# Patient Record
Sex: MALE | Race: ASIAN | NOT HISPANIC OR LATINO | URBAN - METROPOLITAN AREA
[De-identification: names, ages, dates, MRNs, and addresses within clinical notes are randomized per-mention and may not be internally consistent; named-entity substitution may affect disease eponyms.]

---

## 2017-06-16 ENCOUNTER — EMERGENCY (EMERGENCY)
Facility: HOSPITAL | Age: 37
LOS: 1 days | Discharge: HOME | End: 2017-06-16
Attending: EMERGENCY MEDICINE | Admitting: EMERGENCY MEDICINE
Payer: COMMERCIAL

## 2017-06-16 VITALS
RESPIRATION RATE: 18 BRPM | HEART RATE: 93 BPM | DIASTOLIC BLOOD PRESSURE: 106 MMHG | OXYGEN SATURATION: 100 % | SYSTOLIC BLOOD PRESSURE: 160 MMHG | TEMPERATURE: 99 F

## 2017-06-16 LAB
APPEARANCE UR: CLEAR — SIGNIFICANT CHANGE UP
BASOPHILS # BLD AUTO: 0.02 K/UL — SIGNIFICANT CHANGE UP (ref 0–0.2)
BASOPHILS NFR BLD AUTO: 0.2 % — SIGNIFICANT CHANGE UP (ref 0–2)
BILIRUB UR-MCNC: NEGATIVE — SIGNIFICANT CHANGE UP
BLOOD UR QL VISUAL: HIGH
COLOR SPEC: SIGNIFICANT CHANGE UP
EOSINOPHIL # BLD AUTO: 0.16 K/UL — SIGNIFICANT CHANGE UP (ref 0–0.5)
EOSINOPHIL NFR BLD AUTO: 1.9 % — SIGNIFICANT CHANGE UP (ref 0–6)
GLUCOSE UR-MCNC: >1000 — SIGNIFICANT CHANGE UP
HCT VFR BLD CALC: 47.9 % — SIGNIFICANT CHANGE UP (ref 39–50)
HGB BLD-MCNC: 15.5 G/DL — SIGNIFICANT CHANGE UP (ref 13–17)
IMM GRANULOCYTES NFR BLD AUTO: 0.9 % — SIGNIFICANT CHANGE UP (ref 0–1.5)
KETONES UR-MCNC: NEGATIVE — SIGNIFICANT CHANGE UP
LEUKOCYTE ESTERASE UR-ACNC: HIGH
LYMPHOCYTES # BLD AUTO: 2.79 K/UL — SIGNIFICANT CHANGE UP (ref 1–3.3)
LYMPHOCYTES # BLD AUTO: 32.3 % — SIGNIFICANT CHANGE UP (ref 13–44)
MCHC RBC-ENTMCNC: 31.9 PG — SIGNIFICANT CHANGE UP (ref 27–34)
MCHC RBC-ENTMCNC: 32.4 % — SIGNIFICANT CHANGE UP (ref 32–36)
MCV RBC AUTO: 98.6 FL — SIGNIFICANT CHANGE UP (ref 80–100)
MONOCYTES # BLD AUTO: 0.87 K/UL — SIGNIFICANT CHANGE UP (ref 0–0.9)
MONOCYTES NFR BLD AUTO: 10.1 % — SIGNIFICANT CHANGE UP (ref 2–14)
MUCOUS THREADS # UR AUTO: SIGNIFICANT CHANGE UP
NEUTROPHILS # BLD AUTO: 4.72 K/UL — SIGNIFICANT CHANGE UP (ref 1.8–7.4)
NEUTROPHILS NFR BLD AUTO: 54.6 % — SIGNIFICANT CHANGE UP (ref 43–77)
NITRITE UR-MCNC: NEGATIVE — SIGNIFICANT CHANGE UP
NON-SQ EPI CELLS # UR AUTO: <1 — SIGNIFICANT CHANGE UP
PH UR: 6.5 — SIGNIFICANT CHANGE UP (ref 4.6–8)
PLATELET # BLD AUTO: 211 K/UL — SIGNIFICANT CHANGE UP (ref 150–400)
PMV BLD: 9.4 FL — SIGNIFICANT CHANGE UP (ref 7–13)
PROT UR-MCNC: 10 — SIGNIFICANT CHANGE UP
RBC # BLD: 4.86 M/UL — SIGNIFICANT CHANGE UP (ref 4.2–5.8)
RBC # FLD: 12.1 % — SIGNIFICANT CHANGE UP (ref 10.3–14.5)
RBC CASTS # UR COMP ASSIST: >50 — HIGH (ref 0–?)
SP GR SPEC: 1.02 — SIGNIFICANT CHANGE UP (ref 1–1.03)
UROBILINOGEN FLD QL: NORMAL E.U. — SIGNIFICANT CHANGE UP (ref 0.1–0.2)
WBC # BLD: 8.64 K/UL — SIGNIFICANT CHANGE UP (ref 3.8–10.5)
WBC # FLD AUTO: 8.64 K/UL — SIGNIFICANT CHANGE UP (ref 3.8–10.5)
WBC CLUMPS #/AREA URNS HPF: PRESENT — HIGH (ref 0–?)
WBC UR QL: SIGNIFICANT CHANGE UP (ref 0–?)

## 2017-06-16 PROCEDURE — 74176 CT ABD & PELVIS W/O CONTRAST: CPT | Mod: 26

## 2017-06-16 PROCEDURE — 99285 EMERGENCY DEPT VISIT HI MDM: CPT

## 2017-06-16 RX ORDER — KETOROLAC TROMETHAMINE 30 MG/ML
15 SYRINGE (ML) INJECTION ONCE
Qty: 0 | Refills: 0 | Status: DISCONTINUED | OUTPATIENT
Start: 2017-06-16 | End: 2017-06-16

## 2017-06-16 RX ORDER — SODIUM CHLORIDE 9 MG/ML
1000 INJECTION INTRAMUSCULAR; INTRAVENOUS; SUBCUTANEOUS ONCE
Qty: 0 | Refills: 0 | Status: COMPLETED | OUTPATIENT
Start: 2017-06-16 | End: 2017-06-16

## 2017-06-16 RX ADMIN — Medication 15 MILLIGRAM(S): at 23:36

## 2017-06-16 RX ADMIN — SODIUM CHLORIDE 1000 MILLILITER(S): 9 INJECTION INTRAMUSCULAR; INTRAVENOUS; SUBCUTANEOUS at 23:36

## 2017-06-16 NOTE — ED ADULT TRIAGE NOTE - CHIEF COMPLAINT QUOTE
Pt. with c/o R flank pain for a few days; worst since yesterday. Pt. with hx of kidney stones.  Pt. was last diagnosed with kidney stones 1.5yrs ago.

## 2017-06-16 NOTE — ED PROVIDER NOTE - NS ED ROS FT
pt no acute distress. normal S1-S2 No resp distress. able to speak in full and clear sentences. no wheeze, rales or stridor. soft tender right side abdomen. no  rebound. no guarding. no sign of trauma. no CVAT   chaperone T. Swapna tender right testicle. no penile dc. no lesions. no inguinal hernia.

## 2017-06-16 NOTE — ED PROVIDER NOTE - PROGRESS NOTE DETAILS
pt informed of results of labs and Ct. Pain "ok" still has some pain, does not want pain med. pending testicular us. [facility] urology on call, Dr Harris, reviewed images no stent indicated now. if pt pain controlled pt may go home w po meds  (abx and pain med) . pt in US now. uncle states he "is ok, no pain". will endorse pt to Dr Larsen pending us and re eval for pain.

## 2017-06-16 NOTE — ED PROVIDER NOTE - OBJECTIVE STATEMENT
38yo M hx of kidney stones pw right flank pain for a day. Pain located on the right side radiate to the front and right testicle. no dysuria no freq or urgency no nausea/vomit. no fever or chills.  hx of kidney stone feels similar. no penile dc. not sexually active. no hx of STD. Dw w pt in private w chaperone.

## 2017-06-17 VITALS
OXYGEN SATURATION: 100 % | SYSTOLIC BLOOD PRESSURE: 139 MMHG | HEART RATE: 74 BPM | RESPIRATION RATE: 16 BRPM | TEMPERATURE: 99 F

## 2017-06-17 LAB
ALBUMIN SERPL ELPH-MCNC: 4.5 G/DL — SIGNIFICANT CHANGE UP (ref 3.3–5)
ALP SERPL-CCNC: 50 U/L — SIGNIFICANT CHANGE UP (ref 40–120)
ALT FLD-CCNC: 54 U/L — HIGH (ref 4–41)
AST SERPL-CCNC: 26 U/L — SIGNIFICANT CHANGE UP (ref 4–40)
BILIRUB SERPL-MCNC: 0.8 MG/DL — SIGNIFICANT CHANGE UP (ref 0.2–1.2)
BUN SERPL-MCNC: 14 MG/DL — SIGNIFICANT CHANGE UP (ref 7–23)
CALCIUM SERPL-MCNC: 9.6 MG/DL — SIGNIFICANT CHANGE UP (ref 8.4–10.5)
CHLORIDE SERPL-SCNC: 100 MMOL/L — SIGNIFICANT CHANGE UP (ref 98–107)
CO2 SERPL-SCNC: 26 MMOL/L — SIGNIFICANT CHANGE UP (ref 22–31)
CREAT SERPL-MCNC: 1.06 MG/DL — SIGNIFICANT CHANGE UP (ref 0.5–1.3)
GLUCOSE SERPL-MCNC: 274 MG/DL — HIGH (ref 70–99)
POTASSIUM SERPL-MCNC: 4.3 MMOL/L — SIGNIFICANT CHANGE UP (ref 3.5–5.3)
POTASSIUM SERPL-SCNC: 4.3 MMOL/L — SIGNIFICANT CHANGE UP (ref 3.5–5.3)
PROT SERPL-MCNC: 7.2 G/DL — SIGNIFICANT CHANGE UP (ref 6–8.3)
SODIUM SERPL-SCNC: 139 MMOL/L — SIGNIFICANT CHANGE UP (ref 135–145)

## 2017-06-17 PROCEDURE — 76870 US EXAM SCROTUM: CPT | Mod: 26

## 2017-06-17 RX ORDER — CIPROFLOXACIN LACTATE 400MG/40ML
500 VIAL (ML) INTRAVENOUS ONCE
Qty: 0 | Refills: 0 | Status: COMPLETED | OUTPATIENT
Start: 2017-06-17 | End: 2017-06-17

## 2017-06-17 RX ORDER — IBUPROFEN 200 MG
1 TABLET ORAL
Qty: 28 | Refills: 0 | OUTPATIENT
Start: 2017-06-17 | End: 2017-06-24

## 2017-06-17 RX ADMIN — Medication 500 MILLIGRAM(S): at 01:08

## 2017-06-18 LAB
BACTERIA UR CULT: SIGNIFICANT CHANGE UP
SPECIMEN SOURCE: SIGNIFICANT CHANGE UP

## 2017-06-19 PROBLEM — Z00.00 ENCOUNTER FOR PREVENTIVE HEALTH EXAMINATION: Status: ACTIVE | Noted: 2017-06-19

## 2017-06-19 LAB — SPECIMEN SOURCE: SIGNIFICANT CHANGE UP

## 2017-06-20 LAB
C TRACH RRNA SPEC QL NAA+PROBE: SIGNIFICANT CHANGE UP
N GONORRHOEA RRNA SPEC QL NAA+PROBE: SIGNIFICANT CHANGE UP

## 2017-06-21 ENCOUNTER — APPOINTMENT (OUTPATIENT)
Dept: UROLOGY | Facility: CLINIC | Age: 37
End: 2017-06-21

## 2017-06-21 VITALS
SYSTOLIC BLOOD PRESSURE: 142 MMHG | DIASTOLIC BLOOD PRESSURE: 99 MMHG | WEIGHT: 185 LBS | HEART RATE: 81 BPM | TEMPERATURE: 97.4 F | BODY MASS INDEX: 26.48 KG/M2 | HEIGHT: 70 IN | RESPIRATION RATE: 18 BRPM

## 2017-06-21 DIAGNOSIS — N20.0 CALCULUS OF KIDNEY: ICD-10-CM

## 2017-06-22 LAB
APPEARANCE: CLEAR
BACTERIA: NEGATIVE
BILIRUBIN URINE: NEGATIVE
BLOOD URINE: ABNORMAL
COLOR: YELLOW
GLUCOSE QUALITATIVE U: 1000 MG/DL
HYALINE CASTS: 0 /LPF
KETONES URINE: NEGATIVE
LEUKOCYTE ESTERASE URINE: NEGATIVE
MICROSCOPIC-UA: NORMAL
NITRITE URINE: NEGATIVE
PH URINE: 6
PROTEIN URINE: ABNORMAL MG/DL
RED BLOOD CELLS URINE: 42 /HPF
SPECIFIC GRAVITY URINE: 1.04
SQUAMOUS EPITHELIAL CELLS: 0 /HPF
UROBILINOGEN URINE: NORMAL MG/DL
WHITE BLOOD CELLS URINE: 5 /HPF

## 2017-06-23 LAB — BACTERIA UR CULT: NORMAL

## 2017-07-17 ENCOUNTER — MESSAGE (OUTPATIENT)
Age: 37
End: 2017-07-17

## 2017-07-17 ENCOUNTER — OUTPATIENT (OUTPATIENT)
Dept: OUTPATIENT SERVICES | Facility: HOSPITAL | Age: 37
LOS: 1 days | End: 2017-07-17
Payer: COMMERCIAL

## 2017-07-17 VITALS
WEIGHT: 171.96 LBS | DIASTOLIC BLOOD PRESSURE: 84 MMHG | HEART RATE: 70 BPM | HEIGHT: 70 IN | RESPIRATION RATE: 16 BRPM | TEMPERATURE: 98 F | OXYGEN SATURATION: 99 % | SYSTOLIC BLOOD PRESSURE: 118 MMHG

## 2017-07-17 DIAGNOSIS — N20.0 CALCULUS OF KIDNEY: ICD-10-CM

## 2017-07-17 DIAGNOSIS — Z01.818 ENCOUNTER FOR OTHER PREPROCEDURAL EXAMINATION: ICD-10-CM

## 2017-07-17 DIAGNOSIS — E11.9 TYPE 2 DIABETES MELLITUS WITHOUT COMPLICATIONS: ICD-10-CM

## 2017-07-17 LAB
ANION GAP SERPL CALC-SCNC: 15 MMOL/L — SIGNIFICANT CHANGE UP (ref 5–17)
BUN SERPL-MCNC: 16 MG/DL — SIGNIFICANT CHANGE UP (ref 7–23)
CALCIUM SERPL-MCNC: 9.7 MG/DL — SIGNIFICANT CHANGE UP (ref 8.4–10.5)
CHLORIDE SERPL-SCNC: 103 MMOL/L — SIGNIFICANT CHANGE UP (ref 96–108)
CO2 SERPL-SCNC: 23 MMOL/L — SIGNIFICANT CHANGE UP (ref 22–31)
CREAT SERPL-MCNC: 1.08 MG/DL — SIGNIFICANT CHANGE UP (ref 0.5–1.3)
GLUCOSE SERPL-MCNC: 185 MG/DL — HIGH (ref 70–99)
HBA1C BLD-MCNC: 9.9 % — HIGH (ref 4–5.6)
HCT VFR BLD CALC: 49.3 % — SIGNIFICANT CHANGE UP (ref 39–50)
HGB BLD-MCNC: 16.4 G/DL — SIGNIFICANT CHANGE UP (ref 13–17)
MCHC RBC-ENTMCNC: 32 PG — SIGNIFICANT CHANGE UP (ref 27–34)
MCHC RBC-ENTMCNC: 33.3 GM/DL — SIGNIFICANT CHANGE UP (ref 32–36)
MCV RBC AUTO: 96.1 FL — SIGNIFICANT CHANGE UP (ref 80–100)
PLATELET # BLD AUTO: 222 K/UL — SIGNIFICANT CHANGE UP (ref 150–400)
POTASSIUM SERPL-MCNC: 4.7 MMOL/L — SIGNIFICANT CHANGE UP (ref 3.5–5.3)
POTASSIUM SERPL-SCNC: 4.7 MMOL/L — SIGNIFICANT CHANGE UP (ref 3.5–5.3)
RBC # BLD: 5.13 M/UL — SIGNIFICANT CHANGE UP (ref 4.2–5.8)
RBC # FLD: 12 % — SIGNIFICANT CHANGE UP (ref 10.3–14.5)
SODIUM SERPL-SCNC: 141 MMOL/L — SIGNIFICANT CHANGE UP (ref 135–145)
WBC # BLD: 8.97 K/UL — SIGNIFICANT CHANGE UP (ref 3.8–10.5)
WBC # FLD AUTO: 8.97 K/UL — SIGNIFICANT CHANGE UP (ref 3.8–10.5)

## 2017-07-17 RX ORDER — CEFAZOLIN SODIUM 1 G
2000 VIAL (EA) INJECTION ONCE
Qty: 0 | Refills: 0 | Status: DISCONTINUED | OUTPATIENT
Start: 2017-07-27 | End: 2017-07-29

## 2017-07-17 NOTE — H&P PST ADULT - NSANTHOSAYNRD_GEN_A_CORE
No. DUANE screening performed.  STOP BANG Legend: 0-2 = LOW Risk; 3-4 = INTERMEDIATE Risk; 5-8 = HIGH Risk

## 2017-07-17 NOTE — H&P PST ADULT - HISTORY OF PRESENT ILLNESS
36 y/o Male C/O right flank & right groin pain. Pt reports he went to ER 6/2017. S/P Cat scan revealed kidney stone. Presents right percutaneous nephrostolithotomy.

## 2017-07-17 NOTE — H&P PST ADULT - PMH
Controlled type 2 diabetes mellitus without complication, unspecified long term insulin use status  Pt was just diagnosed 1 month ago with DM 2  Kidney stones  right  Varicocele present on ultrasound of scrotum Controlled type 2 diabetes mellitus without complication, unspecified long term insulin use status  Pt reports newly diagnosed 1 month ago with DM 2, started on insulin and states FS are less than 100 in AM  Kidney stones  right  Varicocele present on ultrasound of scrotum

## 2017-07-18 LAB
CULTURE RESULTS: SIGNIFICANT CHANGE UP
SPECIMEN SOURCE: SIGNIFICANT CHANGE UP

## 2017-07-27 ENCOUNTER — MESSAGE (OUTPATIENT)
Age: 37
End: 2017-07-27

## 2017-07-27 ENCOUNTER — TRANSCRIPTION ENCOUNTER (OUTPATIENT)
Age: 37
End: 2017-07-27

## 2017-07-27 ENCOUNTER — INPATIENT (INPATIENT)
Facility: HOSPITAL | Age: 37
LOS: 2 days | Discharge: HOME | DRG: 661 | End: 2017-07-30
Attending: UROLOGY | Admitting: UROLOGY
Payer: COMMERCIAL

## 2017-07-27 ENCOUNTER — APPOINTMENT (OUTPATIENT)
Dept: UROLOGY | Facility: HOSPITAL | Age: 37
End: 2017-07-27

## 2017-07-27 ENCOUNTER — RESULT REVIEW (OUTPATIENT)
Age: 37
End: 2017-07-27

## 2017-07-27 VITALS
OXYGEN SATURATION: 99 % | SYSTOLIC BLOOD PRESSURE: 130 MMHG | RESPIRATION RATE: 16 BRPM | TEMPERATURE: 98 F | HEART RATE: 78 BPM | DIASTOLIC BLOOD PRESSURE: 84 MMHG

## 2017-07-27 DIAGNOSIS — N20.0 CALCULUS OF KIDNEY: ICD-10-CM

## 2017-07-27 LAB
ANION GAP SERPL CALC-SCNC: 14 MMOL/L — SIGNIFICANT CHANGE UP (ref 5–17)
BASOPHILS # BLD AUTO: 0 K/UL — SIGNIFICANT CHANGE UP (ref 0–0.2)
BASOPHILS NFR BLD AUTO: 0.1 % — SIGNIFICANT CHANGE UP (ref 0–2)
BLD GP AB SCN SERPL QL: NEGATIVE — SIGNIFICANT CHANGE UP
BUN SERPL-MCNC: 12 MG/DL — SIGNIFICANT CHANGE UP (ref 7–23)
CALCIUM SERPL-MCNC: 8.6 MG/DL — SIGNIFICANT CHANGE UP (ref 8.4–10.5)
CHLORIDE SERPL-SCNC: 101 MMOL/L — SIGNIFICANT CHANGE UP (ref 96–108)
CO2 SERPL-SCNC: 24 MMOL/L — SIGNIFICANT CHANGE UP (ref 22–31)
CREAT SERPL-MCNC: 1.1 MG/DL — SIGNIFICANT CHANGE UP (ref 0.5–1.3)
EOSINOPHIL # BLD AUTO: 0 K/UL — SIGNIFICANT CHANGE UP (ref 0–0.5)
EOSINOPHIL NFR BLD AUTO: 0.2 % — SIGNIFICANT CHANGE UP (ref 0–6)
GLUCOSE SERPL-MCNC: 136 MG/DL — HIGH (ref 70–99)
HCT VFR BLD CALC: 46.7 % — SIGNIFICANT CHANGE UP (ref 39–50)
HGB BLD-MCNC: 15.8 G/DL — SIGNIFICANT CHANGE UP (ref 13–17)
LYMPHOCYTES # BLD AUTO: 1.7 K/UL — SIGNIFICANT CHANGE UP (ref 1–3.3)
LYMPHOCYTES # BLD AUTO: 10.1 % — LOW (ref 13–44)
MCHC RBC-ENTMCNC: 33.4 PG — SIGNIFICANT CHANGE UP (ref 27–34)
MCHC RBC-ENTMCNC: 33.8 GM/DL — SIGNIFICANT CHANGE UP (ref 32–36)
MCV RBC AUTO: 99 FL — SIGNIFICANT CHANGE UP (ref 80–100)
MONOCYTES # BLD AUTO: 0.2 K/UL — SIGNIFICANT CHANGE UP (ref 0–0.9)
MONOCYTES NFR BLD AUTO: 1.4 % — LOW (ref 2–14)
NEUTROPHILS # BLD AUTO: 14.8 K/UL — HIGH (ref 1.8–7.4)
NEUTROPHILS NFR BLD AUTO: 88.2 % — HIGH (ref 43–77)
PLATELET # BLD AUTO: 195 K/UL — SIGNIFICANT CHANGE UP (ref 150–400)
POTASSIUM SERPL-MCNC: 4.2 MMOL/L — SIGNIFICANT CHANGE UP (ref 3.5–5.3)
POTASSIUM SERPL-SCNC: 4.2 MMOL/L — SIGNIFICANT CHANGE UP (ref 3.5–5.3)
RBC # BLD: 4.71 M/UL — SIGNIFICANT CHANGE UP (ref 4.2–5.8)
RBC # FLD: 11.1 % — SIGNIFICANT CHANGE UP (ref 10.3–14.5)
RH IG SCN BLD-IMP: POSITIVE — SIGNIFICANT CHANGE UP
RH IG SCN BLD-IMP: POSITIVE — SIGNIFICANT CHANGE UP
SODIUM SERPL-SCNC: 139 MMOL/L — SIGNIFICANT CHANGE UP (ref 135–145)
WBC # BLD: 16.8 K/UL — HIGH (ref 3.8–10.5)
WBC # FLD AUTO: 16.8 K/UL — HIGH (ref 3.8–10.5)

## 2017-07-27 PROCEDURE — 76000 FLUOROSCOPY <1 HR PHYS/QHP: CPT

## 2017-07-27 PROCEDURE — 87086 URINE CULTURE/COLONY COUNT: CPT

## 2017-07-27 PROCEDURE — 80048 BASIC METABOLIC PNL TOTAL CA: CPT

## 2017-07-27 PROCEDURE — 83036 HEMOGLOBIN GLYCOSYLATED A1C: CPT

## 2017-07-27 PROCEDURE — G0463: CPT

## 2017-07-27 PROCEDURE — 88300 SURGICAL PATH GROSS: CPT | Mod: 26

## 2017-07-27 PROCEDURE — 85027 COMPLETE CBC AUTOMATED: CPT

## 2017-07-27 RX ORDER — HYDROMORPHONE HYDROCHLORIDE 2 MG/ML
1 INJECTION INTRAMUSCULAR; INTRAVENOUS; SUBCUTANEOUS EVERY 4 HOURS
Qty: 0 | Refills: 0 | Status: DISCONTINUED | OUTPATIENT
Start: 2017-07-27 | End: 2017-07-30

## 2017-07-27 RX ORDER — ONDANSETRON 8 MG/1
4 TABLET, FILM COATED ORAL EVERY 6 HOURS
Qty: 0 | Refills: 0 | Status: DISCONTINUED | OUTPATIENT
Start: 2017-07-27 | End: 2017-07-30

## 2017-07-27 RX ORDER — DEXTROSE 50 % IN WATER 50 %
12.5 SYRINGE (ML) INTRAVENOUS ONCE
Qty: 0 | Refills: 0 | Status: DISCONTINUED | OUTPATIENT
Start: 2017-07-27 | End: 2017-07-30

## 2017-07-27 RX ORDER — HYDROMORPHONE HYDROCHLORIDE 2 MG/ML
0.5 INJECTION INTRAMUSCULAR; INTRAVENOUS; SUBCUTANEOUS EVERY 4 HOURS
Qty: 0 | Refills: 0 | Status: DISCONTINUED | OUTPATIENT
Start: 2017-07-27 | End: 2017-07-30

## 2017-07-27 RX ORDER — DOCUSATE SODIUM 100 MG
100 CAPSULE ORAL
Qty: 0 | Refills: 0 | Status: DISCONTINUED | OUTPATIENT
Start: 2017-07-27 | End: 2017-07-30

## 2017-07-27 RX ORDER — SODIUM CHLORIDE 9 MG/ML
3 INJECTION INTRAMUSCULAR; INTRAVENOUS; SUBCUTANEOUS EVERY 8 HOURS
Qty: 0 | Refills: 0 | Status: DISCONTINUED | OUTPATIENT
Start: 2017-07-27 | End: 2017-07-27

## 2017-07-27 RX ORDER — DEXTROSE 50 % IN WATER 50 %
25 SYRINGE (ML) INTRAVENOUS ONCE
Qty: 0 | Refills: 0 | Status: DISCONTINUED | OUTPATIENT
Start: 2017-07-27 | End: 2017-07-30

## 2017-07-27 RX ORDER — GLUCAGON INJECTION, SOLUTION 0.5 MG/.1ML
1 INJECTION, SOLUTION SUBCUTANEOUS ONCE
Qty: 0 | Refills: 0 | Status: DISCONTINUED | OUTPATIENT
Start: 2017-07-27 | End: 2017-07-30

## 2017-07-27 RX ORDER — HEPARIN SODIUM 5000 [USP'U]/ML
5000 INJECTION INTRAVENOUS; SUBCUTANEOUS EVERY 12 HOURS
Qty: 0 | Refills: 0 | Status: DISCONTINUED | OUTPATIENT
Start: 2017-07-27 | End: 2017-07-30

## 2017-07-27 RX ORDER — SENNA PLUS 8.6 MG/1
2 TABLET ORAL AT BEDTIME
Qty: 0 | Refills: 0 | Status: DISCONTINUED | OUTPATIENT
Start: 2017-07-27 | End: 2017-07-30

## 2017-07-27 RX ORDER — INSULIN LISPRO 100/ML
VIAL (ML) SUBCUTANEOUS AT BEDTIME
Qty: 0 | Refills: 0 | Status: DISCONTINUED | OUTPATIENT
Start: 2017-07-27 | End: 2017-07-30

## 2017-07-27 RX ORDER — ONDANSETRON 8 MG/1
4 TABLET, FILM COATED ORAL ONCE
Qty: 0 | Refills: 0 | Status: DISCONTINUED | OUTPATIENT
Start: 2017-07-27 | End: 2017-07-28

## 2017-07-27 RX ORDER — SODIUM CHLORIDE 9 MG/ML
1000 INJECTION, SOLUTION INTRAVENOUS
Qty: 0 | Refills: 0 | Status: DISCONTINUED | OUTPATIENT
Start: 2017-07-27 | End: 2017-07-30

## 2017-07-27 RX ORDER — PHENAZOPYRIDINE HCL 100 MG
200 TABLET ORAL EVERY 8 HOURS
Qty: 0 | Refills: 0 | Status: COMPLETED | OUTPATIENT
Start: 2017-07-27 | End: 2017-07-28

## 2017-07-27 RX ORDER — DEXTROSE 50 % IN WATER 50 %
1 SYRINGE (ML) INTRAVENOUS ONCE
Qty: 0 | Refills: 0 | Status: DISCONTINUED | OUTPATIENT
Start: 2017-07-27 | End: 2017-07-30

## 2017-07-27 RX ORDER — CEFAZOLIN SODIUM 1 G
1000 VIAL (EA) INJECTION EVERY 8 HOURS
Qty: 0 | Refills: 0 | Status: DISCONTINUED | OUTPATIENT
Start: 2017-07-27 | End: 2017-07-29

## 2017-07-27 RX ORDER — INSULIN LISPRO 100/ML
VIAL (ML) SUBCUTANEOUS
Qty: 0 | Refills: 0 | Status: DISCONTINUED | OUTPATIENT
Start: 2017-07-27 | End: 2017-07-30

## 2017-07-27 RX ORDER — OXYCODONE AND ACETAMINOPHEN 5; 325 MG/1; MG/1
1 TABLET ORAL EVERY 4 HOURS
Qty: 0 | Refills: 0 | Status: DISCONTINUED | OUTPATIENT
Start: 2017-07-27 | End: 2017-07-30

## 2017-07-27 RX ORDER — HYDROMORPHONE HYDROCHLORIDE 2 MG/ML
0.5 INJECTION INTRAMUSCULAR; INTRAVENOUS; SUBCUTANEOUS
Qty: 0 | Refills: 0 | Status: DISCONTINUED | OUTPATIENT
Start: 2017-07-27 | End: 2017-07-28

## 2017-07-27 RX ORDER — SODIUM CHLORIDE 9 MG/ML
1000 INJECTION INTRAMUSCULAR; INTRAVENOUS; SUBCUTANEOUS
Qty: 0 | Refills: 0 | Status: DISCONTINUED | OUTPATIENT
Start: 2017-07-27 | End: 2017-07-30

## 2017-07-27 RX ORDER — OXYCODONE AND ACETAMINOPHEN 5; 325 MG/1; MG/1
2 TABLET ORAL EVERY 4 HOURS
Qty: 0 | Refills: 0 | Status: DISCONTINUED | OUTPATIENT
Start: 2017-07-27 | End: 2017-07-30

## 2017-07-27 RX ORDER — HYDROMORPHONE HYDROCHLORIDE 2 MG/ML
1 INJECTION INTRAMUSCULAR; INTRAVENOUS; SUBCUTANEOUS
Qty: 0 | Refills: 0 | Status: DISCONTINUED | OUTPATIENT
Start: 2017-07-27 | End: 2017-07-27

## 2017-07-27 RX ADMIN — Medication 100 MILLIGRAM(S): at 22:16

## 2017-07-27 RX ADMIN — HYDROMORPHONE HYDROCHLORIDE 1 MILLIGRAM(S): 2 INJECTION INTRAMUSCULAR; INTRAVENOUS; SUBCUTANEOUS at 19:00

## 2017-07-27 RX ADMIN — HYDROMORPHONE HYDROCHLORIDE 1 MILLIGRAM(S): 2 INJECTION INTRAMUSCULAR; INTRAVENOUS; SUBCUTANEOUS at 18:40

## 2017-07-27 RX ADMIN — HYDROMORPHONE HYDROCHLORIDE 1 MILLIGRAM(S): 2 INJECTION INTRAMUSCULAR; INTRAVENOUS; SUBCUTANEOUS at 19:32

## 2017-07-27 RX ADMIN — HYDROMORPHONE HYDROCHLORIDE 0.5 MILLIGRAM(S): 2 INJECTION INTRAMUSCULAR; INTRAVENOUS; SUBCUTANEOUS at 22:28

## 2017-07-27 RX ADMIN — HYDROMORPHONE HYDROCHLORIDE 1 MILLIGRAM(S): 2 INJECTION INTRAMUSCULAR; INTRAVENOUS; SUBCUTANEOUS at 19:15

## 2017-07-27 RX ADMIN — SODIUM CHLORIDE 125 MILLILITER(S): 9 INJECTION INTRAMUSCULAR; INTRAVENOUS; SUBCUTANEOUS at 19:02

## 2017-07-27 RX ADMIN — HYDROMORPHONE HYDROCHLORIDE 0.5 MILLIGRAM(S): 2 INJECTION INTRAMUSCULAR; INTRAVENOUS; SUBCUTANEOUS at 22:39

## 2017-07-27 RX ADMIN — HEPARIN SODIUM 5000 UNIT(S): 5000 INJECTION INTRAVENOUS; SUBCUTANEOUS at 23:37

## 2017-07-27 NOTE — PROGRESS NOTE ADULT - SUBJECTIVE AND OBJECTIVE BOX
Post op Check    Pt seen and examined without complaints. Pain is controlled. Denies SOB/CP/N/V.   Tolerating po    Vital Signs Last 24 Hrs  T(C): 36.5 (27 Jul 2017 20:00), Max: 36.9 (27 Jul 2017 11:39)  T(F): 97.7 (27 Jul 2017 20:00), Max: 98.4 (27 Jul 2017 11:39)  HR: 76 (27 Jul 2017 21:00) (68 - 99)  BP: 110/68 (27 Jul 2017 21:00) (110/68 - 148/90)  BP(mean): 84 (27 Jul 2017 21:00) (82 - 112)  RR: 15 (27 Jul 2017 21:00) (14 - 16)  SpO2: 95% (27 Jul 2017 21:00) (95% - 100%)    I&O's Summary    27 Jul 2017 07:01  -  27 Jul 2017 22:33  --------------------------------------------------------  IN: 500 mL / OUT: 550 mL / NET: -50 mL        Physical Exam  Gen: NAD  Abd: Soft, NT, ND  Back: R PCN in place, no drainage  : 16F south in place, urine hematuric                          15.8   16.8  )-----------( 195      ( 27 Jul 2017 18:57 )             46.7       07-27    139  |  101  |  12  ----------------------------<  136<H>  4.2   |  24  |  1.10    Ca    8.6      27 Jul 2017 18:57        A/P: 37y Male s/p Right Percutaneous Nephrolithotomy    -DVT prophylaxis/OOB  -Incentive spirometry  -Strict I&O's  -Analgesia and antiemetics as needed  -Diet  -AM labs  -CT tomorrow  -likely TOV

## 2017-07-28 ENCOUNTER — TRANSCRIPTION ENCOUNTER (OUTPATIENT)
Age: 37
End: 2017-07-28

## 2017-07-28 LAB
ANION GAP SERPL CALC-SCNC: 14 MMOL/L — SIGNIFICANT CHANGE UP (ref 5–17)
BASOPHILS # BLD AUTO: 0 K/UL — SIGNIFICANT CHANGE UP (ref 0–0.2)
BASOPHILS NFR BLD AUTO: 0.2 % — SIGNIFICANT CHANGE UP (ref 0–2)
BUN SERPL-MCNC: 12 MG/DL — SIGNIFICANT CHANGE UP (ref 7–23)
CALCIUM SERPL-MCNC: 9 MG/DL — SIGNIFICANT CHANGE UP (ref 8.4–10.5)
CHLORIDE SERPL-SCNC: 99 MMOL/L — SIGNIFICANT CHANGE UP (ref 96–108)
CO2 SERPL-SCNC: 26 MMOL/L — SIGNIFICANT CHANGE UP (ref 22–31)
CREAT SERPL-MCNC: 1.03 MG/DL — SIGNIFICANT CHANGE UP (ref 0.5–1.3)
EOSINOPHIL # BLD AUTO: 0 K/UL — SIGNIFICANT CHANGE UP (ref 0–0.5)
EOSINOPHIL NFR BLD AUTO: 0.1 % — SIGNIFICANT CHANGE UP (ref 0–6)
GLUCOSE SERPL-MCNC: 172 MG/DL — HIGH (ref 70–99)
HCT VFR BLD CALC: 49.1 % — SIGNIFICANT CHANGE UP (ref 39–50)
HGB BLD-MCNC: 15.1 G/DL — SIGNIFICANT CHANGE UP (ref 13–17)
LYMPHOCYTES # BLD AUTO: 1.4 K/UL — SIGNIFICANT CHANGE UP (ref 1–3.3)
LYMPHOCYTES # BLD AUTO: 11.7 % — LOW (ref 13–44)
MCHC RBC-ENTMCNC: 30.8 GM/DL — LOW (ref 32–36)
MCHC RBC-ENTMCNC: 30.9 PG — SIGNIFICANT CHANGE UP (ref 27–34)
MCV RBC AUTO: 100 FL — SIGNIFICANT CHANGE UP (ref 80–100)
MONOCYTES # BLD AUTO: 0.7 K/UL — SIGNIFICANT CHANGE UP (ref 0–0.9)
MONOCYTES NFR BLD AUTO: 6 % — SIGNIFICANT CHANGE UP (ref 2–14)
NEUTROPHILS # BLD AUTO: 9.9 K/UL — HIGH (ref 1.8–7.4)
NEUTROPHILS NFR BLD AUTO: 82 % — HIGH (ref 43–77)
PLATELET # BLD AUTO: 204 K/UL — SIGNIFICANT CHANGE UP (ref 150–400)
POTASSIUM SERPL-MCNC: 4.3 MMOL/L — SIGNIFICANT CHANGE UP (ref 3.5–5.3)
POTASSIUM SERPL-SCNC: 4.3 MMOL/L — SIGNIFICANT CHANGE UP (ref 3.5–5.3)
RBC # BLD: 4.9 M/UL — SIGNIFICANT CHANGE UP (ref 4.2–5.8)
RBC # FLD: 11 % — SIGNIFICANT CHANGE UP (ref 10.3–14.5)
SODIUM SERPL-SCNC: 139 MMOL/L — SIGNIFICANT CHANGE UP (ref 135–145)
WBC # BLD: 12.1 K/UL — HIGH (ref 3.8–10.5)
WBC # FLD AUTO: 12.1 K/UL — HIGH (ref 3.8–10.5)

## 2017-07-28 PROCEDURE — 74176 CT ABD & PELVIS W/O CONTRAST: CPT | Mod: 26

## 2017-07-28 RX ORDER — KETOROLAC TROMETHAMINE 30 MG/ML
30 SYRINGE (ML) INJECTION EVERY 8 HOURS
Qty: 0 | Refills: 0 | Status: DISCONTINUED | OUTPATIENT
Start: 2017-07-28 | End: 2017-07-29

## 2017-07-28 RX ADMIN — Medication 100 MILLIGRAM(S): at 04:58

## 2017-07-28 RX ADMIN — HYDROMORPHONE HYDROCHLORIDE 1 MILLIGRAM(S): 2 INJECTION INTRAMUSCULAR; INTRAVENOUS; SUBCUTANEOUS at 00:35

## 2017-07-28 RX ADMIN — HYDROMORPHONE HYDROCHLORIDE 1 MILLIGRAM(S): 2 INJECTION INTRAMUSCULAR; INTRAVENOUS; SUBCUTANEOUS at 00:21

## 2017-07-28 RX ADMIN — Medication 30 MILLIGRAM(S): at 17:42

## 2017-07-28 RX ADMIN — OXYCODONE AND ACETAMINOPHEN 2 TABLET(S): 5; 325 TABLET ORAL at 18:38

## 2017-07-28 RX ADMIN — OXYCODONE AND ACETAMINOPHEN 2 TABLET(S): 5; 325 TABLET ORAL at 10:24

## 2017-07-28 RX ADMIN — Medication 200 MILLIGRAM(S): at 04:59

## 2017-07-28 RX ADMIN — SODIUM CHLORIDE 125 MILLILITER(S): 9 INJECTION INTRAMUSCULAR; INTRAVENOUS; SUBCUTANEOUS at 13:59

## 2017-07-28 RX ADMIN — HYDROMORPHONE HYDROCHLORIDE 1 MILLIGRAM(S): 2 INJECTION INTRAMUSCULAR; INTRAVENOUS; SUBCUTANEOUS at 04:58

## 2017-07-28 RX ADMIN — Medication 100 MILLIGRAM(S): at 23:08

## 2017-07-28 RX ADMIN — Medication 30 MILLIGRAM(S): at 17:27

## 2017-07-28 RX ADMIN — Medication 1: at 11:51

## 2017-07-28 RX ADMIN — HEPARIN SODIUM 5000 UNIT(S): 5000 INJECTION INTRAVENOUS; SUBCUTANEOUS at 23:08

## 2017-07-28 RX ADMIN — Medication 30 MILLIGRAM(S): at 10:07

## 2017-07-28 RX ADMIN — Medication 200 MILLIGRAM(S): at 14:00

## 2017-07-28 RX ADMIN — HYDROMORPHONE HYDROCHLORIDE 0.5 MILLIGRAM(S): 2 INJECTION INTRAMUSCULAR; INTRAVENOUS; SUBCUTANEOUS at 08:14

## 2017-07-28 RX ADMIN — HEPARIN SODIUM 5000 UNIT(S): 5000 INJECTION INTRAVENOUS; SUBCUTANEOUS at 11:51

## 2017-07-28 RX ADMIN — OXYCODONE AND ACETAMINOPHEN 2 TABLET(S): 5; 325 TABLET ORAL at 09:54

## 2017-07-28 RX ADMIN — Medication 100 MILLIGRAM(S): at 17:27

## 2017-07-28 RX ADMIN — OXYCODONE AND ACETAMINOPHEN 2 TABLET(S): 5; 325 TABLET ORAL at 14:32

## 2017-07-28 RX ADMIN — OXYCODONE AND ACETAMINOPHEN 2 TABLET(S): 5; 325 TABLET ORAL at 19:08

## 2017-07-28 RX ADMIN — Medication 100 MILLIGRAM(S): at 13:59

## 2017-07-28 RX ADMIN — HYDROMORPHONE HYDROCHLORIDE 0.5 MILLIGRAM(S): 2 INJECTION INTRAMUSCULAR; INTRAVENOUS; SUBCUTANEOUS at 07:59

## 2017-07-28 RX ADMIN — Medication 30 MILLIGRAM(S): at 09:52

## 2017-07-28 RX ADMIN — HYDROMORPHONE HYDROCHLORIDE 1 MILLIGRAM(S): 2 INJECTION INTRAMUSCULAR; INTRAVENOUS; SUBCUTANEOUS at 05:12

## 2017-07-28 RX ADMIN — OXYCODONE AND ACETAMINOPHEN 2 TABLET(S): 5; 325 TABLET ORAL at 14:02

## 2017-07-28 NOTE — DISCHARGE NOTE ADULT - PATIENT PORTAL LINK FT
“You can access the [employer] Patient Portal, offered by [facility], by registering with the following website: [email or web address]”

## 2017-07-28 NOTE — DISCHARGE NOTE ADULT - CARE PLAN
Principal Discharge DX:	Kidney stones  Goal:	you had a right percutaneous nephrolithotomy  Instructions for follow-up, activity and diet:	Call the office if you experience fever, chills, uncontrolled pain, the inability to tolerate liquids, or the urine does not flow   to promote wound healing do not take a bath, continue to walk frequently, return to daily living activities slowly, no heavy lifting greater than 10lbs for 4-6 weeks, follow up Dr Scales in one to two weeks  Secondary Diagnosis:	Controlled type 2 diabetes mellitus without complication, unspecified long term insulin use status  Goal:	maintain adequate blood sugars  Instructions for follow-up, activity and diet:	continue home medications

## 2017-07-28 NOTE — DISCHARGE NOTE ADULT - HOSPITAL COURSE
36 yo m with a pmh of nephrolithiasis, dm2 who arrived at [facility] on 7/27/17 for a right perc nephrolithotomy. post op he did well. his vitals were stable, he tolerated diet, his pain was controlled, he ambulated  and his labs were stable. his ct showed no stone burden and his nt was removed on pod 2. he underwent a successful tov.

## 2017-07-28 NOTE — PROGRESS NOTE ADULT - ASSESSMENT
37M POD 1 s/p R PCNL, doing well.  --CBC, BMP  --TOV  --CT non con to assess residual stone  -- Out of bed, pain control, incentive spirometry, DVT prophylaxis   -- continue antibiotics until nephrostomy tube removed.

## 2017-07-28 NOTE — DISCHARGE NOTE ADULT - INSTRUCTIONS
regular diet call pmd if you have fever greater than 100 degrees, if you have pain unrelieved by medication ,  if your incision site becomes red, swollen, warm to touch,  if you see any oozing from site, or if you see fresh red blood.  call if you have difficulty urinating or if you have nausea or vomiting

## 2017-07-28 NOTE — DISCHARGE NOTE ADULT - PLAN OF CARE
you had a right percutaneous nephrolithotomy Call the office if you experience fever, chills, uncontrolled pain, the inability to tolerate liquids, or the urine does not flow   to promote wound healing do not take a bath, continue to walk frequently, return to daily living activities slowly, no heavy lifting greater than 10lbs for 4-6 weeks, follow up Dr Scales in one to two weeks maintain adequate blood sugars continue home medications

## 2017-07-28 NOTE — DISCHARGE NOTE ADULT - CARE PROVIDER_API CALL
Steffi Scales), Urology  [address] [place] [id number]  Phone: [phone]  Fax: [phone]

## 2017-07-28 NOTE — PROGRESS NOTE ADULT - SUBJECTIVE AND OBJECTIVE BOX
Subjective    Patient seen and examined. No overnight events. Complains of flank pain, helped with pain medicine, tolerating diet, no fevers, no n/v.     Objective    Vital signs  T(F): , Max: 98.4 (07-27-17 @ 11:39)  HR: 64 (07-28-17 @ 04:54)  BP: 111/72 (07-28-17 @ 04:54)  SpO2: 100% (07-28-17 @ 04:54)  Wt(kg): --    Output     07-27 @ 07:01  -  07-28 @ 06:03  --------------------------------------------------------  IN: 2040 mL / OUT: 1770 mL / NET: 270 mL        General: NAD  Abdomen: soft/non-tender/non-distended  : south light pink, removed. R NT in place, no hematoma, unclamped.     Labs      07-27 @ 18:57    WBC 16.8  / Hct 46.7  / SCr 1.10

## 2017-07-28 NOTE — DISCHARGE NOTE ADULT - MEDICATION SUMMARY - MEDICATIONS TO TAKE
I will START or STAY ON the medications listed below when I get home from the hospital:    glargine Pen subcutanous 12 Units HS  --   Instructed to decrease by 20 percent night before surgery  -- Indication: For home medication     Percocet 5/325 325 mg-5 mg oral tablet  -- 1 tab(s) by mouth every 4 hours MDD:6  -- Caution federal law prohibits the transfer of this drug to any person other  than the person for whom it was prescribed.  May cause drowsiness.  Alcohol may intensify this effect.  Use care when operating dangerous machinery.  This prescription cannot be refilled.  This product contains acetaminophen.  Do not use  with any other product containing acetaminophen to prevent possible liver damage.  Using more of this medication than prescribed may cause serious breathing problems.    -- Indication: For pain medication    Janumet 50 mg-500 mg oral tablet  -- 1 tab(s) by mouth once a day  -- Indication: For Home medication     senna oral tablet  -- 2 tab(s) by mouth once a day (at bedtime), As needed, Constipation  -- Indication: For stool softener    docusate sodium 100 mg oral capsule  -- 1 cap(s) by mouth 2 times a day  -- Indication: For stool softener

## 2017-07-29 RX ORDER — OXYCODONE AND ACETAMINOPHEN 5; 325 MG/1; MG/1
1 TABLET ORAL
Qty: 30 | Refills: 0
Start: 2017-07-29

## 2017-07-29 RX ORDER — SENNA PLUS 8.6 MG/1
2 TABLET ORAL
Qty: 0 | Refills: 0 | DISCHARGE
Start: 2017-07-29

## 2017-07-29 RX ORDER — DOCUSATE SODIUM 100 MG
1 CAPSULE ORAL
Qty: 0 | Refills: 0 | DISCHARGE
Start: 2017-07-29

## 2017-07-29 RX ADMIN — Medication 100 MILLIGRAM(S): at 22:24

## 2017-07-29 RX ADMIN — Medication 100 MILLIGRAM(S): at 14:49

## 2017-07-29 RX ADMIN — Medication 30 MILLIGRAM(S): at 22:54

## 2017-07-29 RX ADMIN — Medication 30 MILLIGRAM(S): at 22:24

## 2017-07-29 RX ADMIN — Medication 100 MILLIGRAM(S): at 05:16

## 2017-07-29 RX ADMIN — HEPARIN SODIUM 5000 UNIT(S): 5000 INJECTION INTRAVENOUS; SUBCUTANEOUS at 22:24

## 2017-07-29 RX ADMIN — Medication 100 MILLIGRAM(S): at 05:18

## 2017-07-29 RX ADMIN — Medication 1: at 12:09

## 2017-07-29 RX ADMIN — Medication 30 MILLIGRAM(S): at 12:09

## 2017-07-29 RX ADMIN — OXYCODONE AND ACETAMINOPHEN 2 TABLET(S): 5; 325 TABLET ORAL at 05:17

## 2017-07-29 RX ADMIN — OXYCODONE AND ACETAMINOPHEN 2 TABLET(S): 5; 325 TABLET ORAL at 16:34

## 2017-07-29 RX ADMIN — OXYCODONE AND ACETAMINOPHEN 2 TABLET(S): 5; 325 TABLET ORAL at 17:10

## 2017-07-29 RX ADMIN — OXYCODONE AND ACETAMINOPHEN 2 TABLET(S): 5; 325 TABLET ORAL at 05:47

## 2017-07-29 NOTE — PROGRESS NOTE ADULT - SUBJECTIVE AND OBJECTIVE BOX
Subjective    Objective    Vital signs  T(F): , Max: 99.2 (07-29-17 @ 05:48)  HR: 57 (07-29-17 @ 05:48)  BP: 130/86 (07-29-17 @ 05:48)  SpO2: 98% (07-29-17 @ 05:48)  Wt(kg): --    Output     07-28 @ 07:01  -  07-29 @ 07:00  --------------------------------------------------------  IN: 3490 mL / OUT: 2900 mL / NET: 590 mL        Gen awake alert nad axox3  Abd soft ntnd   Back tube in place       Labs      07-28 @ 06:37    WBC 12.1  / Hct 49.1  / SCr 1.03     07-27 @ 18:57    WBC 16.8  / Hct 46.7  / SCr 1.10       36 yo m s/p right pcnl  ct with no stone burden  dc nt tube today  dc home   no further abx needed Subjective pain improving     Objective    Vital signs  T(F): , Max: 99.2 (07-29-17 @ 05:48)  HR: 57 (07-29-17 @ 05:48)  BP: 130/86 (07-29-17 @ 05:48)  SpO2: 98% (07-29-17 @ 05:48)  Wt(kg): --    Output     07-28 @ 07:01  -  07-29 @ 07:00  --------------------------------------------------------  IN: 3490 mL / OUT: 2900 mL / NET: 590 mL        Gen awake alert nad axox3  Abd soft ntnd   Back tube in place urine pink tinged, no hematoma/ ecchymosis      Labs      07-28 @ 06:37    WBC 12.1  / Hct 49.1  / SCr 1.03     07-27 @ 18:57    WBC 16.8  / Hct 46.7  / SCr 1.10       36 yo m s/p right pcnl  ct with no stone burden  dc nt tube today  dc home   no further abx needed

## 2017-07-30 VITALS
DIASTOLIC BLOOD PRESSURE: 93 MMHG | TEMPERATURE: 98 F | SYSTOLIC BLOOD PRESSURE: 138 MMHG | HEART RATE: 63 BPM | RESPIRATION RATE: 18 BRPM | OXYGEN SATURATION: 99 %

## 2017-07-30 DIAGNOSIS — N20.0 CALCULUS OF KIDNEY: ICD-10-CM

## 2017-07-30 LAB
CULTURE RESULTS: SIGNIFICANT CHANGE UP
CULTURE RESULTS: SIGNIFICANT CHANGE UP
SPECIMEN SOURCE: SIGNIFICANT CHANGE UP
SPECIMEN SOURCE: SIGNIFICANT CHANGE UP

## 2017-07-30 PROCEDURE — C1769: CPT

## 2017-07-30 PROCEDURE — C1726: CPT

## 2017-07-30 PROCEDURE — 82365 CALCULUS SPECTROSCOPY: CPT

## 2017-07-30 PROCEDURE — 86901 BLOOD TYPING SEROLOGIC RH(D): CPT

## 2017-07-30 PROCEDURE — 86850 RBC ANTIBODY SCREEN: CPT

## 2017-07-30 PROCEDURE — C1758: CPT

## 2017-07-30 PROCEDURE — 87070 CULTURE OTHR SPECIMN AEROBIC: CPT

## 2017-07-30 PROCEDURE — 74176 CT ABD & PELVIS W/O CONTRAST: CPT

## 2017-07-30 PROCEDURE — 87086 URINE CULTURE/COLONY COUNT: CPT

## 2017-07-30 PROCEDURE — 88300 SURGICAL PATH GROSS: CPT

## 2017-07-30 PROCEDURE — 80048 BASIC METABOLIC PNL TOTAL CA: CPT

## 2017-07-30 PROCEDURE — C1889: CPT

## 2017-07-30 PROCEDURE — 86900 BLOOD TYPING SEROLOGIC ABO: CPT

## 2017-07-30 PROCEDURE — 85027 COMPLETE CBC AUTOMATED: CPT

## 2017-07-30 PROCEDURE — C1729: CPT

## 2017-07-30 RX ADMIN — Medication 100 MILLIGRAM(S): at 05:54

## 2017-07-30 RX ADMIN — OXYCODONE AND ACETAMINOPHEN 2 TABLET(S): 5; 325 TABLET ORAL at 00:47

## 2017-07-30 RX ADMIN — OXYCODONE AND ACETAMINOPHEN 2 TABLET(S): 5; 325 TABLET ORAL at 10:05

## 2017-07-30 RX ADMIN — OXYCODONE AND ACETAMINOPHEN 2 TABLET(S): 5; 325 TABLET ORAL at 05:54

## 2017-07-30 RX ADMIN — OXYCODONE AND ACETAMINOPHEN 2 TABLET(S): 5; 325 TABLET ORAL at 06:24

## 2017-07-30 RX ADMIN — OXYCODONE AND ACETAMINOPHEN 2 TABLET(S): 5; 325 TABLET ORAL at 00:17

## 2017-07-30 RX ADMIN — OXYCODONE AND ACETAMINOPHEN 2 TABLET(S): 5; 325 TABLET ORAL at 10:35

## 2017-07-30 NOTE — PROGRESS NOTE ADULT - SUBJECTIVE AND OBJECTIVE BOX
Subjective      No acute overnight events, pain controlled on oral medications      Objective  VS Afeb /70 HR 60 SpO2 98%  Void x 4 Flank [id number]    Gen: NAD  Abd: Soft, non-tender, non-distended  : Flank pouch draining minimal serous urine

## 2017-08-01 LAB — COMPN STONE: SIGNIFICANT CHANGE UP

## 2017-08-05 LAB — SURGICAL PATHOLOGY STUDY: SIGNIFICANT CHANGE UP

## 2017-08-16 ENCOUNTER — APPOINTMENT (OUTPATIENT)
Dept: UROLOGY | Facility: CLINIC | Age: 37
End: 2017-08-16

## 2017-10-04 ENCOUNTER — APPOINTMENT (OUTPATIENT)
Dept: UROLOGY | Facility: CLINIC | Age: 37
End: 2017-10-04
Payer: COMMERCIAL

## 2017-10-04 PROCEDURE — 99024 POSTOP FOLLOW-UP VISIT: CPT

## 2017-12-04 ENCOUNTER — APPOINTMENT (OUTPATIENT)
Dept: UROLOGY | Facility: CLINIC | Age: 37
End: 2017-12-04

## 2018-07-16 PROBLEM — N20.0 CALCULUS OF KIDNEY: Chronic | Status: ACTIVE | Noted: 2017-07-17

## 2018-07-16 PROBLEM — E11.9 TYPE 2 DIABETES MELLITUS WITHOUT COMPLICATIONS: Chronic | Status: ACTIVE | Noted: 2017-07-17

## 2021-12-29 ENCOUNTER — OUTPATIENT (OUTPATIENT)
Dept: OUTPATIENT SERVICES | Facility: HOSPITAL | Age: 41
LOS: 1 days | End: 2021-12-29

## 2021-12-29 DIAGNOSIS — Z20.822 CONTACT WITH AND (SUSPECTED) EXPOSURE TO COVID-19: ICD-10-CM

## 2021-12-29 PROBLEM — I86.1 SCROTAL VARICES: Chronic | Status: ACTIVE | Noted: 2017-07-17

## 2022-12-11 ENCOUNTER — INPATIENT (INPATIENT)
Facility: HOSPITAL | Age: 42
LOS: 6 days | Discharge: HOME HEALTH | End: 2022-12-18
Attending: STUDENT IN AN ORGANIZED HEALTH CARE EDUCATION/TRAINING PROGRAM | Admitting: STUDENT IN AN ORGANIZED HEALTH CARE EDUCATION/TRAINING PROGRAM
Payer: COMMERCIAL

## 2022-12-11 VITALS
SYSTOLIC BLOOD PRESSURE: 122 MMHG | OXYGEN SATURATION: 100 % | DIASTOLIC BLOOD PRESSURE: 80 MMHG | TEMPERATURE: 97 F | HEART RATE: 105 BPM | RESPIRATION RATE: 22 BRPM

## 2022-12-11 DIAGNOSIS — I21.3 ST ELEVATION (STEMI) MYOCARDIAL INFARCTION OF UNSPECIFIED SITE: ICD-10-CM

## 2022-12-11 LAB
A1C WITH ESTIMATED AVERAGE GLUCOSE RESULT: 10.1 % — HIGH (ref 4–5.6)
A1C WITH ESTIMATED AVERAGE GLUCOSE RESULT: 10.2 % — HIGH (ref 4–5.6)
ALBUMIN SERPL ELPH-MCNC: 3.8 G/DL — SIGNIFICANT CHANGE UP (ref 3.3–5)
ALBUMIN SERPL ELPH-MCNC: 4.4 G/DL — SIGNIFICANT CHANGE UP (ref 3.3–5)
ALBUMIN SERPL ELPH-MCNC: 4.7 G/DL — SIGNIFICANT CHANGE UP (ref 3.3–5)
ALP SERPL-CCNC: 53 U/L — SIGNIFICANT CHANGE UP (ref 40–120)
ALP SERPL-CCNC: 61 U/L — SIGNIFICANT CHANGE UP (ref 40–120)
ALP SERPL-CCNC: 62 U/L — SIGNIFICANT CHANGE UP (ref 40–120)
ALT FLD-CCNC: 100 U/L — HIGH (ref 4–41)
ALT FLD-CCNC: 113 U/L — HIGH (ref 4–41)
ALT FLD-CCNC: 41 U/L — SIGNIFICANT CHANGE UP (ref 4–41)
ANION GAP SERPL CALC-SCNC: 10 MMOL/L — SIGNIFICANT CHANGE UP (ref 7–14)
ANION GAP SERPL CALC-SCNC: 14 MMOL/L — SIGNIFICANT CHANGE UP (ref 7–14)
ANION GAP SERPL CALC-SCNC: 16 MMOL/L — HIGH (ref 7–14)
APTT BLD: 163 SEC — CRITICAL HIGH (ref 27–36.3)
APTT BLD: 26.6 SEC — LOW (ref 27–36.3)
APTT BLD: 26.7 SEC — LOW (ref 27–36.3)
APTT BLD: 30.8 SEC — SIGNIFICANT CHANGE UP (ref 27–36.3)
AST SERPL-CCNC: 264 U/L — HIGH (ref 4–40)
AST SERPL-CCNC: 39 U/L — SIGNIFICANT CHANGE UP (ref 4–40)
AST SERPL-CCNC: 617 U/L — HIGH (ref 4–40)
BASOPHILS # BLD AUTO: 0.03 K/UL — SIGNIFICANT CHANGE UP (ref 0–0.2)
BASOPHILS NFR BLD AUTO: 0.2 % — SIGNIFICANT CHANGE UP (ref 0–2)
BILIRUB SERPL-MCNC: 1.3 MG/DL — HIGH (ref 0.2–1.2)
BILIRUB SERPL-MCNC: 1.5 MG/DL — HIGH (ref 0.2–1.2)
BILIRUB SERPL-MCNC: 1.9 MG/DL — HIGH (ref 0.2–1.2)
BLOOD GAS VENOUS COMPREHENSIVE RESULT: SIGNIFICANT CHANGE UP
BUN SERPL-MCNC: 11 MG/DL — SIGNIFICANT CHANGE UP (ref 7–23)
BUN SERPL-MCNC: 12 MG/DL — SIGNIFICANT CHANGE UP (ref 7–23)
BUN SERPL-MCNC: 18 MG/DL — SIGNIFICANT CHANGE UP (ref 7–23)
CALCIUM SERPL-MCNC: 10.1 MG/DL — SIGNIFICANT CHANGE UP (ref 8.4–10.5)
CALCIUM SERPL-MCNC: 9.2 MG/DL — SIGNIFICANT CHANGE UP (ref 8.4–10.5)
CALCIUM SERPL-MCNC: 9.7 MG/DL — SIGNIFICANT CHANGE UP (ref 8.4–10.5)
CHLORIDE SERPL-SCNC: 91 MMOL/L — LOW (ref 98–107)
CHLORIDE SERPL-SCNC: 93 MMOL/L — LOW (ref 98–107)
CHLORIDE SERPL-SCNC: 97 MMOL/L — LOW (ref 98–107)
CHOLEST SERPL-MCNC: 192 MG/DL — SIGNIFICANT CHANGE UP
CHOLEST SERPL-MCNC: 200 MG/DL — HIGH
CK MB BLD-MCNC: 5.1 % — HIGH (ref 0–2.5)
CK MB BLD-MCNC: 5.6 % — HIGH (ref 0–2.5)
CK MB CFR SERPL CALC: 266.3 NG/ML — HIGH
CK MB CFR SERPL CALC: 345.1 NG/ML — HIGH
CK SERPL-CCNC: 4751 U/L — HIGH (ref 30–200)
CK SERPL-CCNC: 6814 U/L — HIGH (ref 30–200)
CO2 SERPL-SCNC: 21 MMOL/L — LOW (ref 22–31)
CO2 SERPL-SCNC: 23 MMOL/L — SIGNIFICANT CHANGE UP (ref 22–31)
CO2 SERPL-SCNC: 25 MMOL/L — SIGNIFICANT CHANGE UP (ref 22–31)
CREAT SERPL-MCNC: 0.62 MG/DL — SIGNIFICANT CHANGE UP (ref 0.5–1.3)
CREAT SERPL-MCNC: 0.81 MG/DL — SIGNIFICANT CHANGE UP (ref 0.5–1.3)
CREAT SERPL-MCNC: 0.86 MG/DL — SIGNIFICANT CHANGE UP (ref 0.5–1.3)
EGFR: 112 ML/MIN/1.73M2 — SIGNIFICANT CHANGE UP
EGFR: 114 ML/MIN/1.73M2 — SIGNIFICANT CHANGE UP
EGFR: 123 ML/MIN/1.73M2 — SIGNIFICANT CHANGE UP
EOSINOPHIL # BLD AUTO: 0.01 K/UL — SIGNIFICANT CHANGE UP (ref 0–0.5)
EOSINOPHIL NFR BLD AUTO: 0.1 % — SIGNIFICANT CHANGE UP (ref 0–6)
ESTIMATED AVERAGE GLUCOSE: 243 — SIGNIFICANT CHANGE UP
ESTIMATED AVERAGE GLUCOSE: 246 — SIGNIFICANT CHANGE UP
FLUAV AG NPH QL: SIGNIFICANT CHANGE UP
FLUBV AG NPH QL: SIGNIFICANT CHANGE UP
GLUCOSE BLDC GLUCOMTR-MCNC: 228 MG/DL — HIGH (ref 70–99)
GLUCOSE BLDC GLUCOMTR-MCNC: 241 MG/DL — HIGH (ref 70–99)
GLUCOSE BLDC GLUCOMTR-MCNC: 241 MG/DL — HIGH (ref 70–99)
GLUCOSE BLDC GLUCOMTR-MCNC: 252 MG/DL — HIGH (ref 70–99)
GLUCOSE BLDC GLUCOMTR-MCNC: 302 MG/DL — HIGH (ref 70–99)
GLUCOSE BLDC GLUCOMTR-MCNC: 326 MG/DL — HIGH (ref 70–99)
GLUCOSE SERPL-MCNC: 291 MG/DL — HIGH (ref 70–99)
GLUCOSE SERPL-MCNC: 306 MG/DL — HIGH (ref 70–99)
GLUCOSE SERPL-MCNC: 362 MG/DL — HIGH (ref 70–99)
HCT VFR BLD CALC: 48.5 % — SIGNIFICANT CHANGE UP (ref 39–50)
HCT VFR BLD CALC: 48.5 % — SIGNIFICANT CHANGE UP (ref 39–50)
HCT VFR BLD CALC: 50.1 % — HIGH (ref 39–50)
HCT VFR BLD CALC: 51.1 % — HIGH (ref 39–50)
HDLC SERPL-MCNC: 60 MG/DL — SIGNIFICANT CHANGE UP
HDLC SERPL-MCNC: 60 MG/DL — SIGNIFICANT CHANGE UP
HGB BLD-MCNC: 16 G/DL — SIGNIFICANT CHANGE UP (ref 13–17)
HGB BLD-MCNC: 16.4 G/DL — SIGNIFICANT CHANGE UP (ref 13–17)
HGB BLD-MCNC: 17 G/DL — SIGNIFICANT CHANGE UP (ref 13–17)
HGB BLD-MCNC: 17.2 G/DL — HIGH (ref 13–17)
IANC: 10.1 K/UL — HIGH (ref 1.8–7.4)
IANC: 9 K/UL — HIGH (ref 1.8–7.4)
IMM GRANULOCYTES NFR BLD AUTO: 0.8 % — SIGNIFICANT CHANGE UP (ref 0–0.9)
INR BLD: 1 RATIO — SIGNIFICANT CHANGE UP (ref 0.88–1.16)
INR BLD: 1.12 RATIO — SIGNIFICANT CHANGE UP (ref 0.88–1.16)
INR BLD: 1.18 RATIO — HIGH (ref 0.88–1.16)
LACTATE SERPL-SCNC: 1.7 MMOL/L — SIGNIFICANT CHANGE UP (ref 0.5–2)
LIPID PNL WITH DIRECT LDL SERPL: 121 MG/DL — HIGH
LIPID PNL WITH DIRECT LDL SERPL: 123 MG/DL — HIGH
LYMPHOCYTES # BLD AUTO: 1.42 K/UL — SIGNIFICANT CHANGE UP (ref 1–3.3)
LYMPHOCYTES # BLD AUTO: 11.7 % — LOW (ref 13–44)
MAGNESIUM SERPL-MCNC: 1.8 MG/DL — SIGNIFICANT CHANGE UP (ref 1.6–2.6)
MCHC RBC-ENTMCNC: 31.1 PG — SIGNIFICANT CHANGE UP (ref 27–34)
MCHC RBC-ENTMCNC: 31.2 PG — SIGNIFICANT CHANGE UP (ref 27–34)
MCHC RBC-ENTMCNC: 31.2 PG — SIGNIFICANT CHANGE UP (ref 27–34)
MCHC RBC-ENTMCNC: 31.3 PG — SIGNIFICANT CHANGE UP (ref 27–34)
MCHC RBC-ENTMCNC: 33 GM/DL — SIGNIFICANT CHANGE UP (ref 32–36)
MCHC RBC-ENTMCNC: 33.7 GM/DL — SIGNIFICANT CHANGE UP (ref 32–36)
MCHC RBC-ENTMCNC: 33.8 GM/DL — SIGNIFICANT CHANGE UP (ref 32–36)
MCHC RBC-ENTMCNC: 33.9 GM/DL — SIGNIFICANT CHANGE UP (ref 32–36)
MCV RBC AUTO: 91.9 FL — SIGNIFICANT CHANGE UP (ref 80–100)
MCV RBC AUTO: 92.3 FL — SIGNIFICANT CHANGE UP (ref 80–100)
MCV RBC AUTO: 92.7 FL — SIGNIFICANT CHANGE UP (ref 80–100)
MCV RBC AUTO: 94.5 FL — SIGNIFICANT CHANGE UP (ref 80–100)
MONOCYTES # BLD AUTO: 0.52 K/UL — SIGNIFICANT CHANGE UP (ref 0–0.9)
MONOCYTES NFR BLD AUTO: 4.3 % — SIGNIFICANT CHANGE UP (ref 2–14)
NEUTROPHILS # BLD AUTO: 10.1 K/UL — HIGH (ref 1.8–7.4)
NEUTROPHILS NFR BLD AUTO: 82.9 % — HIGH (ref 43–77)
NON HDL CHOLESTEROL: 132 MG/DL — HIGH
NON HDL CHOLESTEROL: 140 MG/DL — HIGH
NRBC # BLD: 0 /100 WBCS — SIGNIFICANT CHANGE UP (ref 0–0)
NRBC # FLD: 0 K/UL — SIGNIFICANT CHANGE UP (ref 0–0)
NT-PROBNP SERPL-SCNC: 562 PG/ML — HIGH
PHOSPHATE SERPL-MCNC: 3.3 MG/DL — SIGNIFICANT CHANGE UP (ref 2.5–4.5)
PLATELET # BLD AUTO: 203 K/UL — SIGNIFICANT CHANGE UP (ref 150–400)
PLATELET # BLD AUTO: 208 K/UL — SIGNIFICANT CHANGE UP (ref 150–400)
PLATELET # BLD AUTO: 209 K/UL — SIGNIFICANT CHANGE UP (ref 150–400)
PLATELET # BLD AUTO: 212 K/UL — SIGNIFICANT CHANGE UP (ref 150–400)
POTASSIUM SERPL-MCNC: 3.9 MMOL/L — SIGNIFICANT CHANGE UP (ref 3.5–5.3)
POTASSIUM SERPL-MCNC: 4 MMOL/L — SIGNIFICANT CHANGE UP (ref 3.5–5.3)
POTASSIUM SERPL-MCNC: 4.5 MMOL/L — SIGNIFICANT CHANGE UP (ref 3.5–5.3)
POTASSIUM SERPL-SCNC: 3.9 MMOL/L — SIGNIFICANT CHANGE UP (ref 3.5–5.3)
POTASSIUM SERPL-SCNC: 4 MMOL/L — SIGNIFICANT CHANGE UP (ref 3.5–5.3)
POTASSIUM SERPL-SCNC: 4.5 MMOL/L — SIGNIFICANT CHANGE UP (ref 3.5–5.3)
PROT SERPL-MCNC: 6.4 G/DL — SIGNIFICANT CHANGE UP (ref 6–8.3)
PROT SERPL-MCNC: 7.1 G/DL — SIGNIFICANT CHANGE UP (ref 6–8.3)
PROT SERPL-MCNC: 7.6 G/DL — SIGNIFICANT CHANGE UP (ref 6–8.3)
PROTHROM AB SERPL-ACNC: 11.6 SEC — SIGNIFICANT CHANGE UP (ref 10.5–13.4)
PROTHROM AB SERPL-ACNC: 13 SEC — SIGNIFICANT CHANGE UP (ref 10.5–13.4)
PROTHROM AB SERPL-ACNC: 13.7 SEC — HIGH (ref 10.5–13.4)
RBC # BLD: 5.13 M/UL — SIGNIFICANT CHANGE UP (ref 4.2–5.8)
RBC # BLD: 5.28 M/UL — SIGNIFICANT CHANGE UP (ref 4.2–5.8)
RBC # BLD: 5.43 M/UL — SIGNIFICANT CHANGE UP (ref 4.2–5.8)
RBC # BLD: 5.51 M/UL — SIGNIFICANT CHANGE UP (ref 4.2–5.8)
RBC # FLD: 11.9 % — SIGNIFICANT CHANGE UP (ref 10.3–14.5)
RBC # FLD: 11.9 % — SIGNIFICANT CHANGE UP (ref 10.3–14.5)
RBC # FLD: 12.1 % — SIGNIFICANT CHANGE UP (ref 10.3–14.5)
RBC # FLD: 12.2 % — SIGNIFICANT CHANGE UP (ref 10.3–14.5)
RSV RNA NPH QL NAA+NON-PROBE: SIGNIFICANT CHANGE UP
SARS-COV-2 RNA SPEC QL NAA+PROBE: SIGNIFICANT CHANGE UP
SODIUM SERPL-SCNC: 130 MMOL/L — LOW (ref 135–145)
TRIGL SERPL-MCNC: 53 MG/DL — SIGNIFICANT CHANGE UP
TRIGL SERPL-MCNC: 85 MG/DL — SIGNIFICANT CHANGE UP
TROPONIN T, HIGH SENSITIVITY RESULT: 205 NG/L — CRITICAL HIGH
TROPONIN T, HIGH SENSITIVITY RESULT: 6440 NG/L — CRITICAL HIGH
TROPONIN T, HIGH SENSITIVITY RESULT: CRITICAL HIGH NG/L
TSH SERPL-MCNC: 0.6 UIU/ML — SIGNIFICANT CHANGE UP (ref 0.27–4.2)
TSH SERPL-MCNC: 1.16 UIU/ML — SIGNIFICANT CHANGE UP (ref 0.27–4.2)
WBC # BLD: 12.18 K/UL — HIGH (ref 3.8–10.5)
WBC # BLD: 13.15 K/UL — HIGH (ref 3.8–10.5)
WBC # BLD: 14.07 K/UL — HIGH (ref 3.8–10.5)
WBC # BLD: 14.44 K/UL — HIGH (ref 3.8–10.5)
WBC # FLD AUTO: 12.18 K/UL — HIGH (ref 3.8–10.5)
WBC # FLD AUTO: 13.15 K/UL — HIGH (ref 3.8–10.5)
WBC # FLD AUTO: 14.07 K/UL — HIGH (ref 3.8–10.5)
WBC # FLD AUTO: 14.44 K/UL — HIGH (ref 3.8–10.5)

## 2022-12-11 PROCEDURE — 99223 1ST HOSP IP/OBS HIGH 75: CPT

## 2022-12-11 PROCEDURE — 71045 X-RAY EXAM CHEST 1 VIEW: CPT | Mod: 26

## 2022-12-11 PROCEDURE — 92921: CPT | Mod: LD

## 2022-12-11 PROCEDURE — 92978 ENDOLUMINL IVUS OCT C 1ST: CPT | Mod: 26,LD

## 2022-12-11 PROCEDURE — 93010 ELECTROCARDIOGRAM REPORT: CPT

## 2022-12-11 PROCEDURE — 93458 L HRT ARTERY/VENTRICLE ANGIO: CPT | Mod: 26,59

## 2022-12-11 PROCEDURE — 99291 CRITICAL CARE FIRST HOUR: CPT

## 2022-12-11 PROCEDURE — 93306 TTE W/DOPPLER COMPLETE: CPT | Mod: 26

## 2022-12-11 PROCEDURE — 92941 PRQ TRLML REVSC TOT OCCL AMI: CPT | Mod: LD

## 2022-12-11 RX ORDER — NITROGLYCERIN 6.5 MG
0.4 CAPSULE, EXTENDED RELEASE ORAL ONCE
Refills: 0 | Status: COMPLETED | OUTPATIENT
Start: 2022-12-11 | End: 2022-12-11

## 2022-12-11 RX ORDER — INFLUENZA VIRUS VACCINE 15; 15; 15; 15 UG/.5ML; UG/.5ML; UG/.5ML; UG/.5ML
0.5 SUSPENSION INTRAMUSCULAR ONCE
Refills: 0 | Status: DISCONTINUED | OUTPATIENT
Start: 2022-12-11 | End: 2022-12-18

## 2022-12-11 RX ORDER — ACETAMINOPHEN 500 MG
650 TABLET ORAL EVERY 6 HOURS
Refills: 0 | Status: DISCONTINUED | OUTPATIENT
Start: 2022-12-11 | End: 2022-12-18

## 2022-12-11 RX ORDER — METOPROLOL TARTRATE 50 MG
25 TABLET ORAL
Refills: 0 | Status: DISCONTINUED | OUTPATIENT
Start: 2022-12-11 | End: 2022-12-13

## 2022-12-11 RX ORDER — HEPARIN SODIUM 5000 [USP'U]/ML
5000 INJECTION INTRAVENOUS; SUBCUTANEOUS EVERY 8 HOURS
Refills: 0 | Status: DISCONTINUED | OUTPATIENT
Start: 2022-12-11 | End: 2022-12-13

## 2022-12-11 RX ORDER — HEPARIN SODIUM 5000 [USP'U]/ML
INJECTION INTRAVENOUS; SUBCUTANEOUS
Qty: 25000 | Refills: 0 | Status: DISCONTINUED | OUTPATIENT
Start: 2022-12-11 | End: 2022-12-11

## 2022-12-11 RX ORDER — TICAGRELOR 90 MG/1
90 TABLET ORAL EVERY 12 HOURS
Refills: 0 | Status: DISCONTINUED | OUTPATIENT
Start: 2022-12-11 | End: 2022-12-16

## 2022-12-11 RX ORDER — COLCHICINE 0.6 MG
0.6 TABLET ORAL EVERY 12 HOURS
Refills: 0 | Status: DISCONTINUED | OUTPATIENT
Start: 2022-12-11 | End: 2022-12-18

## 2022-12-11 RX ORDER — HEPARIN SODIUM 5000 [USP'U]/ML
4000 INJECTION INTRAVENOUS; SUBCUTANEOUS ONCE
Refills: 0 | Status: COMPLETED | OUTPATIENT
Start: 2022-12-11 | End: 2022-12-11

## 2022-12-11 RX ORDER — TICAGRELOR 90 MG/1
180 TABLET ORAL ONCE
Refills: 0 | Status: COMPLETED | OUTPATIENT
Start: 2022-12-11 | End: 2022-12-11

## 2022-12-11 RX ORDER — DEXTROSE 50 % IN WATER 50 %
25 SYRINGE (ML) INTRAVENOUS ONCE
Refills: 0 | Status: DISCONTINUED | OUTPATIENT
Start: 2022-12-11 | End: 2022-12-18

## 2022-12-11 RX ORDER — INSULIN LISPRO 100/ML
VIAL (ML) SUBCUTANEOUS
Refills: 0 | Status: DISCONTINUED | OUTPATIENT
Start: 2022-12-11 | End: 2022-12-12

## 2022-12-11 RX ORDER — ACETAMINOPHEN 500 MG
650 TABLET ORAL ONCE
Refills: 0 | Status: COMPLETED | OUTPATIENT
Start: 2022-12-11 | End: 2022-12-11

## 2022-12-11 RX ORDER — DEXTROSE 50 % IN WATER 50 %
15 SYRINGE (ML) INTRAVENOUS ONCE
Refills: 0 | Status: DISCONTINUED | OUTPATIENT
Start: 2022-12-11 | End: 2022-12-18

## 2022-12-11 RX ORDER — BENZOCAINE AND MENTHOL 5; 1 G/100ML; G/100ML
1 LIQUID ORAL ONCE
Refills: 0 | Status: COMPLETED | OUTPATIENT
Start: 2022-12-11 | End: 2022-12-11

## 2022-12-11 RX ORDER — FENTANYL CITRATE 50 UG/ML
25 INJECTION INTRAVENOUS ONCE
Refills: 0 | Status: DISCONTINUED | OUTPATIENT
Start: 2022-12-11 | End: 2022-12-11

## 2022-12-11 RX ORDER — GLUCAGON INJECTION, SOLUTION 0.5 MG/.1ML
1 INJECTION, SOLUTION SUBCUTANEOUS ONCE
Refills: 0 | Status: DISCONTINUED | OUTPATIENT
Start: 2022-12-11 | End: 2022-12-18

## 2022-12-11 RX ORDER — NITROGLYCERIN 6.5 MG
0.4 CAPSULE, EXTENDED RELEASE ORAL
Refills: 0 | Status: DISCONTINUED | OUTPATIENT
Start: 2022-12-11 | End: 2022-12-11

## 2022-12-11 RX ORDER — MAGNESIUM SULFATE 500 MG/ML
2 VIAL (ML) INJECTION ONCE
Refills: 0 | Status: COMPLETED | OUTPATIENT
Start: 2022-12-11 | End: 2022-12-11

## 2022-12-11 RX ORDER — SODIUM CHLORIDE 9 MG/ML
1000 INJECTION, SOLUTION INTRAVENOUS
Refills: 0 | Status: DISCONTINUED | OUTPATIENT
Start: 2022-12-11 | End: 2022-12-18

## 2022-12-11 RX ORDER — CHLORHEXIDINE GLUCONATE 213 G/1000ML
1 SOLUTION TOPICAL
Refills: 0 | Status: DISCONTINUED | OUTPATIENT
Start: 2022-12-11 | End: 2022-12-18

## 2022-12-11 RX ORDER — POTASSIUM CHLORIDE 20 MEQ
20 PACKET (EA) ORAL ONCE
Refills: 0 | Status: COMPLETED | OUTPATIENT
Start: 2022-12-11 | End: 2022-12-11

## 2022-12-11 RX ORDER — SODIUM CHLORIDE 9 MG/ML
250 INJECTION INTRAMUSCULAR; INTRAVENOUS; SUBCUTANEOUS ONCE
Refills: 0 | Status: COMPLETED | OUTPATIENT
Start: 2022-12-11 | End: 2022-12-11

## 2022-12-11 RX ORDER — ATORVASTATIN CALCIUM 80 MG/1
80 TABLET, FILM COATED ORAL AT BEDTIME
Refills: 0 | Status: DISCONTINUED | OUTPATIENT
Start: 2022-12-11 | End: 2022-12-18

## 2022-12-11 RX ORDER — ACETAMINOPHEN 500 MG
325 TABLET ORAL ONCE
Refills: 0 | Status: COMPLETED | OUTPATIENT
Start: 2022-12-11 | End: 2022-12-11

## 2022-12-11 RX ORDER — DEXTROSE 50 % IN WATER 50 %
12.5 SYRINGE (ML) INTRAVENOUS ONCE
Refills: 0 | Status: DISCONTINUED | OUTPATIENT
Start: 2022-12-11 | End: 2022-12-18

## 2022-12-11 RX ORDER — ASPIRIN/CALCIUM CARB/MAGNESIUM 324 MG
324 TABLET ORAL ONCE
Refills: 0 | Status: COMPLETED | OUTPATIENT
Start: 2022-12-11 | End: 2022-12-11

## 2022-12-11 RX ORDER — INSULIN LISPRO 100/ML
VIAL (ML) SUBCUTANEOUS AT BEDTIME
Refills: 0 | Status: DISCONTINUED | OUTPATIENT
Start: 2022-12-11 | End: 2022-12-12

## 2022-12-11 RX ORDER — HEPARIN SODIUM 5000 [USP'U]/ML
4000 INJECTION INTRAVENOUS; SUBCUTANEOUS EVERY 6 HOURS
Refills: 0 | Status: DISCONTINUED | OUTPATIENT
Start: 2022-12-11 | End: 2022-12-11

## 2022-12-11 RX ORDER — ASPIRIN/CALCIUM CARB/MAGNESIUM 324 MG
650 TABLET ORAL EVERY 8 HOURS
Refills: 0 | Status: DISCONTINUED | OUTPATIENT
Start: 2022-12-11 | End: 2022-12-13

## 2022-12-11 RX ADMIN — SODIUM CHLORIDE 250 MILLILITER(S): 9 INJECTION INTRAMUSCULAR; INTRAVENOUS; SUBCUTANEOUS at 22:20

## 2022-12-11 RX ADMIN — HEPARIN SODIUM 5000 UNIT(S): 5000 INJECTION INTRAVENOUS; SUBCUTANEOUS at 21:57

## 2022-12-11 RX ADMIN — Medication 325 MILLIGRAM(S): at 22:20

## 2022-12-11 RX ADMIN — Medication 325 MILLIGRAM(S): at 23:00

## 2022-12-11 RX ADMIN — TICAGRELOR 180 MILLIGRAM(S): 90 TABLET ORAL at 02:12

## 2022-12-11 RX ADMIN — Medication 650 MILLIGRAM(S): at 13:33

## 2022-12-11 RX ADMIN — HEPARIN SODIUM 4000 UNIT(S): 5000 INJECTION INTRAVENOUS; SUBCUTANEOUS at 01:40

## 2022-12-11 RX ADMIN — Medication 25 GRAM(S): at 06:10

## 2022-12-11 RX ADMIN — FENTANYL CITRATE 25 MICROGRAM(S): 50 INJECTION INTRAVENOUS at 04:00

## 2022-12-11 RX ADMIN — HEPARIN SODIUM 5000 UNIT(S): 5000 INJECTION INTRAVENOUS; SUBCUTANEOUS at 13:33

## 2022-12-11 RX ADMIN — Medication 25 MILLIGRAM(S): at 17:11

## 2022-12-11 RX ADMIN — FENTANYL CITRATE 25 MICROGRAM(S): 50 INJECTION INTRAVENOUS at 06:10

## 2022-12-11 RX ADMIN — Medication 20 MILLIEQUIVALENT(S): at 06:10

## 2022-12-11 RX ADMIN — Medication 650 MILLIGRAM(S): at 18:33

## 2022-12-11 RX ADMIN — Medication 25 MILLIGRAM(S): at 12:21

## 2022-12-11 RX ADMIN — Medication 0.6 MILLIGRAM(S): at 14:50

## 2022-12-11 RX ADMIN — Medication 0.4 MILLIGRAM(S): at 01:45

## 2022-12-11 RX ADMIN — Medication 650 MILLIGRAM(S): at 11:33

## 2022-12-11 RX ADMIN — HEPARIN SODIUM 1000 UNIT(S)/HR: 5000 INJECTION INTRAVENOUS; SUBCUTANEOUS at 01:40

## 2022-12-11 RX ADMIN — ATORVASTATIN CALCIUM 80 MILLIGRAM(S): 80 TABLET, FILM COATED ORAL at 21:57

## 2022-12-11 RX ADMIN — Medication 6: at 11:24

## 2022-12-11 RX ADMIN — Medication 4: at 16:54

## 2022-12-11 RX ADMIN — Medication 0.4 MILLIGRAM(S): at 02:12

## 2022-12-11 RX ADMIN — Medication 4: at 08:51

## 2022-12-11 RX ADMIN — Medication 650 MILLIGRAM(S): at 12:00

## 2022-12-11 RX ADMIN — Medication 324 MILLIGRAM(S): at 02:11

## 2022-12-11 RX ADMIN — TICAGRELOR 90 MILLIGRAM(S): 90 TABLET ORAL at 13:33

## 2022-12-11 RX ADMIN — FENTANYL CITRATE 25 MICROGRAM(S): 50 INJECTION INTRAVENOUS at 04:15

## 2022-12-11 RX ADMIN — Medication 650 MILLIGRAM(S): at 21:57

## 2022-12-11 RX ADMIN — FENTANYL CITRATE 25 MICROGRAM(S): 50 INJECTION INTRAVENOUS at 06:15

## 2022-12-11 RX ADMIN — CHLORHEXIDINE GLUCONATE 1 APPLICATION(S): 213 SOLUTION TOPICAL at 05:32

## 2022-12-11 NOTE — CHART NOTE - NSCHARTNOTEFT_GEN_A_CORE
[facility] NIGHT MEDICINE COVERAGE    Notified by RN that pt has fever of 100.9.  Pt s/p cath for STEMI, being treated for possible pericarditis.  Tylenol 325mg PO x1 ordered.  Pt assessed at bedside.  Pt reports feeling well, only reports sore throat.  Denies chest pain, difficulty breathing, N/V/D, or abdominal pain.  VSS.    Vital Signs Last 24 Hrs  T(C): 37.2 (12 Dec 2022 00:52), Max: 38.3 (11 Dec 2022 21:44)  T(F): 99 (12 Dec 2022 00:52), Max: 100.9 (11 Dec 2022 21:44)  HR: 114 (12 Dec 2022 00:52) (94 - 119)  BP: 101/64 (12 Dec 2022 00:52) (101/64 - 147/105)  BP(mean): 100 (11 Dec 2022 17:00) (72 - 118)  RR: 18 (12 Dec 2022 00:52) (13 - 25)  SpO2: 98% (12 Dec 2022 00:52) (96% - 100%)    O2 Parameters below as of 12 Dec 2022 00:52  Patient On (Oxygen Delivery Method): room air    Physical Exam:  GS: A&Ox3, in NAD  Lungs: CTA b/l, w/o wheezes, rales, or rhonchi  Heart: RRR, +S1S2, +S3 noted, w/o murmur, rub,   Abdomen: Soft, NT/ND, +BS, no rebound or guarding noted  Extremities: Warm, well perfused, 2+ UE/LE pulses b/l, no LE edema noted b/l  Neuro: Non-focal, MELO x4    Plan:  -Sepsis w/u ordered - BCx x2, Lactate, Procal, CMP, CBC, and RVP.  -Tylenol PRN for fever  -Losenge ordered for sore throat  -EKG and cardiac enzymes given prior STEMI and s/p cath  -250cc bolus of NS given low EF.  -CCU NP made aware.    Plan d/w RN and pt, all questions answered.  Pt stable at this time, will continue to monitor.    Luke Watkins PA-C  Department of Medicine - [facility]  In-House Pager: #[phone] [facility] NIGHT MEDICINE COVERAGE    Notified by RN that pt has fever of 100.9.  Pt s/p cath for STEMI, being treated for possible pericarditis.  Tylenol 325mg PO x1 ordered.  Pt assessed at bedside.  Pt reports feeling well, only reports sore throat.  Denies chest pain, difficulty breathing, N/V/D, or abdominal pain.  VSS.    Vital Signs Last 24 Hrs  T(C): 37.2 (12 Dec 2022 00:52), Max: 38.3 (11 Dec 2022 21:44)  T(F): 99 (12 Dec 2022 00:52), Max: 100.9 (11 Dec 2022 21:44)  HR: 114 (12 Dec 2022 00:52) (94 - 119)  BP: 101/64 (12 Dec 2022 00:52) (101/64 - 147/105)  BP(mean): 100 (11 Dec 2022 17:00) (72 - 118)  RR: 18 (12 Dec 2022 00:52) (13 - 25)  SpO2: 98% (12 Dec 2022 00:52) (96% - 100%)    O2 Parameters below as of 12 Dec 2022 00:52  Patient On (Oxygen Delivery Method): room air    Physical Exam:  GS: A&Ox3, in NAD  Lungs: CTA b/l, w/o wheezes, rales, or rhonchi  Heart: RRR, +S1S2, +S3 noted, w/o murmur, rub,   Abdomen: Soft, NT/ND, +BS, no rebound or guarding noted  Extremities: Warm, well perfused, 2+ UE/LE pulses b/l, no LE edema noted b/l  Neuro: Non-focal, MELO x4    Plan:  -Sepsis w/u ordered - BCx x2, Lactate, Procal, UA, CMP, CBC, and RVP.  -Tylenol PRN for fever  -Losenge ordered for sore throat  -EKG and cardiac enzymes given prior STEMI and s/p cath - troponin downtrending  -250cc bolus of NS given low EF.  -CCU NP made aware - EKG improved from post cath, will repeat EKG and cardiac enzymes in AM per CCU recs.     Plan d/w RN and pt, all questions answered.  Pt stable at this time, will continue to monitor.    ALDO HammondsC  Department of Medicine - [facility]  In-House Pager: #[phone]

## 2022-12-11 NOTE — H&P ADULT - NS ATTEND AMEND GEN_ALL_CORE FT
Patient is a 42 year old man smoker with pre-diabetes presenting with acute anterior STEMI and s/p PCI to prox LAD on 12/10.    To continue maximal medical therapy  smoking cessation counselling  Check echo  telemetry

## 2022-12-11 NOTE — PROGRESS NOTE ADULT - ASSESSMENT
42 y/p Omani male with h/o smoking and pre DM2 presents with chest pain - + AWSTEMI and now s/p JESUS to pLAD.       NEURO:  - A & O X 3  - no active issues    RESP:  - O2 sat stable on RA      CV:  # AWSTEMI:  - ss/p JESUS to pLAD  - continue DAPT, lipitor  - start metoprolol   - check ECHO  - trend cardiac enzymes until trending down    GI:  -no active issues    /renal  -has hx of kidney stones  -no issues at this time  - trend Scr    ENDO:  # DM2  - h/o preDM but A1c is 10.1  - continue diet, FS, and IHSS at this time  - endocrine consult called, await recs    ID:  #leukocytosis  - likely in setting of acute MI  - no fever  - continue to monitor    DVT PPx:  - Heparin SQ  - OOB as tolerated   42 y/p Burmese male with h/o smoking and pre DM2 presents with chest pain - + AWSTEMI and now s/p JESUS to pLAD.       NEURO:  - A & O X 3  - no active issues    RESP:  - O2 sat stable on RA      CV:  # AWSTEMI:  - ss/p JESUS to pLAD  - continue DAPT, lipitor  - start metoprolol 25 mg po BID  - check ECHO  - trend cardiac enzymes until trending down    GI:  -no active issues    /renal  -has hx of kidney stones  -no issues at this time  - trend Scr    ENDO:  # DM2  - h/o preDM but A1c is 10.1  - continue diet, FS, and IHSS at this time  - endocrine consult called, await recs    ID:  #leukocytosis  - likely in setting of acute MI  - no fever  - continue to monitor    DVT PPx:  - Heparin SQ  - OOB as tolerated   42 y/p Qat[place] male with h/o smoking and pre DM2 presents with chest pain - + AWSTEMI and now s/p JESUS to pLAD and POBA to DIAG.       NEURO:  - A & O X 3  - no active issues    RESP:  - O2 sat stable on RA      CV:  # AWSTEMI:  - ss/p JESUS to pLAD  - continue DAPT, lipitor  - start metoprolol 25 mg po BID  - check ECHO  - trend cardiac enzymes until trending down    GI:  -no active issues    /renal  -has hx of kidney stones  -no issues at this time  - trend Scr    ENDO:  # DM2  - h/o preDM but A1c is 10.1  - continue diet, FS, and IHSS at this time  - endocrine consult called, await recs    ID:  #leukocytosis  - likely in setting of acute MI  - no fever  - continue to monitor    DVT PPx:  - Heparin SQ  - OOB as tolerated

## 2022-12-11 NOTE — H&P ADULT - ASSESSMENT
Patient is a 42 year old male with PMH of smoking and pre-diabetes, presents with AWSTEMI,    PLAN:  Admit to the CCU post procedure for continuous telemetry monitoring   Patient is a 42 year old male with PMH of smoking and pre-diabetes, presents with AWSTEMI,    PLAN:  -Admit to the CCU post procedure for continuous telemetry monitoring  -Admission labs to include CMP with mag and phos, CBC, serial cardiac enzymes x3, lipid profile, TSH, HGBA1c, serum proBNP  -ECHO in am    NEURO:  Alert and oriented x 3    RESP:  No respiratory distress on room air    CARDIAC:  AWSTEMI:  LHC done-s/p JESUS x 1 to pLAD LVEDP 17  -continue aspirin and brilinta, lipitor 80  -will initiate BB later this am  -ECHO in am  -serial cardiac enzymes    GI:  -no issues  -will start DASH diet    :  -has hx of kidney stones  -no issues at this time    ENDO:  -awaiting HGBA1c       Patient is a 42 year old male with PMH of smoking and pre-diabetes, presenting with anterior STEMI.    PLAN:  - cardiac cath  - Admit to the CCU post procedure for continuous telemetry monitoring  - Admission labs to include CMP with mag and phos, CBC, serial cardiac enzymes x3, lipid profile, TSH, HGBA1c, serum proBNP  - ECHO in am    NEURO:  Alert and oriented x 3    RESP:  No respiratory distress on room air    CARDIAC:  AWSTEMI:  LHC done-s/p JESUS x 1 to pLAD LVEDP 17  -continue aspirin and brilinta, lipitor 80  -will initiate BB later this am  -ECHO in am  -serial cardiac enzymes    GI:  -no issues  -will start DASH diet    :  -has hx of kidney stones  -no issues at this time    ENDO:  -awaiting HGBA1c

## 2022-12-11 NOTE — ED ADULT TRIAGE NOTE - CHIEF COMPLAINT QUOTE
Patient c/o chest pain and N/V since tuesday worsening today. Patient appears uncomfortable and diaphoretic in triage. EKG obtained - Charge RN notified. Patient brought back to room 13. unable to obtain VS in triage.

## 2022-12-11 NOTE — PROGRESS NOTE ADULT - SUBJECTIVE AND OBJECTIVE BOX
Subjective/Objective: Resting in bed, c/o 4/10 chest discomfort with inspiration to neck which improved with sitting up. EKG without changes.    MEDICATIONS  (STANDING):  atorvastatin 80 milliGRAM(s) Oral at bedtime  chlorhexidine 2% Cloths 1 Application(s) Topical <User Schedule>  dextrose 5%. 1000 milliLiter(s) (100 mL/Hr) IV Continuous <Continuous>  dextrose 5%. 1000 milliLiter(s) (50 mL/Hr) IV Continuous <Continuous>  dextrose 50% Injectable 25 Gram(s) IV Push once  dextrose 50% Injectable 12.5 Gram(s) IV Push once  dextrose 50% Injectable 25 Gram(s) IV Push once  glucagon  Injectable 1 milliGRAM(s) IntraMuscular once  influenza   Vaccine 0.5 milliLiter(s) IntraMuscular once  insulin lispro (ADMELOG) corrective regimen sliding scale   SubCutaneous three times a day before meals  insulin lispro (ADMELOG) corrective regimen sliding scale   SubCutaneous at bedtime  ticagrelor 90 milliGRAM(s) Oral every 12 hours    MEDICATIONS  (PRN):  dextrose Oral Gel 15 Gram(s) Oral once PRN Blood Glucose LESS THAN 70 milliGRAM(s)/deciliter  heparin   Injectable 4000 Unit(s) IV Push every 6 hours PRN For aPTT less than 40  nitroglycerin     SubLingual 0.4 milliGRAM(s) SubLingual every 5 minutes PRN Chest Pain      Vital Signs Last 24 Hrs  T(C): 36.8 (11 Dec 2022 08:00), Max: 36.9 (11 Dec 2022 03:17)  T(F): 98.3 (11 Dec 2022 08:00), Max: 98.5 (11 Dec 2022 03:17)  HR: 116 (11 Dec 2022 09:00) (94 - 116)  BP: 109/93 (11 Dec 2022 09:00) (109/93 - 147/105)  BP(mean): 100 (11 Dec 2022 09:00) (100 - 118)  RR: 14 (11 Dec 2022 09:00) (13 - 24)  SpO2: 100% (11 Dec 2022 09:00) (97% - 100%)    Parameters below as of 11 Dec 2022 06:20  Patient On (Oxygen Delivery Method): nasal cannula  O2 Flow (L/min): 2    I&O's Detail    10 Dec 2022 07:01  -  11 Dec 2022 07:00  --------------------------------------------------------  IN:    IV PiggyBack: 250 mL    Oral Fluid: 240 mL  Total IN: 490 mL    OUT:    Voided (mL): 700 mL  Total OUT: 700 mL    Total NET: -210 mL      11 Dec 2022 07:01  -  11 Dec 2022 10:24  --------------------------------------------------------  IN:  Total IN: 0 mL    OUT:    Voided (mL): 675 mL  Total OUT: 675 mL    Total NET: -675 mL    PHYSICAL EXAM  GEN: mild distress, skin W & D  RESP: CTA ant  CV: nl S1S2, no M/R/C  GI: soft, NT/ND, BS +  EXT: non C/C/E. RFA site clean and dry, no evidence of hematoma  NEURO: A & O X 3    EKG/ TELEM: NSR [facility]    LABS:                          16.4   14.44 )-----------( 209      ( 11 Dec 2022 09:20 )             48.5     PT/INR - ( 11 Dec 2022 04:27 )   PT: 13.0 sec;   INR: 1.12 ratio         PTT - ( 11 Dec 2022 04:27 )  PTT:163.0 sec  11 Dec 2022 04:27    130<L>  |  93<L>  |  11     ----------------------------<  306<H>  3.9     |  21<L>  |  0.62     11 Dec 2022 01:20    130<L>  |  91<L>  |  12     ----------------------------<  362<H>  4.0     |  25     |  0.81     Ca    9.7        11 Dec 2022 04:27  Ca    10.1       11 Dec 2022 01:20  Phos  3.3       11 Dec 2022 04:27  Mg     1.80      11 Dec 2022 04:27    TPro  7.1    /  Alb  4.4    /  TBili  1.9<H>  /  DBili  x      /  AST  617<H>  /  ALT  113<H>  /  AlkPhos  61     11 Dec 2022 04:27  TPro  7.6    /  Alb  4.7    /  TBili  1.3<H>  /  DBili  x      /  AST  39     /  ALT  41     /  AlkPhos  62     11 Dec 2022 01:20    CARDIAC MARKERS ( 11 Dec 2022 04:27 )  x     / x     / 6814 U/L / x     / 345.1 ng/mL        Creatine Kinase, Serum: 6814 U/L (12-11-22 @ 04:27)

## 2022-12-11 NOTE — H&P ADULT - NSHPPHYSICALEXAM_GEN_ALL_CORE
PHYSICAL EXAM:    GENERAL: NAD, in distress due to pain  HEAD:  Atraumatic, Normocephalic  EYES: EOMI, PERRLA, conjunctiva and sclera clear  NECK: Supple, No JVD  CHEST/LUNG: Clear to auscultation bilaterally; No rhonchis, rales, or wheezing  HEART: Regular rate and rhythm; S1, S2; No murmurs, rubs, or gallops  ABDOMEN: Soft, Nontender, Nondistended; Normoactive bowel sounds  EXTREMITIES:  2+ Peripheral Pulses, No clubbing, cyanosis, or edema  PSYCH: A&Ox3  NEUROLOGY: non-focal  SKIN: No rashes or lesions PHYSICAL EXAM:    GENERAL: NAD, in distress due to pain  HEAD:  Atraumatic, Normocephalic  EYES: EOMI, PERRLA, conjunctiva and sclera clear  NECK: Supple, No JVD  CHEST/LUNG: Clear to auscultation bilaterally;   HEART: Regular rate and rhythm; S1, S2; No murmurs, rubs, or gallop  ABDOMEN: Soft, Nontender, Nondistended; Normoactive bowel sounds  EXTREMITIES:  2+ Peripheral Pulses, No clubbing, cyanosis, or edema  PSYCH: A&Ox3  NEUROLOGY: non-focal  SKIN: No rashes or lesions

## 2022-12-11 NOTE — ED PROVIDER NOTE - NSICDXPASTMEDICALHX_GEN_ALL_CORE_FT
PAST MEDICAL HISTORY:  Controlled type 2 diabetes mellitus without complication, unspecified long term insulin use status Pt reports newly diagnosed 1 month ago with DM 2, started on insulin and states FS are less than 100 in AM    Kidney stones right    Varicocele present on ultrasound of scrotum

## 2022-12-11 NOTE — CHART NOTE - NSCHARTNOTEFT_GEN_A_CORE
Right femoral sheath removed at 6:16 am. There was no bleeding or hematoma. Patient had mild vasovagal response. SBP dropped from 130s to 90s.  NS 250cc IV bolus given. Hemostasis achieved. No bleeding, no hematoma. Post procedure orders to be followed.

## 2022-12-11 NOTE — ED PROVIDER NOTE - OBJECTIVE STATEMENT
42M pmh DM, current smoker (10 year pack hx) who presents with chest pain as STEMI.  He has 3 hour substernal CP radiating to jaw, both arms, 10/10.  Had similar pain x2 days, but this now worse, constant, onset today while at work at [employer]. 42M pmh DM, current smoker (10 year pack hx) who presents with chest pain as STEMI.  He has 3 hour substernal CP radiating to jaw, both arms, 10/10.  Had similar pain x2 days, but this now worse, constant, onset today while at work at [employer]. +diaphoresis 42M pmh pre-DM, current smoker (10 year pack hx) who presents with chest pain as STEMI.  He has 3 hour substernal CP radiating to jaw, both arms, 10/10.  Had similar pain x2 days, but this now worse, constant, onset today while at work at [employer]. +diaphoresis

## 2022-12-11 NOTE — H&P ADULT - HISTORY OF PRESENT ILLNESS
Patient is a 42-year-old Beninese male with history of prediabetes not on medication, active smoker, presents with 10/10 chest pain. Patient reports he had chest pain 2 days ago, intermittently. Today, pain was constant in the center of his chest, started to get much worse this evening. Patient was brought to hospital by his sister-in-law, his wife is home with a baby. Patient endorses the pain radiates to the jaw and bilateral arms. Patient denies fever, chills, nausea, vomitting or diarrhea. He denies SOB.Denies associated fever, shortness of breath, nausea or vomiting, leg pain or swelling.  Well appearing, lying comfortably in stretcher, awake and alert, nontoxic.  VSS.  Lungs cta bl.  Cards nl S1/S2, RRR, no MRG.  Abd soft ntnd.  No pedal edema or calf tenderness.  Initial EKG with ST elevations in anterior lateral leads with reciprocal inferior depressions.  STEMI code called–spoke with interventionalists Dr. Jones, who plans to activate cath lab.  Labs, XR, tele, heparin/asa/brilinta, ccu np aware.Patient is a 42 year old male with PMH of occasional smoking, presents with 10 out of 10 chest pressure in the center of his chest that started    Patient is a 42-year-old [place]serratian male with history of prediabetes not on medication, active smoker, presents with 10/10 chest pain. Patient reports he had chest pain 2 days ago, intermittently. Today, pain was constant in the center of his chest, started to get much worse this evening. Patient was brought to hospital by his sister-in-law,( his wife is home with a baby). Patient endorses the pain radiates to the jaw and bilateral arms. Patient denies fever, chills, nausea, vomitting or diarrhea. He denies SOB.Denies associated fever, shortness of breath, nausea or vomiting, leg pain or swelling.  Well appearing, lying comfortably in stretcher, awake and alert, nontoxic.  VSS.  Lungs cta bl.  Cards nl S1/S2, RRR, no MRG.  Abd soft ntnd.  No pedal edema or calf tenderness.  Initial EKG with ST elevations in anterior lateral leads with reciprocal inferior depressions.  Dr. Jones notified of STEMI and patient taken urgently to the cath lab. Initial Troponin 205. Patient received , Brilinta 180, Heparin 5000 units in the ER followed by a heparin gtt.   Patient is a 42-year-old [place]yan male smoker with history of prediabetes who presented with 10/10 chest pain. Chest pain began two days prior, intermittently. Today, pain was constant in the center of his chest, started to get much worse by evening. His sister in law brought him to the hospital (as his wife is home with a baby). The pain radiates to the jaw and arms. Patient denies fever, chills, nausea, vomiting or diarrhea. He denies SOB, leg pain, or swelling. EKG showed anterior ST segment elevation with reciprocal inferior lead ST depression. Dr. Jones notified and cath lab activated for acute STEMI. Initial troponin 205 ng/L. Patient received , Brilinta 180, Heparin 5000 units in the ER followed by a heparin gtt.

## 2022-12-11 NOTE — ED ADULT NURSE NOTE - OBJECTIVE STATEMENT
43 y/o male, a&ox4, ambulatory, received to rm 13 with c/o CP. Pt reports 10/10 sharp, constant, midsternal CP, radiating to jaw and b/l elbows for the past two days, increasing while at work last night. 12 lead ECG completed, cardiac monitor applied. PMH of pre-DM and current smoker. Pt presents uncomfortable, diaphoretic, airway is patent, pt speaking in clear and coherent sentences. B/l 18G IV placed to AC, labs collected and sent off. Pt medicated as per MD orders.

## 2022-12-11 NOTE — H&P ADULT - NSHPREVIEWOFSYSTEMS_GEN_ALL_CORE
REVIEW OF SYSTEMS:    CONSTITUTIONAL: endorses pain  EYES/ENT: No visual changes;  No vertigo or throat pain   NECK: No pain or stiffness  RESPIRATORY: No cough, wheezing, hemoptysis; No shortness of breath  CARDIOVASCULAR: endorses chest pain  GASTROINTESTINAL: denies abdominal discomfort  GENITOURINARY: No dysuria, frequency or hematuria  NEUROLOGICAL: No numbness or weakness  SKIN: No itching, rashes

## 2022-12-11 NOTE — PATIENT PROFILE ADULT - FALL HARM RISK - HARM RISK INTERVENTIONS

## 2022-12-11 NOTE — CHART NOTE - NSCHARTNOTEFT_GEN_A_CORE
42 y/p Tajik male with h/o smoking and pre DM2 presents with chest pain - + AWSTEMI and now s/p JESUS to pLAD and POBA to DIAG.       NEURO:  - A & O X 3  - no active issues    RESP:  - O2 sat stable on RA    CV:  # AWSTEMI:  - ss/p JESUS to pLAD  - continue DAPT, lipitor  - start metoprolol 25 mg po BID  - check ECHO  - trend cardiac enzymes until trending down    GI:  -no active issues    /renal  -has hx of kidney stones  -no issues at this time  - trend Scr    ENDO:  # DM2  - h/o preDM but A1c is 10.1  - continue diet, FS, and IHSS at this time  - endocrine consult called, await recs    ID:  #leukocytosis  - likely in setting of acute MI  - no fever  - continue to monitor    DVT PPx:  - Heparin SQ  - OOB as tolerated      F/U:  - trend cardiac enzymes until trending down  - trend Scr  - endocrine consult called, await recs 42 y/p [place] male with h/o smoking and pre DM2 presents with chest pain - + AWSTEMI and now s/p JESUS to pLAD and POBA to DIAG.       NEURO:  - A & O X 3  - no active issues    RESP:  - O2 sat stable on RA    CV:  # AWSTEMI:  - ss/p JESUS to pLAD  - continue DAPT, lipitor  - start metoprolol 25 mg po BID  - TTE EF 30%, severe LV systolic dysfunction, normal RV   - trend cardiac enzymes until trending down    GI:  -no active issues    /renal  -has hx of kidney stones  -no issues at this time  - trend Scr    ENDO:  # DM2  - h/o preDM but A1c is 10.1  - continue diet, FS, and IHSS at this time  - endocrine consult called, await recs    ID:  #leukocytosis  - likely in setting of acute MI  - no fever  - continue to monitor    DVT PPx:  - Heparin SQ  - OOB as tolerated      F/U:  - trend cardiac enzymes until trending down  - trend Scr  - endocrine consult called, await recs 42 y/p [place]ian male with h/o smoking and pre DM2 presents with chest pain - + AWSTEMI and now s/p JESUS to pLAD and POBA to DIAG.       NEURO:  - A & O X 3  - no active issues    RESP:  - O2 sat stable on RA    CV:  # AWSTEMI:  - ss/p JESUS to pLAD  - continue DAPT, lipitor  - start metoprolol 25 mg po BID  - TTE EF 30%, severe LV systolic dysfunction, normal RV   - trend cardiac enzymes until trending down  Addendum: pleuritic chest pain today, started short course of high dose ASA and colchicine    GI:  -no active issues    /renal  -has hx of kidney stones  -no issues at this time  - trend Scr    ENDO:  # DM2  - h/o preDM but A1c is 10.1  - continue diet, FS, and IHSS at this time  - endocrine consult called, await recs    ID:  #leukocytosis  - likely in setting of acute MI  - no fever  - continue to monitor    DVT PPx:  - Heparin SQ  - OOB as tolerated      F/U:  - trend cardiac enzymes until trending down  - trend Scr  - endocrine consult called, await recs 42 y/p [place]aur[place] male with h/o smoking and pre DM2 presents with chest pain - + AWSTEMI and now s/p JESUS to pLAD and POBA to DIAG.       NEURO:  - A & O X 3  - no active issues    RESP:  - O2 sat stable on RA    CV:  # AWSTEMI:  - ss/p JESUS to pLAD  - continue DAPT, lipitor  - start metoprolol 25 mg po BID  - TTE EF 30%, severe LV systolic dysfunction, normal RV   - trend cardiac enzymes until trending down  Addendum: pleuritic chest pain today, started short course of high dose ASA and colchicine    GI:  -no active issues    /renal  -has hx of kidney stones  -no issues at this time  - trend Scr    ENDO:  # DM2  - h/o preDM but A1c is 10.1  - continue diet, FS, and IHSS at this time  - endocrine consult called, await recs    ID:  #leukocytosis  - likely in setting of acute MI  - no fever  - continue to monitor    DVT PPx:  - Heparin SQ  - OOB as tolerated      F/U:  - trend cardiac enzymes until trending down  - trend Scr  - endocrine consult called, await recs    Signed out to [facility] and [facility].

## 2022-12-11 NOTE — ED PROVIDER NOTE - PHYSICAL EXAMINATION
General: diaphoretic   HEENT: atraumatic, normocephalic; mucous membranes moist  Neck: full range of motion  Chest/Lung: clear to auscultation bilaterally, no wheezes/rhonchi/rales  Heart: mildly tachycardic   Abdomen: soft, non-tender, non-distended  Extremities: no lower extremity edema, +2 radial pulses bilaterally, +2 dorsalis pedis pulses bilaterally  Musculoskeletal: full range of motion of all 4 extremities  Nervous System: alert and oriented, CNII-XII grossly intact; no focal neurologic deficits  Skin: no rashes/lacerations noted

## 2022-12-11 NOTE — ED PROVIDER NOTE - CLINICAL SUMMARY MEDICAL DECISION MAKING FREE TEXT BOX
Patient is a 42 year-old-male with history of DM and current smoker presents with chest pain as a STEMI activation. EKG concerning for STEMI. Labs and CXR. Cath lab activation. Will load with aspirin, brilinta and heparin. Nitro for symptomatic management.

## 2022-12-11 NOTE — ED PROVIDER NOTE - ATTENDING CONTRIBUTION TO CARE
42-year-old male with history of prediabetes not on meds, active smoker here with chest pain.  Patient reports 3 hours of substernal chest pain radiating to bilateral arms and jaw.  Patient reports onset 3 hours ago while he was at work and has been constant since.  He does report a few days of on and off similar and mild pain, which with self resolved.  Denies associated fever, shortness of breath, nausea or vomiting, leg pain or swelling.  Well appearing, lying comfortably in stretcher, awake and alert, nontoxic.  VSS.  Lungs cta bl.  Cards nl S1/S2, RRR, no MRG.  Abd soft ntnd.  No pedal edema or calf tenderness.  Initial EKG with ST elevations in anterior lateral leads with reciprocal inferior depressions.  STEMI code called–spoke with interventionalists Dr. Jones, who plans to activate cath lab.  Labs, XR, tele, heparin/asa/brilinta, ccu np aware.

## 2022-12-12 DIAGNOSIS — I10 ESSENTIAL (PRIMARY) HYPERTENSION: ICD-10-CM

## 2022-12-12 DIAGNOSIS — R65.10 SYSTEMIC INFLAMMATORY RESPONSE SYNDROME (SIRS) OF NON-INFECTIOUS ORIGIN WITHOUT ACUTE ORGAN DYSFUNCTION: ICD-10-CM

## 2022-12-12 DIAGNOSIS — Z29.9 ENCOUNTER FOR PROPHYLACTIC MEASURES, UNSPECIFIED: ICD-10-CM

## 2022-12-12 DIAGNOSIS — E11.9 TYPE 2 DIABETES MELLITUS WITHOUT COMPLICATIONS: ICD-10-CM

## 2022-12-12 DIAGNOSIS — E78.5 HYPERLIPIDEMIA, UNSPECIFIED: ICD-10-CM

## 2022-12-12 DIAGNOSIS — I21.3 ST ELEVATION (STEMI) MYOCARDIAL INFARCTION OF UNSPECIFIED SITE: ICD-10-CM

## 2022-12-12 LAB
ALBUMIN SERPL ELPH-MCNC: 3.9 G/DL — SIGNIFICANT CHANGE UP (ref 3.3–5)
ALP SERPL-CCNC: 55 U/L — SIGNIFICANT CHANGE UP (ref 40–120)
ALT FLD-CCNC: 95 U/L — HIGH (ref 4–41)
ANION GAP SERPL CALC-SCNC: 12 MMOL/L — SIGNIFICANT CHANGE UP (ref 7–14)
APPEARANCE UR: CLEAR — SIGNIFICANT CHANGE UP
AST SERPL-CCNC: 202 U/L — HIGH (ref 4–40)
B PERT DNA SPEC QL NAA+PROBE: SIGNIFICANT CHANGE UP
B PERT+PARAPERT DNA PNL SPEC NAA+PROBE: SIGNIFICANT CHANGE UP
BASOPHILS # BLD AUTO: 0.12 K/UL — SIGNIFICANT CHANGE UP (ref 0–0.2)
BASOPHILS NFR BLD AUTO: 0.9 % — SIGNIFICANT CHANGE UP (ref 0–2)
BILIRUB SERPL-MCNC: 1.6 MG/DL — HIGH (ref 0.2–1.2)
BILIRUB UR-MCNC: NEGATIVE — SIGNIFICANT CHANGE UP
BORDETELLA PARAPERTUSSIS (RAPRVP): SIGNIFICANT CHANGE UP
BUN SERPL-MCNC: 14 MG/DL — SIGNIFICANT CHANGE UP (ref 7–23)
C PNEUM DNA SPEC QL NAA+PROBE: SIGNIFICANT CHANGE UP
CALCIUM SERPL-MCNC: 9.1 MG/DL — SIGNIFICANT CHANGE UP (ref 8.4–10.5)
CHLORIDE SERPL-SCNC: 99 MMOL/L — SIGNIFICANT CHANGE UP (ref 98–107)
CK MB BLD-MCNC: 2.5 % — SIGNIFICANT CHANGE UP (ref 0–2.5)
CK MB BLD-MCNC: 2.6 % — HIGH (ref 0–2.5)
CK MB BLD-MCNC: 3.2 % — HIGH (ref 0–2.5)
CK MB CFR SERPL CALC: 41.4 NG/ML — HIGH
CK MB CFR SERPL CALC: 41.7 NG/ML — HIGH
CK MB CFR SERPL CALC: 67.7 NG/ML — HIGH
CK SERPL-CCNC: 1612 U/L — HIGH (ref 30–200)
CK SERPL-CCNC: 1628 U/L — HIGH (ref 30–200)
CK SERPL-CCNC: 2146 U/L — HIGH (ref 30–200)
CO2 SERPL-SCNC: 23 MMOL/L — SIGNIFICANT CHANGE UP (ref 22–31)
COLOR SPEC: SIGNIFICANT CHANGE UP
CREAT SERPL-MCNC: 0.77 MG/DL — SIGNIFICANT CHANGE UP (ref 0.5–1.3)
DIFF PNL FLD: NEGATIVE — SIGNIFICANT CHANGE UP
EGFR: 115 ML/MIN/1.73M2 — SIGNIFICANT CHANGE UP
EOSINOPHIL # BLD AUTO: 0 K/UL — SIGNIFICANT CHANGE UP (ref 0–0.5)
EOSINOPHIL NFR BLD AUTO: 0 % — SIGNIFICANT CHANGE UP (ref 0–6)
FLUAV SUBTYP SPEC NAA+PROBE: SIGNIFICANT CHANGE UP
FLUBV RNA SPEC QL NAA+PROBE: SIGNIFICANT CHANGE UP
GIANT PLATELETS BLD QL SMEAR: PRESENT — SIGNIFICANT CHANGE UP
GLUCOSE BLDC GLUCOMTR-MCNC: 207 MG/DL — HIGH (ref 70–99)
GLUCOSE BLDC GLUCOMTR-MCNC: 227 MG/DL — HIGH (ref 70–99)
GLUCOSE BLDC GLUCOMTR-MCNC: 246 MG/DL — HIGH (ref 70–99)
GLUCOSE BLDC GLUCOMTR-MCNC: 264 MG/DL — HIGH (ref 70–99)
GLUCOSE SERPL-MCNC: 245 MG/DL — HIGH (ref 70–99)
GLUCOSE UR QL: ABNORMAL
HADV DNA SPEC QL NAA+PROBE: SIGNIFICANT CHANGE UP
HCOV 229E RNA SPEC QL NAA+PROBE: SIGNIFICANT CHANGE UP
HCOV HKU1 RNA SPEC QL NAA+PROBE: SIGNIFICANT CHANGE UP
HCOV NL63 RNA SPEC QL NAA+PROBE: SIGNIFICANT CHANGE UP
HCOV OC43 RNA SPEC QL NAA+PROBE: SIGNIFICANT CHANGE UP
HCT VFR BLD CALC: 49.1 % — SIGNIFICANT CHANGE UP (ref 39–50)
HGB BLD-MCNC: 16.3 G/DL — SIGNIFICANT CHANGE UP (ref 13–17)
HMPV RNA SPEC QL NAA+PROBE: SIGNIFICANT CHANGE UP
HPIV1 RNA SPEC QL NAA+PROBE: SIGNIFICANT CHANGE UP
HPIV2 RNA SPEC QL NAA+PROBE: SIGNIFICANT CHANGE UP
HPIV3 RNA SPEC QL NAA+PROBE: SIGNIFICANT CHANGE UP
HPIV4 RNA SPEC QL NAA+PROBE: SIGNIFICANT CHANGE UP
KETONES UR-MCNC: ABNORMAL
LEUKOCYTE ESTERASE UR-ACNC: NEGATIVE — SIGNIFICANT CHANGE UP
LYMPHOCYTES # BLD AUTO: 1.71 K/UL — SIGNIFICANT CHANGE UP (ref 1–3.3)
LYMPHOCYTES # BLD AUTO: 13 % — SIGNIFICANT CHANGE UP (ref 13–44)
M PNEUMO DNA SPEC QL NAA+PROBE: SIGNIFICANT CHANGE UP
MAGNESIUM SERPL-MCNC: 1.8 MG/DL — SIGNIFICANT CHANGE UP (ref 1.6–2.6)
MAGNESIUM SERPL-MCNC: 2.1 MG/DL — SIGNIFICANT CHANGE UP (ref 1.6–2.6)
MCHC RBC-ENTMCNC: 30.8 PG — SIGNIFICANT CHANGE UP (ref 27–34)
MCHC RBC-ENTMCNC: 33.2 GM/DL — SIGNIFICANT CHANGE UP (ref 32–36)
MCV RBC AUTO: 92.8 FL — SIGNIFICANT CHANGE UP (ref 80–100)
MONOCYTES # BLD AUTO: 1.37 K/UL — HIGH (ref 0–0.9)
MONOCYTES NFR BLD AUTO: 10.4 % — SIGNIFICANT CHANGE UP (ref 2–14)
NEUTROPHILS # BLD AUTO: 9.27 K/UL — HIGH (ref 1.8–7.4)
NEUTROPHILS NFR BLD AUTO: 69.6 % — SIGNIFICANT CHANGE UP (ref 43–77)
NEUTS BAND # BLD: 0.9 % — SIGNIFICANT CHANGE UP (ref 0–6)
NITRITE UR-MCNC: NEGATIVE — SIGNIFICANT CHANGE UP
NRBC # BLD: 0 /100 WBCS — SIGNIFICANT CHANGE UP (ref 0–0)
NRBC # FLD: 0 K/UL — SIGNIFICANT CHANGE UP (ref 0–0)
PH UR: 6.5 — SIGNIFICANT CHANGE UP (ref 5–8)
PHOSPHATE SERPL-MCNC: 2.7 MG/DL — SIGNIFICANT CHANGE UP (ref 2.5–4.5)
PHOSPHATE SERPL-MCNC: 3.1 MG/DL — SIGNIFICANT CHANGE UP (ref 2.5–4.5)
PLAT MORPH BLD: NORMAL — SIGNIFICANT CHANGE UP
PLATELET # BLD AUTO: 194 K/UL — SIGNIFICANT CHANGE UP (ref 150–400)
PLATELET COUNT - ESTIMATE: NORMAL — SIGNIFICANT CHANGE UP
POLYCHROMASIA BLD QL SMEAR: SLIGHT — SIGNIFICANT CHANGE UP
POTASSIUM SERPL-MCNC: 4.1 MMOL/L — SIGNIFICANT CHANGE UP (ref 3.5–5.3)
POTASSIUM SERPL-SCNC: 4.1 MMOL/L — SIGNIFICANT CHANGE UP (ref 3.5–5.3)
PROCALCITONIN SERPL-MCNC: 0.13 NG/ML — HIGH (ref 0.02–0.1)
PROT SERPL-MCNC: 6.7 G/DL — SIGNIFICANT CHANGE UP (ref 6–8.3)
PROT UR-MCNC: NEGATIVE — SIGNIFICANT CHANGE UP
RAPID RVP RESULT: SIGNIFICANT CHANGE UP
RBC # BLD: 5.29 M/UL — SIGNIFICANT CHANGE UP (ref 4.2–5.8)
RBC # FLD: 12.1 % — SIGNIFICANT CHANGE UP (ref 10.3–14.5)
RBC BLD AUTO: NORMAL — SIGNIFICANT CHANGE UP
RSV RNA SPEC QL NAA+PROBE: SIGNIFICANT CHANGE UP
RV+EV RNA SPEC QL NAA+PROBE: SIGNIFICANT CHANGE UP
SARS-COV-2 RNA SPEC QL NAA+PROBE: SIGNIFICANT CHANGE UP
SODIUM SERPL-SCNC: 134 MMOL/L — LOW (ref 135–145)
SP GR SPEC: 1.02 — SIGNIFICANT CHANGE UP (ref 1.01–1.05)
TROPONIN T, HIGH SENSITIVITY RESULT: 3655 NG/L — CRITICAL HIGH
TROPONIN T, HIGH SENSITIVITY RESULT: 3733 NG/L — CRITICAL HIGH
TROPONIN T, HIGH SENSITIVITY RESULT: 4065 NG/L — CRITICAL HIGH
UROBILINOGEN FLD QL: SIGNIFICANT CHANGE UP
VARIANT LYMPHS # BLD: 5.2 % — SIGNIFICANT CHANGE UP (ref 0–6)
WBC # BLD: 13.08 K/UL — HIGH (ref 3.8–10.5)
WBC # FLD AUTO: 13.08 K/UL — HIGH (ref 3.8–10.5)

## 2022-12-12 PROCEDURE — 99232 SBSQ HOSP IP/OBS MODERATE 35: CPT

## 2022-12-12 PROCEDURE — 99255 IP/OBS CONSLTJ NEW/EST HI 80: CPT | Mod: GC

## 2022-12-12 PROCEDURE — 99233 SBSQ HOSP IP/OBS HIGH 50: CPT

## 2022-12-12 PROCEDURE — 93010 ELECTROCARDIOGRAM REPORT: CPT

## 2022-12-12 RX ORDER — MAGNESIUM SULFATE 500 MG/ML
1 VIAL (ML) INJECTION ONCE
Refills: 0 | Status: COMPLETED | OUTPATIENT
Start: 2022-12-12 | End: 2022-12-12

## 2022-12-12 RX ORDER — INSULIN GLARGINE 100 [IU]/ML
18 INJECTION, SOLUTION SUBCUTANEOUS AT BEDTIME
Refills: 0 | Status: DISCONTINUED | OUTPATIENT
Start: 2022-12-12 | End: 2022-12-13

## 2022-12-12 RX ORDER — LOSARTAN POTASSIUM 100 MG/1
12.5 TABLET, FILM COATED ORAL DAILY
Refills: 0 | Status: DISCONTINUED | OUTPATIENT
Start: 2022-12-12 | End: 2022-12-15

## 2022-12-12 RX ORDER — INSULIN LISPRO 100/ML
VIAL (ML) SUBCUTANEOUS
Refills: 0 | Status: DISCONTINUED | OUTPATIENT
Start: 2022-12-12 | End: 2022-12-13

## 2022-12-12 RX ORDER — TICAGRELOR 90 MG/1
1 TABLET ORAL
Qty: 60 | Refills: 0
Start: 2022-12-12 | End: 2023-01-10

## 2022-12-12 RX ORDER — INSULIN LISPRO 100/ML
6 VIAL (ML) SUBCUTANEOUS
Refills: 0 | Status: DISCONTINUED | OUTPATIENT
Start: 2022-12-12 | End: 2022-12-13

## 2022-12-12 RX ORDER — INSULIN LISPRO 100/ML
VIAL (ML) SUBCUTANEOUS AT BEDTIME
Refills: 0 | Status: DISCONTINUED | OUTPATIENT
Start: 2022-12-12 | End: 2022-12-18

## 2022-12-12 RX ADMIN — CHLORHEXIDINE GLUCONATE 1 APPLICATION(S): 213 SOLUTION TOPICAL at 05:24

## 2022-12-12 RX ADMIN — Medication 3: at 18:27

## 2022-12-12 RX ADMIN — Medication 25 MILLIGRAM(S): at 18:27

## 2022-12-12 RX ADMIN — Medication 4: at 13:11

## 2022-12-12 RX ADMIN — Medication 0.6 MILLIGRAM(S): at 05:23

## 2022-12-12 RX ADMIN — Medication 650 MILLIGRAM(S): at 13:15

## 2022-12-12 RX ADMIN — HEPARIN SODIUM 5000 UNIT(S): 5000 INJECTION INTRAVENOUS; SUBCUTANEOUS at 22:14

## 2022-12-12 RX ADMIN — Medication 650 MILLIGRAM(S): at 22:13

## 2022-12-12 RX ADMIN — Medication 0.6 MILLIGRAM(S): at 18:27

## 2022-12-12 RX ADMIN — TICAGRELOR 90 MILLIGRAM(S): 90 TABLET ORAL at 13:15

## 2022-12-12 RX ADMIN — ATORVASTATIN CALCIUM 80 MILLIGRAM(S): 80 TABLET, FILM COATED ORAL at 22:12

## 2022-12-12 RX ADMIN — TICAGRELOR 90 MILLIGRAM(S): 90 TABLET ORAL at 00:55

## 2022-12-12 RX ADMIN — INSULIN GLARGINE 18 UNIT(S): 100 INJECTION, SOLUTION SUBCUTANEOUS at 22:18

## 2022-12-12 RX ADMIN — Medication 6 UNIT(S): at 18:26

## 2022-12-12 RX ADMIN — BENZOCAINE AND MENTHOL 1 LOZENGE: 5; 1 LIQUID ORAL at 00:55

## 2022-12-12 RX ADMIN — Medication 650 MILLIGRAM(S): at 05:23

## 2022-12-12 RX ADMIN — Medication 4: at 09:37

## 2022-12-12 RX ADMIN — Medication 25 MILLIGRAM(S): at 05:23

## 2022-12-12 RX ADMIN — Medication 100 GRAM(S): at 02:14

## 2022-12-12 RX ADMIN — HEPARIN SODIUM 5000 UNIT(S): 5000 INJECTION INTRAVENOUS; SUBCUTANEOUS at 05:23

## 2022-12-12 RX ADMIN — HEPARIN SODIUM 5000 UNIT(S): 5000 INJECTION INTRAVENOUS; SUBCUTANEOUS at 13:15

## 2022-12-12 NOTE — PROVIDER CONTACT NOTE (OTHER) - ASSESSMENT
Patient is A&O x4, febrile 100.9 orally, tachycardic as his baseline, no complaints of chest pain or discomfort at right groin cath site, other vitals stable on room air, tylenol given at 1800

## 2022-12-12 NOTE — PROGRESS NOTE ADULT - ASSESSMENT
repeat tte 12/14    42 y/p Ethiopian male with h/o smoking and pre DM2 presents with chest pain - + AWSTEMI and now s/p JESUS to pLANEHEMIAH and POBA to stefanie

## 2022-12-12 NOTE — CONSULT NOTE ADULT - ASSESSMENT
This is a 43 yo M /w a PMH of DM2 who present with chest pain and found to have a STEMI with 100% occlusion of proximal LAD c/b reduced ef /w EF 30% now s/p PCI and stenting. Endocrinology for DM2 management given persistent hyperglycemia.     #DM2 /w hyperglycemia  -please start lantus 18 units nightly  -please start admelog 6 units before meals  -please change to low admelog correction scale before meals and before bedtime  -FSG goal 100-180  -carb consistent diet  -recommend nutrition consult   -hypoglycemia protocol to be in place if needed    #D/C recommendations  -follow up at Endocrine Practice at [address] [place] [address], [address], [place] [address];  # [phone]  -start metformin 500mg BID and increase to 1000mg BID after 1 week  -start either jardiance 25mg daily or farxiga 10mg daily depending on insurance - this will provide further glycemic control and also reduce complications and hospitalizations from CHF given low EF at 30%  -start GLP1 agonist either trulicity 0.75mg weekly or ozempic 0.25mg weekly on discharge - these have additional benefit of reducing MACE  -please prescribe a new glucometer, lancet, and test strips - please ensure nurse teaches patient how to check FSG      #HTN  -ACEi VS ARB VS ARNI as per cardiology given STEMI and low EF    #HLd  -c/w atorvastatin 80mg daily    Case discussed with Dr. Noa Guzman MD  Endocrine Fellow  Can be reached via teams. For follow up questions, discharge recommendations, or new consults, please call answering service at [phone] (weekdays); [phone] (nights/weekends) This is a 43 yo M /w a PMH of DM2 who present with chest pain and found to have a STEMI with 100% occlusion of proximal LAD c/b reduced ef /w EF 30% now s/p PCI and stenting. Endocrinology for DM2 management given persistent hyperglycemia.     #DM2 /w hyperglycemia  -please start lantus 18 units nightly  -please start admelog 6 units before meals  -please change to low admelog correction scale before meals and before bedtime  -FSG goal 100-180  -carb consistent diet  -recommend nutrition consult   -hypoglycemia protocol to be in place if needed    #D/C recommendations  -follow up at Endocrine Practice at [address] [place] [address], [address], [place] [address];  # [phone]  -start metformin 500mg BID and increase to 1000mg BID after 1 week  -start either jardiance 25mg daily or farxiga 10mg daily depending on insurance - this will provide further glycemic control and also reduce complications and hospitalizations from CHF given low EF at 30%  -consider starting GLP1 agonist as outpatient   -please prescribe a new glucometer, lancet, and test strips - please ensure nurse teaches patient how to check FSG prior to discharge      #HTN  -ACEi VS ARB VS ARNI as per cardiology given STEMI and low EF    #HLd  -c/w atorvastatin 80mg daily    Case discussed with Dr. Noa Guzman MD  Endocrine Fellow  Can be reached via teams. For follow up questions, discharge recommendations, or new consults, please call answering service at [phone] (weekdays); [phone] (nights/weekends) This is a 43 yo M /w a PMH of DM2 who present with chest pain and found to have a STEMI with 100% occlusion of proximal LAD c/b reduced ef /w EF 30% now s/p PCI and stenting. Endocrinology for DM2 management given persistent hyperglycemia.     #DM2 /w hyperglycemia  -please start lantus 18 units nightly  -please start admelog 6 units before meals  -please change to low admelog correction scale before meals and before bedtime  -FSG goal 100-180  -carb consistent diet  -recommend nutrition consult   -hypoglycemia protocol to be in place if needed    #D/C recommendations  -follow up at Endocrine Practice at [address] [place] [address], [address], [place] [address]; Ph # [phone]  -start metformin 500mg BID and increase to 1000mg BID after 1 week  -start either jardiance 10mg daily or farxiga 5mg daily depending on insurance - this will provide further glycemic control and also reduce complications and hospitalizations from CHF given low EF at 30%  -consider starting GLP1 agonist as outpatient   -please prescribe a new glucometer, lancet, and test strips - please ensure nurse teaches patient how to check FSG prior to discharge      #HTN  -ACEi VS ARB VS ARNI as per cardiology given STEMI and low EF    #HLd  -c/w atorvastatin 80mg daily    Case discussed with Dr. Noa Guzman MD  Endocrine Fellow  Can be reached via teams. For follow up questions, discharge recommendations, or new consults, please call answering service at [phone] (weekdays); [phone] (nights/weekends)

## 2022-12-12 NOTE — CONSULT NOTE ADULT - ATTENDING COMMENTS
42M DM2 uncontrolled HbA1c 10.2% s/p STEMI.  Inpatient insulin regimen as outlined.  For dc metformin and SGLT2i and may add GLP1RA as outpatient.  Endocrine team consulted for uncontrolled diabetes. Patient is high risk with high level decision making due to uncontrolled diabetes which places patient at high risk for cardiovascular and cerebrovascular events. Patient with lability of glucose requiring close monitoring and insulin adjustments.    Mahad Howell MD  Division of Endocrinology  Pager: [phone]    If after 6PM or before 9AM, or on weekends/holidays, please call endocrine answering service for assistance ([phone]).  For nonurgent matters email [email or web address] for assistance.

## 2022-12-12 NOTE — PROGRESS NOTE ADULT - PROBLEM SELECTOR PLAN 1
- s/p JESUS to pLAD  - continue DAPT, atorvastatin  - continue metoprolol 25mg BID  - TTE EF 30%, severe LV systolic dysfunction, normal RV   - cardiac enzymes downtrending  - pleuritic chest pain, on high dose ASA and colchicine x 7 days for treatment of pericarditis  - start on Losartan 12.5mg Po daily  - repeat echo12/14 to r/o LV thrombus

## 2022-12-12 NOTE — PROGRESS NOTE ADULT - SUBJECTIVE AND OBJECTIVE BOX
Subjective/Objective: Patient report empty feeling in the chest when take deep breath. No chest pain or sob    Tele event:-110    MEDICATIONS    heparin   Injectable 5000 Unit(s) SubCutaneous every 8 hours  metoprolol tartrate 25 milliGRAM(s) Oral two times a day  ticagrelor 90 milliGRAM(s) Oral every 12 hours  acetaminophen     Tablet .. 650 milliGRAM(s) Oral every 6 hours PRN  aspirin 650 milliGRAM(s) Oral every 8 hours  atorvastatin 80 milliGRAM(s) Oral at bedtime  colchicine 0.6 milliGRAM(s) Oral every 12 hours  dextrose 50% Injectable 25 Gram(s) IV Push once  dextrose 50% Injectable 12.5 Gram(s) IV Push once  dextrose 50% Injectable 25 Gram(s) IV Push once  dextrose Oral Gel 15 Gram(s) Oral once PRN  glucagon  Injectable 1 milliGRAM(s) IntraMuscular once  insulin lispro (ADMELOG) corrective regimen sliding scale   SubCutaneous three times a day before meals  insulin lispro (ADMELOG) corrective regimen sliding scale   SubCutaneous at bedtime  chlorhexidine 2% Cloths 1 Application(s) Topical <User Schedule>  dextrose 5%. 1000 milliLiter(s) IV Continuous <Continuous>  dextrose 5%. 1000 milliLiter(s) IV Continuous <Continuous>  influenza   Vaccine 0.5 milliLiter(s) IntraMuscular once            REVIEW OF SYSTEMS:  Complete 10point ROS negative.    ICU Vital Signs Last 24 Hrs  T(C): 36.1 (12 Dec 2022 09:08), Max: 38.3 (11 Dec 2022 21:44)  T(F): 97 (12 Dec 2022 09:08), Max: 100.9 (11 Dec 2022 21:44)  HR: 109 (12 Dec 2022 09:08) (102 - 117)  BP: 99/70 (12 Dec 2022 09:08) (99/70 - 130/108)  BP(mean): 100 (11 Dec 2022 17:00) (72 - 115)  ABP: --  ABP(mean): --  RR: 18 (12 Dec 2022 09:08) (16 - 25)  SpO2: 100% (12 Dec 2022 09:08) (96% - 100%)    O2 Parameters below as of 12 Dec 2022 09:08  Patient On (Oxygen Delivery Method): room air            PHYSICAL EXAMINATION  Appearance: NAD, no distress  HEENT: Moist Mucous Membranes, Anicteric, PERRL, EOMI  Cardiovascular: Regular rate and rhythm, Normal S1 S2, No JVD, No murmurs  Respiratory: Lungs clear to auscultation. No rales, No rhonchi, No wheezing. No tenderness to palpation  Gastrointestinal:  Soft, Non-tender, + BS  Neurologic: Non-focal, A&Ox3  Skin: Warm and dry, No rashes, No ecchymosis, No cyanosis  Musculoskeletal: No clubbing, No cyanosis, No edema  Vascular: Peripheral pulses palpable 2+ bilaterally  Psychiatry: Mood & affect appropriate      	    		      I&O's Summary    11 Dec 2022 07:01  -  12 Dec 2022 07:00  --------------------------------------------------------  IN: 1470 mL / OUT: 3350 mL / NET: -1880 mL    	     LABS:	  LABORATORY VALUES	 	                          16.3   13.08 )-----------( 194      ( 12 Dec 2022 05:50 )             49.1           134<L>  |  99  |  14  ----------------------------<  245<H>  4.1   |  23  |  0.77      130<L>  |  97<L>  |  18  ----------------------------<  291<H>  4.5   |  23  |  0.86    Ca    9.1      12 Dec 2022 05:50  Ca    9.2      11 Dec 2022 22:52  Phos  2.7     -  Phos  3.1     -  Mg     2.10     12-  Mg     1.80     -    TPro  6.7  /  Alb  3.9  /  TBili  1.6<H>  /  DBili  x   /  AST  202<H>  /  ALT  95<H>  /  AlkPhos  55    TPro  6.4  /  Alb  3.8  /  TBili  1.5<H>  /  DBili  x   /  AST  264<H>  /  ALT  100<H>  /  AlkPhos  53  12    LIVER FUNCTIONS - ( 12 Dec 2022 05:50 )  Alb: 3.9 g/dL / Pro: 6.7 g/dL / ALK PHOS: 55 U/L / ALT: 95 U/L / AST: 202 U/L / GGT: x           Activated Partial Thromboplastin Time: 26.6 sec ( @ 22:52)      CARDIAC MARKERS:  Creatine Kinase, Serum: 1628 U/L ( @ 06:29)  Creatine Kinase, Serum: 1612 U/L ( @ 05:50)  Creatine Kinase, Serum: 2146 U/L ( @ 22:52)          Serum Pro-Brain Natriuretic Peptide: 562 pg/mL ( @ 01:20)    Lactate, Blood: 1.7 mmol/L ( @ 22:52)  Blood Gas Venous - Lactate: 2.6 mmol/L ( @ 01:20)     @ 04:27  Cholesterol, Serum - 192  Direct LDL- --  HDL Cholesterol, Serum- 60  Triglycerides, Serum- 53      Thyroid Stimulating Hormone, Serum: 0.60 uIU/mL ( @ 04:27)      Urinalysis Basic - ( 12 Dec 2022 01:10 )    Color: Light Yellow / Appearance: Clear / S.025 / pH: x  Gluc: x / Ketone: Small  / Bili: Negative / Urobili: <2 mg/dL   Blood: x / Protein: Negative / Nitrite: Negative   Leuk Esterase: Negative / RBC: x / WBC x   Sq Epi: x / Non Sq Epi: x / Bacteria: x      CAPILLARY BLOOD GLUCOSE      POCT Blood Glucose.: 227 mg/dL (12 Dec 2022 09:19)       @ 02:02  229E Corona Virus --  Adenovirus NotDetec  Bordetella pertussis NotDetec  Chlamydia pneumoniae NotDetec  Entero/Rhino Virus NotDetec  HKU1 Coronavirus --  hMPV NotDetec  Influenza A NotDetec  Influenza AH1 --  Influenza AH1 2009 --  Influenza AH3 --  Influenza B NotDetec  Mycoplasma pneumoniae NotDetec  NL63 Coronavirus --  OC43 Corornavirus --  Parainfluenza 1 NotDetec  Parainfluenza 2 NotDetec    CARDIAC MARKERS ( 12 Dec 2022 06:29 )  x     / x     / 1628 U/L / x     / 41.4 ng/mL  CARDIAC MARKERS ( 12 Dec 2022 05:50 )  x     / x     / 1612 U/L / x     / 41.7 ng/mL  CARDIAC MARKERS ( 11 Dec 2022 22:52 )  x     / x     / 2146 U/L / x     / 67.7 ng/mL  CARDIAC MARKERS ( 11 Dec 2022 12:23 )  x     / x     / 4751 U/L / x     / 266.3 ng/mL  CARDIAC MARKERS ( 11 Dec 2022 04:27 )  x     / x     / 6814 U/L / x     / 345.1 ng/mL              EKG:  Diagnostic testing:  cath:  echo:      Assessment and Plan:         Subjective/Objective: Patient report empty feeling in the chest when take deep breath. No chest pain or sob    Tele event:-110    MEDICATIONS    heparin   Injectable 5000 Unit(s) SubCutaneous every 8 hours  metoprolol tartrate 25 milliGRAM(s) Oral two times a day  ticagrelor 90 milliGRAM(s) Oral every 12 hours  acetaminophen     Tablet .. 650 milliGRAM(s) Oral every 6 hours PRN  aspirin 650 milliGRAM(s) Oral every 8 hours  atorvastatin 80 milliGRAM(s) Oral at bedtime  colchicine 0.6 milliGRAM(s) Oral every 12 hours  dextrose 50% Injectable 25 Gram(s) IV Push once  dextrose 50% Injectable 12.5 Gram(s) IV Push once  dextrose 50% Injectable 25 Gram(s) IV Push once  dextrose Oral Gel 15 Gram(s) Oral once PRN  glucagon  Injectable 1 milliGRAM(s) IntraMuscular once  insulin lispro (ADMELOG) corrective regimen sliding scale   SubCutaneous three times a day before meals  insulin lispro (ADMELOG) corrective regimen sliding scale   SubCutaneous at bedtime  chlorhexidine 2% Cloths 1 Application(s) Topical <User Schedule>  dextrose 5%. 1000 milliLiter(s) IV Continuous <Continuous>  dextrose 5%. 1000 milliLiter(s) IV Continuous <Continuous>  influenza   Vaccine 0.5 milliLiter(s) IntraMuscular once          REVIEW OF SYSTEMS    General: no fatigue/malaise, weight loss/gain.  Skin: no rashes.  Ophthalmologic: no blurred vision, no loss of vision. 	  ENT: no sore throat, rhinorrhea, sinus congestion.  Cardiovascular:+ chest discomfort on inspiration ,no palpitation, no dizziness, o diaphoresis, no edema  Respiratory: no SOB, cough or wheeze.  Gastrointestinal:  no N/V/D, no melena/hematemesis/hematochezia.  Genitourinary: no dysuria/hesitancy or hematuria.  Musculoskeletal: no myalgias or arthralgias.  Neurological: no changes in vision or hearing, no lightheadedness/dizziness, no syncope/near syncope	  Psychiatric: no unusual stress/anxiety      	    ICU Vital Signs Last 24 Hrs  T(C): 36.1 (12 Dec 2022 09:08), Max: 38.3 (11 Dec 2022 21:44)  T(F): 97 (12 Dec 2022 09:08), Max: 100.9 (11 Dec 2022 21:44)  HR: 109 (12 Dec 2022 09:08) (102 - 117)  BP: 99/70 (12 Dec 2022 09:08) (99/70 - 130/108)  BP(mean): 100 (11 Dec 2022 17:00) (72 - 115)  RR: 18 (12 Dec 2022 09:08) (16 - 25)  SpO2: 100% (12 Dec 2022 09:08) (96% - 100%)    O2 Parameters below as of 12 Dec 2022 09:08  Patient On (Oxygen Delivery Method): room air            PHYSICAL EXAMINATION  Appearance: NAD, no distress  HEENT: Moist Mucous Membranes, Anicteric, PERRL, EOMI  Cardiovascular: Regular rate and rhythm, Normal S1 S2, No JVD, No murmurs  Respiratory: Lungs clear to auscultation. No rales, No rhonchi, No wheezing. No tenderness to palpation  Gastrointestinal:  Soft, Non-tender, + BS  Neurologic: Non-focal, A&Ox3  Skin: Warm and dry, No rashes, No ecchymosis, No cyanosis  Musculoskeletal: No clubbing, No cyanosis, No edema  Vascular: Peripheral pulses palpable 2+ bilaterally  Psychiatry: Mood & affect appropriate      	    		      I&O's Summary    11 Dec 2022 07:01  -  12 Dec 2022 07:00  --------------------------------------------------------  IN: 1470 mL / OUT: 3350 mL / NET: -1880 mL    	     LABS:	  LABORATORY VALUES	 	                          16.3   13.08 )-----------( 194      ( 12 Dec 2022 05:50 )             49.1           134<L>  |  99  |  14  ----------------------------<  245<H>  4.1   |  23  |  0.77      130<L>  |  97<L>  |  18  ----------------------------<  291<H>  4.5   |  23  |  0.86    Ca    9.1      12 Dec 2022 05:50  Ca    9.2      11 Dec 2022 22:52  Phos  2.7       Phos  3.1       Mg     2.10       Mg     1.80         TPro  6.7  /  Alb  3.9  /  TBili  1.6<H>  /  DBili  x   /  AST  202<H>  /  ALT  95<H>  /  AlkPhos  55    TPro  6.4  /  Alb  3.8  /  TBili  1.5<H>  /  DBili  x   /  AST  264<H>  /  ALT  100<H>  /  AlkPhos  53      LIVER FUNCTIONS - ( 12 Dec 2022 05:50 )  Alb: 3.9 g/dL / Pro: 6.7 g/dL / ALK PHOS: 55 U/L / ALT: 95 U/L / AST: 202 U/L / GGT: x           Activated Partial Thromboplastin Time: 26.6 sec ( @ 22:52)      CARDIAC MARKERS:  Creatine Kinase, Serum: 1628 U/L ( @ 06:29)  Creatine Kinase, Serum: 1612 U/L ( @ 05:50)  Creatine Kinase, Serum: 2146 U/L ( @ 22:52)          Serum Pro-Brain Natriuretic Peptide: 562 pg/mL ( @ 01:20)    Lactate, Blood: 1.7 mmol/L ( @ 22:52)  Blood Gas Venous - Lactate: 2.6 mmol/L ( @ 01:20)     @ 04:27  Cholesterol, Serum - 192  Direct LDL- --  HDL Cholesterol, Serum- 60  Triglycerides, Serum- 53      Thyroid Stimulating Hormone, Serum: 0.60 uIU/mL ( @ 04:27)      Urinalysis Basic - ( 12 Dec 2022 01:10 )    Color: Light Yellow / Appearance: Clear / S.025 / pH: x  Gluc: x / Ketone: Small  / Bili: Negative / Urobili: <2 mg/dL   Blood: x / Protein: Negative / Nitrite: Negative   Leuk Esterase: Negative / RBC: x / WBC x   Sq Epi: x / Non Sq Epi: x / Bacteria: x      CAPILLARY BLOOD GLUCOSE      POCT Blood Glucose.: 227 mg/dL (12 Dec 2022 09:19)      12-12 @ 02:02  229E Corona Virus --  Adenovirus [facility]  Bordetella pertussis [facility]  Chlamydia pneumoniae [facility]  Entero/Rhino Virus [facility]  HKU1 Coronavirus --  hMPV [facility]  Influenza A [facility]  Influenza AH1 --  Influenza AH1 2009 --  Influenza AH3 --  Influenza B [facility]  Mycoplasma pneumoniae [facility]  NL63 Coronavirus --  OC43 Corornavirus --  Parainfluenza 1 [facility]  Parainfluenza 2 [facility]    CARDIAC MARKERS ( 12 Dec 2022 06:29 )  x     / x     / 1628 U/L / x     / 41.4 ng/mL  CARDIAC MARKERS ( 12 Dec 2022 05:50 )  x     / x     / 1612 U/L / x     / 41.7 ng/mL  CARDIAC MARKERS ( 11 Dec 2022 22:52 )  x     / x     / 2146 U/L / x     / 67.7 ng/mL  CARDIAC MARKERS ( 11 Dec 2022 12:23 )  x     / x     / 4751 U/L / x     / 266.3 ng/mL  CARDIAC MARKERS ( 11 Dec 2022 04:27 )  x     / x     / 6814 U/L / x     / 345.1 ng/mL        Diagnostic testing:  cath:  echo:< from: TTE with Doppler (w/Cont) (22 @ 08:31) >  Dimensions:     Normal Values:  LA:     2.7 cm    2.0 - 4.0 cm  Ao:     3.4 cm    2.0 - 3.8 cm  SEPTUM: 0.9 cm 0.6 - 1.2 cm  PWT:    0.9 cm    0.6 - 1.1 cm  LVIDd:  4.4 cm    3.0 - 5.6 cm  LVIDs:    ---     1.8 - 4.0 cm  Derived Variables:  LVMI: 69 g/m2  RWT: 0.40  Ejection Fraction (Modified Kitchen Rule): 30 %  ------------------------------------------------------------------------  OBSERVATIONS:  Mitral Valve: Normal mitral valve.  Aortic Root: Normal aortic root.  Aortic Valve: Normal trileaflet aortic valve.  Left Atrium: Normal left atrium.  LA volume index = 19  cc/m2.  Left Ventricle: Endocardial visualization enhanced with  intravenous injection of echo contrast (Definity). Mid to  distal anterior, mid to distal anterolateral, mid to distal  septal, distal inferior and apical hypokinesis. Severe  segmental left ventricular systolic dysfunction. Normal  left ventricular internal dimensions and wall thicknesses.  Right Heart: Normal right atrium. Normal right ventricular  size. Distal right ventricle hypokinesis. Normal tricuspid  valve. Normal pulmonic valve.  Pericardium/PleuraNormal pericardium with no pericardial  effusion.    < end of copied text >  < from: Cardiac Catheterization (22 @ 01:56) >  Coronary Angiography :The coronary circulation is right dominant.      LM     Left main artery: Angiography shows minor irregularities.      LAD   Proximal left anterior descending: A large thrombosis is present.There is a 100 % stenosis. First diagonal: There is a 100 %stenosis.      CX   Proximal circumflex: There is a 50 % stenosis.      RCA   Proximal right coronary artery: There is a 60 % stenosis. Mid rightcoronary artery: There is a 50 % stenosis.    Left Heart Cath   The anterolateral and apical wall segments are akinetic. Ejectionfraction is 25%.    Interventional Findings:   Interventional Details   Proximal left anterior descending: This was a 100 % thromboticstenosis. Guidewire crossing was successful.    Conclusions: Late presentation anterior wall MI - STEMI. EF 25%. Culprit lesion inthe pLAD and large diagonal. - s/p POBA to the diagonaland stenting of the prox LAD.    Recommendations:  DAPT for 12 months.            Assessment and Plan:42 y/p Russian male with h/o smoking and pre DM2 presents with chest pain - + AWSTEMI and now s/p JESUS to pLAD and POBA to DIAG.     CV:  # AWSTEMI:  - s/p JESUS to pLAD  - continue DAPT, lipitor  - start metoprolol 25 mg po BID  - TTE EF 30%, severe LV systolic dysfunction, normal RV   - trend cardiac enzymes until trending down  - pleuritic chest pain, on high dose ASA and colchicine x 7 days  -start on Losartan 12.5mg Po daily  - repeat echo in 72 hr to r/o LV thrombus    ENDO:  # DM2  - h/o preDM but A1c is 10.1  - continue diet, FS, and IHSS at this time  - endocrine consulted, pending recs

## 2022-12-12 NOTE — CONSULT NOTE ADULT - SUBJECTIVE AND OBJECTIVE BOX
HPI:   Patient is a 42-year-old Russian male smoker with history of prediabetes who presented with 10/10 chest pain. Chest pain began two days prior, intermittently. Today, pain was constant in the center of his chest, started to get much worse by evening. His sister in law brought him to the hospital (as his wife is home with a baby). The pain radiates to the jaw and arms. Patient denies fever, chills, nausea, vomiting or diarrhea. He denies SOB, leg pain, or swelling. EKG showed anterior ST segment elevation with reciprocal inferior lead ST depression. Dr. Jones notified and cath lab activated for acute STEMI. Initial troponin 205 ng/L. Patient received , Brilinta 180, Heparin 5000 units in the ER followed by a heparin gtt.  (11 Dec 2022 02:08)      Consulted for: DM2 management    This is a 43 yo M /w a PMH of DM2 who present with chest pain and found to have a STEMI with 100% occlusion of proximal LAD c/b reduced ef /w EF 30% now s/p PCI and stenting. Endocrinology for DM2 management given persistent hyperglycemia    Patient reports DM2 for the last 3 years. He was started on "some pills". He cant recall their names but chart reports he was taking janumet. Patient report she stopped taking his medications over a year ago because he was feeling fine. Previously managed by his PCP but he has not followed up recently. Patient admits to polydipsia, worsening vision, and tingling/numbness in fingers and toes. Denies polyuria or nocturia    In the hospital patient's hyperglycemia managed only with moderate correction admelog scale.    FSG over last 24 hours  yesterday at breakfast  - received 4 units admelog  Yesterday before lunch  - received 6 units admelog  Yesterday before dinner  - received 4 units  Yesterday at bedtime  - patient did not get coverage or lantus  FSG this morning 227 and he received 4 units admelog  before breakfast  FSG before lunch 246 and he received 4 units admelog    PAST MEDICAL & SURGICAL HISTORY:  Kidney stones  right      Varicocele present on ultrasound of scrotum      Controlled type 2 diabetes mellitus without complication, unspecified long term insulin use status  Pt reports newly diagnosed 1 month ago with DM 2, started on insulin and states FS are less than 100 in AM      No significant past surgical history          FAMILY HISTORY: mother has DM2      Social History: denies ETOH use. Smokes cigarettes    Home Medications:  Janumet 50 mg-500 mg oral tablet: 1 tab(s) orally once a day (27 Jul 2017 11:54)      MEDICATIONS  (STANDING):  aspirin 650 milliGRAM(s) Oral every 8 hours  atorvastatin 80 milliGRAM(s) Oral at bedtime  chlorhexidine 2% Cloths 1 Application(s) Topical <User Schedule>  colchicine 0.6 milliGRAM(s) Oral every 12 hours  dextrose 5%. 1000 milliLiter(s) (100 mL/Hr) IV Continuous <Continuous>  dextrose 5%. 1000 milliLiter(s) (50 mL/Hr) IV Continuous <Continuous>  dextrose 50% Injectable 25 Gram(s) IV Push once  dextrose 50% Injectable 12.5 Gram(s) IV Push once  dextrose 50% Injectable 25 Gram(s) IV Push once  glucagon  Injectable 1 milliGRAM(s) IntraMuscular once  heparin   Injectable 5000 Unit(s) SubCutaneous every 8 hours  influenza   Vaccine 0.5 milliLiter(s) IntraMuscular once  insulin lispro (ADMELOG) corrective regimen sliding scale   SubCutaneous three times a day before meals  insulin lispro (ADMELOG) corrective regimen sliding scale   SubCutaneous at bedtime  metoprolol tartrate 25 milliGRAM(s) Oral two times a day  ticagrelor 90 milliGRAM(s) Oral every 12 hours    MEDICATIONS  (PRN):  acetaminophen     Tablet .. 650 milliGRAM(s) Oral every 6 hours PRN Mild Pain (1 - 3), Moderate Pain (4 - 6)  dextrose Oral Gel 15 Gram(s) Oral once PRN Blood Glucose LESS THAN 70 milliGRAM(s)/deciliter      Allergies    No Known Allergies    Intolerances      Review of Systems:  Constitutional: No fever  Eyes: No blurry vision  Neuro: No tremors  HEENT: No pain  Cardiovascular: No chest pain, palpitations  Respiratory: No SOB, no cough  GI: No nausea, vomiting, abdominal pain  : No dysuria  Skin: no rash  Psych: no depression  Endocrine: no polyuria, polydipsia  Hem/lymph: no swelling  Osteoporosis: no fractures    PHYSICAL EXAM:  VITALS: T(C): 37.2 (12-12-22 @ 11:36)  T(F): 99 (12-12-22 @ 11:36), Max: 100.9 (12-11-22 @ 21:44)  HR: 113 (12-12-22 @ 11:36) (105 - 117)  BP: 105/74 (12-12-22 @ 11:36) (99/70 - 119/85)  RR:  (16 - 21)  SpO2:  (97% - 100%)  Wt(kg): --  GENERAL: NAD  EYES: No proptosis, no lid lag, anicteric  THYROID: Normal size, no palpable nodules  RESPIRATORY: Clear to auscultation bilaterally  CARDIOVASCULAR: Regular rate and rhythm  GI: Soft, nontender, non distended  EXT: b/l feet without wounds; 2+ pulses  PSYCH: Alert and oriented x 3, reactive mood    POCT Blood Glucose.: 246 mg/dL (12-12-22 @ 12:48)  POCT Blood Glucose.: 227 mg/dL (12-12-22 @ 09:19)  POCT Blood Glucose.: 241 mg/dL (12-11-22 @ 21:09)  POCT Blood Glucose.: 302 mg/dL (12-11-22 @ 18:45)  POCT Blood Glucose.: 241 mg/dL (12-11-22 @ 16:47)  POCT Blood Glucose.: 252 mg/dL (12-11-22 @ 11:20)  POCT Blood Glucose.: 228 mg/dL (12-11-22 @ 08:24)  POCT Blood Glucose.: 326 mg/dL (12-11-22 @ 03:23)                            16.3   13.08 )-----------( 194      ( 12 Dec 2022 05:50 )             49.1       12-12    134<L>  |  99  |  14  ----------------------------<  245<H>  4.1   |  23  |  0.77    eGFR: 115    Ca    9.1      12-12  Mg     2.10     12-12  Phos  2.7     12-12    TPro  6.7  /  Alb  3.9  /  TBili  1.6<H>  /  DBili  x   /  AST  202<H>  /  ALT  95<H>  /  AlkPhos  55  12-12      Thyroid Function Tests:  12-11 @ 04:27 TSH 0.60 FreeT4 -- T3 -- Anti TPO -- Anti Thyroglobulin Ab -- TSI --  12-11 @ 01:20 TSH 1.16 FreeT4 -- T3 -- Anti TPO -- Anti Thyroglobulin Ab -- TSI --      A1C with Estimated Average Glucose Result: 10.1 % (12-11-22 @ 04:27)  A1C with Estimated Average Glucose Result: 10.2 % (12-11-22 @ 01:20)      12-11 Chol 192 Direct LDL -- LDL calculated 121<H> HDL 60 Trig 53, 12-11 Chol 200<H> Direct LDL -- LDL calculated 123<H> HDL 60 Trig 85    Radiology:

## 2022-12-12 NOTE — PROVIDER CONTACT NOTE (OTHER) - BACKGROUND
Patient is A&Ox4, admitted for STEMI myocardial infarction, s/p 1 stent placed on 12/11, hx of smoking, uncontrolled DM2, kidney stones, STEMI

## 2022-12-12 NOTE — CHART NOTE - NSCHARTNOTEFT_GEN_A_CORE
Notified pt about Brilinta co pay of $ 25/month for 1 year. Patient request to switch to Plavix. Will change to plavix upon discharge

## 2022-12-12 NOTE — CHART NOTE - NSCHARTNOTEFT_GEN_A_CORE
Routine daily EKG done today for patient; he denies any chest pain or cardiac complaints and told me he is feeling better.   EKG reviewed: sinus tach at 104bpm, TORRIE V1-V4 (unchanged from 12/11pm EKG); overall less prominent then initial EKGs. No acute changes when compared to prior.   Troponins are trending down.   EKG reviewed by CCU NP as well, no change in management at this time.   Patient currently stable, will continue to monitor.

## 2022-12-12 NOTE — PROGRESS NOTE ADULT - SUBJECTIVE AND OBJECTIVE BOX
[facility] Division of Hospital Medicine  Ameena Salguero MD  Available via MS Teams    SUBJECTIVE / OVERNIGHT EVENTS: Pt complaining of throat pain overnight. Febrile to 100.9. Pain improved with tylenol. Pt denies pain occurring prior to admission. This AM denies chest pain, abd pain, shortness of breath, N/V/D    ADDITIONAL REVIEW OF SYSTEMS: Denies    MEDICATIONS  (STANDING):  aspirin 650 milliGRAM(s) Oral every 8 hours  atorvastatin 80 milliGRAM(s) Oral at bedtime  chlorhexidine 2% Cloths 1 Application(s) Topical <User Schedule>  colchicine 0.6 milliGRAM(s) Oral every 12 hours  dextrose 5%. 1000 milliLiter(s) (100 mL/Hr) IV Continuous <Continuous>  dextrose 5%. 1000 milliLiter(s) (50 mL/Hr) IV Continuous <Continuous>  dextrose 50% Injectable 25 Gram(s) IV Push once  dextrose 50% Injectable 12.5 Gram(s) IV Push once  dextrose 50% Injectable 25 Gram(s) IV Push once  glucagon  Injectable 1 milliGRAM(s) IntraMuscular once  heparin   Injectable 5000 Unit(s) SubCutaneous every 8 hours  influenza   Vaccine 0.5 milliLiter(s) IntraMuscular once  insulin glargine Injectable (LANTUS) 18 Unit(s) SubCutaneous at bedtime  insulin lispro Injectable (ADMELOG) 6 Unit(s) SubCutaneous three times a day before meals  metoprolol tartrate 25 milliGRAM(s) Oral two times a day  ticagrelor 90 milliGRAM(s) Oral every 12 hours    MEDICATIONS  (PRN):  acetaminophen     Tablet .. 650 milliGRAM(s) Oral every 6 hours PRN Mild Pain (1 - 3), Moderate Pain (4 - 6)  dextrose Oral Gel 15 Gram(s) Oral once PRN Blood Glucose LESS THAN 70 milliGRAM(s)/deciliter      I&O's Summary    11 Dec 2022 07:01  -  12 Dec 2022 07:00  --------------------------------------------------------  IN: 1470 mL / OUT: 3350 mL / NET: -1880 mL        PHYSICAL EXAM:  Vital Signs Last 24 Hrs  T(C): 36.8 (12 Dec 2022 16:24), Max: 38.3 (11 Dec 2022 21:44)  T(F): 98.3 (12 Dec 2022 16:24), Max: 100.9 (11 Dec 2022 21:44)  HR: 108 (12 Dec 2022 16:24) (108 - 117)  BP: 103/76 (12 Dec 2022 16:24) (99/70 - 119/85)  BP(mean): --  RR: 18 (12 Dec 2022 16:24) (16 - 18)  SpO2: 98% (12 Dec 2022 16:24) (98% - 100%)    Parameters below as of 12 Dec 2022 16:24  Patient On (Oxygen Delivery Method): room air      CONSTITUTIONAL: NAD, well-developed, well-groomed  EYES: PERRLA; conjunctiva and sclera clear  ENMT: Moist oral mucosa, no pharyngeal injection or exudates; normal dentition  NECK: Supple, no palpable masses; no thyromegaly  RESPIRATORY: Normal respiratory effort; lungs are clear to auscultation bilaterally  CARDIOVASCULAR: Regular rate and rhythm, normal S1 and S2, no murmur/rub/gallop; No lower extremity edema; Peripheral pulses are 2+ bilaterally  ABDOMEN: Nontender to palpation, normoactive bowel sounds, no rebound/guarding; No hepatosplenomegaly  MUSCULOSKELETAL:  Normal gait; no clubbing or cyanosis of digits; no joint swelling or tenderness to palpation  PSYCH: A+O to person, place, and time; affect appropriate  NEUROLOGY: CN 2-12 are intact and symmetric; no gross sensory deficits   SKIN: No rashes; no palpable lesions    LABS:                        16.3   13.08 )-----------( 194      ( 12 Dec 2022 05:50 )             49.1     Hb Trend: 16.3<--, 16.0<--, 16.4<--, 17.0<--, 17.2<--  12-12    134<L>  |  99  |  14  ----------------------------<  245<H>  4.1   |  23  |  0.77    Ca    9.1      12 Dec 2022 05:50  Phos  2.7     -12  Mg     2.10         TPro  6.7  /  Alb  3.9  /  TBili  1.6<H>  /  DBili  x   /  AST  202<H>  /  ALT  95<H>  /  AlkPhos  55  12-12    Creatinine Trend: 0.77<--, 0.86<--, 0.62<--, 0.81<--  PT/INR - ( 11 Dec 2022 22:52 )   PT: 13.7 sec;   INR: 1.18 ratio         PTT - ( 11 Dec 2022 22:52 )  PTT:26.6 sec  CARDIAC MARKERS ( 12 Dec 2022 06:29 )  x     / x     / 1628 U/L / x     / 41.4 ng/mL  CARDIAC MARKERS ( 12 Dec 2022 05:50 )  x     / x     / 1612 U/L / x     / 41.7 ng/mL  CARDIAC MARKERS ( 11 Dec 2022 22:52 )  x     / x     / 2146 U/L / x     / 67.7 ng/mL  CARDIAC MARKERS ( 11 Dec 2022 12:23 )  x     / x     / 4751 U/L / x     / 266.3 ng/mL  CARDIAC MARKERS ( 11 Dec 2022 04:27 )  x     / x     / 6814 U/L / x     / 345.1 ng/mL      Urinalysis Basic - ( 12 Dec 2022 01:10 )    Color: Light Yellow / Appearance: Clear / S.025 / pH: x  Gluc: x / Ketone: Small  / Bili: Negative / Urobili: <2 mg/dL   Blood: x / Protein: Negative / Nitrite: Negative   Leuk Esterase: Negative / RBC: x / WBC x   Sq Epi: x / Non Sq Epi: x / Bacteria: x        SARS-CoV-2: NotDetec (12 Dec 2022 02:02)      RADIOLOGY & ADDITIONAL TESTS:  Tele: Sinus tachy, 100s    COORDINATION OF CARE:  Consultant Communication and Details of Discussion (where applicable): Cardiology- pt will need repeat TTE on ; d/c with plavix

## 2022-12-13 ENCOUNTER — TRANSCRIPTION ENCOUNTER (OUTPATIENT)
Age: 42
End: 2022-12-13

## 2022-12-13 LAB
ALBUMIN SERPL ELPH-MCNC: 3.7 G/DL — SIGNIFICANT CHANGE UP (ref 3.3–5)
ALP SERPL-CCNC: 52 U/L — SIGNIFICANT CHANGE UP (ref 40–120)
ALT FLD-CCNC: 63 U/L — HIGH (ref 4–41)
ANION GAP SERPL CALC-SCNC: 10 MMOL/L — SIGNIFICANT CHANGE UP (ref 7–14)
APTT BLD: 33.2 SEC — SIGNIFICANT CHANGE UP (ref 27–36.3)
AST SERPL-CCNC: 79 U/L — HIGH (ref 4–40)
BILIRUB DIRECT SERPL-MCNC: <0.2 MG/DL — SIGNIFICANT CHANGE UP (ref 0–0.3)
BILIRUB INDIRECT FLD-MCNC: >0.8 MG/DL — SIGNIFICANT CHANGE UP (ref 0–1)
BILIRUB SERPL-MCNC: 1 MG/DL — SIGNIFICANT CHANGE UP (ref 0.2–1.2)
BUN SERPL-MCNC: 13 MG/DL — SIGNIFICANT CHANGE UP (ref 7–23)
CALCIUM SERPL-MCNC: 9.4 MG/DL — SIGNIFICANT CHANGE UP (ref 8.4–10.5)
CHLORIDE SERPL-SCNC: 98 MMOL/L — SIGNIFICANT CHANGE UP (ref 98–107)
CO2 SERPL-SCNC: 24 MMOL/L — SIGNIFICANT CHANGE UP (ref 22–31)
CREAT SERPL-MCNC: 0.71 MG/DL — SIGNIFICANT CHANGE UP (ref 0.5–1.3)
EGFR: 118 ML/MIN/1.73M2 — SIGNIFICANT CHANGE UP
GLUCOSE BLDC GLUCOMTR-MCNC: 116 MG/DL — HIGH (ref 70–99)
GLUCOSE BLDC GLUCOMTR-MCNC: 121 MG/DL — HIGH (ref 70–99)
GLUCOSE BLDC GLUCOMTR-MCNC: 204 MG/DL — HIGH (ref 70–99)
GLUCOSE BLDC GLUCOMTR-MCNC: 241 MG/DL — HIGH (ref 70–99)
GLUCOSE SERPL-MCNC: 303 MG/DL — HIGH (ref 70–99)
HCT VFR BLD CALC: 45.4 % — SIGNIFICANT CHANGE UP (ref 39–50)
HGB BLD-MCNC: 14.9 G/DL — SIGNIFICANT CHANGE UP (ref 13–17)
INR BLD: 1.08 RATIO — SIGNIFICANT CHANGE UP (ref 0.88–1.16)
MAGNESIUM SERPL-MCNC: 1.8 MG/DL — SIGNIFICANT CHANGE UP (ref 1.6–2.6)
MCHC RBC-ENTMCNC: 31 PG — SIGNIFICANT CHANGE UP (ref 27–34)
MCHC RBC-ENTMCNC: 32.8 GM/DL — SIGNIFICANT CHANGE UP (ref 32–36)
MCV RBC AUTO: 94.6 FL — SIGNIFICANT CHANGE UP (ref 80–100)
NRBC # BLD: 0 /100 WBCS — SIGNIFICANT CHANGE UP (ref 0–0)
NRBC # FLD: 0 K/UL — SIGNIFICANT CHANGE UP (ref 0–0)
PHOSPHATE SERPL-MCNC: 3 MG/DL — SIGNIFICANT CHANGE UP (ref 2.5–4.5)
PLATELET # BLD AUTO: 174 K/UL — SIGNIFICANT CHANGE UP (ref 150–400)
POTASSIUM SERPL-MCNC: 4.4 MMOL/L — SIGNIFICANT CHANGE UP (ref 3.5–5.3)
POTASSIUM SERPL-SCNC: 4.4 MMOL/L — SIGNIFICANT CHANGE UP (ref 3.5–5.3)
PROT SERPL-MCNC: 6.5 G/DL — SIGNIFICANT CHANGE UP (ref 6–8.3)
PROTHROM AB SERPL-ACNC: 12.5 SEC — SIGNIFICANT CHANGE UP (ref 10.5–13.4)
RBC # BLD: 4.8 M/UL — SIGNIFICANT CHANGE UP (ref 4.2–5.8)
RBC # FLD: 12.1 % — SIGNIFICANT CHANGE UP (ref 10.3–14.5)
SODIUM SERPL-SCNC: 132 MMOL/L — LOW (ref 135–145)
WBC # BLD: 10.72 K/UL — HIGH (ref 3.8–10.5)
WBC # FLD AUTO: 10.72 K/UL — HIGH (ref 3.8–10.5)

## 2022-12-13 PROCEDURE — 99232 SBSQ HOSP IP/OBS MODERATE 35: CPT

## 2022-12-13 PROCEDURE — 93306 TTE W/DOPPLER COMPLETE: CPT | Mod: 26

## 2022-12-13 PROCEDURE — 93010 ELECTROCARDIOGRAM REPORT: CPT

## 2022-12-13 PROCEDURE — 99233 SBSQ HOSP IP/OBS HIGH 50: CPT

## 2022-12-13 RX ORDER — ASPIRIN/CALCIUM CARB/MAGNESIUM 324 MG
81 TABLET ORAL DAILY
Refills: 0 | Status: DISCONTINUED | OUTPATIENT
Start: 2022-12-14 | End: 2022-12-18

## 2022-12-13 RX ORDER — INSULIN GLARGINE 100 [IU]/ML
22 INJECTION, SOLUTION SUBCUTANEOUS AT BEDTIME
Refills: 0 | Status: DISCONTINUED | OUTPATIENT
Start: 2022-12-13 | End: 2022-12-14

## 2022-12-13 RX ORDER — HEPARIN SODIUM 5000 [USP'U]/ML
INJECTION INTRAVENOUS; SUBCUTANEOUS
Qty: 25000 | Refills: 0 | Status: DISCONTINUED | OUTPATIENT
Start: 2022-12-13 | End: 2022-12-18

## 2022-12-13 RX ORDER — INSULIN LISPRO 100/ML
7 VIAL (ML) SUBCUTANEOUS
Refills: 0 | Status: DISCONTINUED | OUTPATIENT
Start: 2022-12-13 | End: 2022-12-14

## 2022-12-13 RX ORDER — HEPARIN SODIUM 5000 [USP'U]/ML
6000 INJECTION INTRAVENOUS; SUBCUTANEOUS ONCE
Refills: 0 | Status: COMPLETED | OUTPATIENT
Start: 2022-12-13 | End: 2022-12-13

## 2022-12-13 RX ORDER — METOPROLOL TARTRATE 50 MG
50 TABLET ORAL DAILY
Refills: 0 | Status: DISCONTINUED | OUTPATIENT
Start: 2022-12-13 | End: 2022-12-18

## 2022-12-13 RX ORDER — HEPARIN SODIUM 5000 [USP'U]/ML
6000 INJECTION INTRAVENOUS; SUBCUTANEOUS EVERY 6 HOURS
Refills: 0 | Status: DISCONTINUED | OUTPATIENT
Start: 2022-12-13 | End: 2022-12-18

## 2022-12-13 RX ORDER — INSULIN LISPRO 100/ML
VIAL (ML) SUBCUTANEOUS
Refills: 0 | Status: DISCONTINUED | OUTPATIENT
Start: 2022-12-13 | End: 2022-12-14

## 2022-12-13 RX ORDER — WARFARIN SODIUM 2.5 MG/1
5 TABLET ORAL ONCE
Refills: 0 | Status: COMPLETED | OUTPATIENT
Start: 2022-12-13 | End: 2022-12-13

## 2022-12-13 RX ORDER — HEPARIN SODIUM 5000 [USP'U]/ML
3000 INJECTION INTRAVENOUS; SUBCUTANEOUS EVERY 6 HOURS
Refills: 0 | Status: DISCONTINUED | OUTPATIENT
Start: 2022-12-13 | End: 2022-12-18

## 2022-12-13 RX ADMIN — Medication 6 UNIT(S): at 09:09

## 2022-12-13 RX ADMIN — Medication 2: at 09:09

## 2022-12-13 RX ADMIN — TICAGRELOR 90 MILLIGRAM(S): 90 TABLET ORAL at 13:30

## 2022-12-13 RX ADMIN — Medication 0.6 MILLIGRAM(S): at 06:00

## 2022-12-13 RX ADMIN — Medication 650 MILLIGRAM(S): at 06:00

## 2022-12-13 RX ADMIN — INSULIN GLARGINE 22 UNIT(S): 100 INJECTION, SOLUTION SUBCUTANEOUS at 21:16

## 2022-12-13 RX ADMIN — ATORVASTATIN CALCIUM 80 MILLIGRAM(S): 80 TABLET, FILM COATED ORAL at 21:15

## 2022-12-13 RX ADMIN — HEPARIN SODIUM 5000 UNIT(S): 5000 INJECTION INTRAVENOUS; SUBCUTANEOUS at 06:00

## 2022-12-13 RX ADMIN — WARFARIN SODIUM 5 MILLIGRAM(S): 2.5 TABLET ORAL at 21:15

## 2022-12-13 RX ADMIN — Medication 0.6 MILLIGRAM(S): at 18:29

## 2022-12-13 RX ADMIN — Medication 25 MILLIGRAM(S): at 06:00

## 2022-12-13 RX ADMIN — Medication 7 UNIT(S): at 18:23

## 2022-12-13 RX ADMIN — Medication 2: at 12:50

## 2022-12-13 RX ADMIN — CHLORHEXIDINE GLUCONATE 1 APPLICATION(S): 213 SOLUTION TOPICAL at 06:01

## 2022-12-13 RX ADMIN — TICAGRELOR 90 MILLIGRAM(S): 90 TABLET ORAL at 01:08

## 2022-12-13 RX ADMIN — HEPARIN SODIUM 6000 UNIT(S): 5000 INJECTION INTRAVENOUS; SUBCUTANEOUS at 20:15

## 2022-12-13 RX ADMIN — Medication 650 MILLIGRAM(S): at 13:30

## 2022-12-13 RX ADMIN — HEPARIN SODIUM 1300 UNIT(S)/HR: 5000 INJECTION INTRAVENOUS; SUBCUTANEOUS at 20:16

## 2022-12-13 RX ADMIN — Medication 50 MILLIGRAM(S): at 21:15

## 2022-12-13 RX ADMIN — LOSARTAN POTASSIUM 12.5 MILLIGRAM(S): 100 TABLET, FILM COATED ORAL at 13:29

## 2022-12-13 RX ADMIN — HEPARIN SODIUM 1300 UNIT(S)/HR: 5000 INJECTION INTRAVENOUS; SUBCUTANEOUS at 20:54

## 2022-12-13 RX ADMIN — Medication 6 UNIT(S): at 12:49

## 2022-12-13 RX ADMIN — HEPARIN SODIUM 5000 UNIT(S): 5000 INJECTION INTRAVENOUS; SUBCUTANEOUS at 13:30

## 2022-12-13 NOTE — DISCHARGE NOTE PROVIDER - CARE PROVIDER_API CALL
Kelsie Smyth)  Cardiovascular Disease  [facility]-Dept of Cardiology, [phone] [place] [address], [id number]  [place] [address]  Phone: [phone]  Fax: [phone]  Follow Up Time:

## 2022-12-13 NOTE — DIETITIAN INITIAL EVALUATION ADULT - OTHER INFO
As per chart review, Pt is a 43 yo M /w a PMH of DM2 who present with chest pain and found to have a STEMI with 100% occlusion of proximal LAD c/b reduced ef /w EF 30% now s/p PCI and stenting. Endocrinology for DM2 management given persistent hyperglycemia.  Pt states his appetite has been good and he has been tolerating his po intake well. Pt has no complaints of GI distress (nausea, vomiting, diarrhea, constipation). He has no difficulty chewing or swallowing and has no known food allergies.  Pt was diagnosed with DM2 "a while ago". He was prescribed medication which he took for a while. He stopped taking the medicine because he was feeling better and did not think he needed it. Pt's A1c was 10.1 at admission.   Pt reports his usual weight is 165 lbs. His admission weight was 163.3 lbs.   Instructed Pt and Pt's wife on consistent carb, low sodium, low cholesterol diet. Written information was given to Pt.  Both Pt and wife appeared to have a good understanding of diet using teach back.

## 2022-12-13 NOTE — CHART NOTE - NSCHARTNOTEFT_GEN_A_CORE
Patient with LV thrombus.  Start heparin gtt. Dose coumadin 5mg po X 1 tonight, and reduce ASA 81mg daily

## 2022-12-13 NOTE — PROGRESS NOTE ADULT - ASSESSMENT
This is a 43 yo M /w a PMH of DM2 who present with chest pain and found to have a STEMI with 100% occlusion of proximal LAD c/b reduced ef /w EF 30% now s/p PCI and stenting. Endocrinology for DM2 management given persistent hyperglycemia.     1. DM 2 with hyperglycemia  Uncontrolled, A1c 10.1%  Home Regimen: None, was taking Janumet at some point in the past    While inpatient:  BG target 100-180 mg/dl   Discouraged in between meal and bedtime snacking  Increase Lantus 22 units SQ qHS   Increase Admelog to 7 units SQ TID before meals (Hold if NPO/skips meal)   Increase Admelog correctional scale to MODERATE dose before meals, continue low dose at bedtime   Check BG before meals and bedtime  Consistent carbohydrate diet, RD consult done   Hypoglycemia protocol   DM Education: Please start education on insulin PEN (patient states he knows how to use, will need review), ensure patient completes return demonstration prior to discharge. Would also educate on glucometer and hypoglycemia recognition and treatment    Discharge Plan:   Recommend basal + oral regimen for discharge. Patient is agreeable  Patient will need new prescription for basal insulin PEN, options are: Lantus, Basaglar, Tresiba, Toujeo, Semglee   START Metformin 500 mg PO BID, increase to 1000 mg PO BID after 1 week (note, although he has CHF with reduced EF, he is currently stable with acceptable GFR and lactate levels. Ok to use Metformin)   START SGLT2i: Jardiance 10 mg PO daily OR Farxiga 5 mg PO daily (depending on insurance)- this will provide further glycemic control and also reduce complications and hospitalizations from CHF given low EF at 30%  STOP Janumet (per patient he was not recently taking)  Can consider GLP1RA as outpatient   Please prescribe a new glucometer, glucose test strips, lancets, alcohol swabs and insulin PEN needles    Followup with Endocrine Practice at [address] [place] [address], [address], [place] [address]; Ph # [phone]    2. HTN  BP target less than 130/80  On Losartan and Metoprolol  Within target, continue to monitor     3. HLD  LDL target less than 70  , s/p STEMI  Continue Atorvastatin 80mg daily if no contraindications    Discussed plan with primary team  Riya Walker  Nurse Practitioner  Division of Endocrinology & Diabetes  In house pager #[phone]/long range pager #[phone]    If before 9AM or after 6PM, or on weekends/holidays, please call endocrine answering service for assistance ([phone]).  For nonurgent matters email [email or web address] for assistance.

## 2022-12-13 NOTE — DISCHARGE NOTE PROVIDER - NSDCFUSCHEDAPPT_GEN_ALL_CORE_FT
Kelsie Smyth Physician [facility]  CARDIOLOGY [id number]  Scheduled Appointment: 12/23/2022     Kelsie Smyth  [facility]  CARDIOLOGY [id number]  Scheduled Appointment: 12/23/2022    Desirae Pastor  [facility]  ENDOCRIN [address] [facility]  Scheduled Appointment: 01/13/2023    Melody Gardner  [facility]  ENDOCRIN [address] [facility]  Scheduled Appointment: 03/10/2023

## 2022-12-13 NOTE — DIETITIAN INITIAL EVALUATION ADULT - PERTINENT LABORATORY DATA
12-13    132<L>  |  98  |  13  ----------------------------<  303<H>  4.4   |  24  |  0.71    Ca    9.4      13 Dec 2022 06:17  Phos  3.0     12-13  Mg     1.80     12-13    TPro  6.5  /  Alb  3.7  /  TBili  1.0  /  DBili  <0.2  /  AST  79<H>  /  ALT  63<H>  /  AlkPhos  52  12-13  POCT Blood Glucose.: 241 mg/dL (12-13-22 @ 12:00)  A1C with Estimated Average Glucose Result: 10.1 % (12-11-22 @ 04:27)  A1C with Estimated Average Glucose Result: 10.2 % (12-11-22 @ 01:20)

## 2022-12-13 NOTE — DISCHARGE NOTE PROVIDER - NSDCCPCAREPLAN_GEN_ALL_CORE_FT
PRINCIPAL DISCHARGE DIAGNOSIS  Diagnosis: ST elevation MI (STEMI)  Assessment and Plan of Treatment: Please continue your statin medication to control cholesterol levels, as well as, Aspirin 81 mg PLAVIX 75 mg as prescribed in order to optimize your heart health.   Follow-up with your primary provider/cardiologist as outpatient for ongoing care. If you have persistent chest pain, shortness of breath, heart palpitations, or dizziness you should return to the emergency room.        SECONDARY DISCHARGE DIAGNOSES  Diagnosis: Hyperlipidemia  Assessment and Plan of Treatment: Continue prescribed medications to control your cholesterol levels and a DASH (Low fat/salt) diet. Follow up with your primary care provider upon discharge for further management and monitoring of cholesterol levels.    Diagnosis: Hypertension  Assessment and Plan of Treatment: Continue blood pressure medication regimen as directed. Monitor for any visual changes, headaches or dizziness.  Monitor blood pressure regularly.  Follow up with your primary care provider for further management for high blood pressure.    Diagnosis: DM2 (diabetes mellitus, type 2)  Assessment and Plan of Treatment: Monitor blood glucose levels throughout the day before meals and at bedtime. Record blood sugars and bring to outpatient providers appointment in order to be reviewed by your doctor for management modifications. If your sugars are more than 400 or less than 70 you should contact your PCP immediately. Monitor for signs/symptoms of low blood glucose, such as, dizziness, altered mental status, or cool/clammy skin. In addition, monitor for signs/symptoms of high blood glucose, such as, feeling hot, dry, fatigued, or with increased thirst/urination. Make regular podiatry appointments in order to have feet checked for wounds and uncontrolled toe nail growth to prevent infections, as well as, appointments with an ophthalmologist to monitor your vision.     PRINCIPAL DISCHARGE DIAGNOSIS  Diagnosis: ST elevation MI (STEMI)  Assessment and Plan of Treatment: Please continue your statin medication to control cholesterol levels, as well as, Aspirin 81 mg PLAVIX 75 mg as prescribed in order to optimize your heart health.   Follow-up with your primary provider/cardiologist as outpatient for ongoing care. If you have persistent chest pain, shortness of breath, heart palpitations, or dizziness you should return to the emergency room.        SECONDARY DISCHARGE DIAGNOSES  Diagnosis: DM2 (diabetes mellitus, type 2)  Assessment and Plan of Treatment: Monitor blood glucose levels throughout the day before meals and at bedtime. Record blood sugars and bring to outpatient providers appointment in order to be reviewed by your doctor for management modifications. If your sugars are more than 400 or less than 70 you should contact your PCP immediately. Monitor for signs/symptoms of low blood glucose, such as, dizziness, altered mental status, or cool/clammy skin. In addition, monitor for signs/symptoms of high blood glucose, such as, feeling hot, dry, fatigued, or with increased thirst/urination. Make regular podiatry appointments in order to have feet checked for wounds and uncontrolled toe nail growth to prevent infections, as well as, appointments with an ophthalmologist to monitor your vision.    Diagnosis: Hypertension  Assessment and Plan of Treatment: Continue blood pressure medication regimen as directed. Monitor for any visual changes, headaches or dizziness.  Monitor blood pressure regularly.  Follow up with your primary care provider for further management for high blood pressure.    Diagnosis: Hyperlipidemia  Assessment and Plan of Treatment: Continue prescribed medications to control your cholesterol levels and a DASH (Low fat/salt) diet. Follow up with your primary care provider upon discharge for further management and monitoring of cholesterol levels.    Diagnosis: LV (left ventricular) mural thrombus  Assessment and Plan of Treatment: After the heart attack, you were found to have a clot inside your heart and started on coumadin. Please continue taking coumadin and follow up with your cardiologist within 2-3 days to check your INR.

## 2022-12-13 NOTE — PROGRESS NOTE ADULT - ASSESSMENT
42 y/p [place] male with h/o smoking and pre DM2 presents with chest pain - + AWSTEMI and now s/p JESUS to [place] and POBA to stefanie

## 2022-12-13 NOTE — PROGRESS NOTE ADULT - ASSESSMENT
Pt given urinal and encouraged to provide urine sample.    43 y/o male [place] male with h/o smoking and pre DM2 presents with chest pain - + AWSTEMI and now s/p JESUS to pLAD and POBA to DIAG.     AWSTEMI  - s/p JESUS to pLAD  - continue DAPT, Lipitor  - change metoprolol 25 mg po BID to Toprol 50mg xl daily   - TTE EF 30%, severe LV systolic dysfunction, normal RV   - trend cardiac enzymes until trending down  - pleuritic chest pain, on high dose ASA and colchicine x 7 days  - Continue Losartan 12.5mg Po daily  - repeat echo today to r/o LV thrombus, if neg d/c home and follow up with Dr. Smyth    43 y/o male Israeli male with h/o smoking and pre DM2 presents with chest pain - + AWSTEMI and now s/p JESUS to pLAD and POBA to DIAG.     AWSTEMI  - s/p JESUS to pLAD  - continue DAPT, Lipitor  - change metoprolol 25 mg po BID to Toprol 50mg xl daily   - TTE EF 30%, severe LV systolic dysfunction, normal RV   - trend cardiac enzymes until trending down  - pleuritic chest pain, on high dose ASA and colchicine x 7 days  - Continue Losartan 12.5mg Po daily  - repeat echo today to r/o LV thrombus, if neg d/c home and follow up with Dr. Smyth 12/23 at 12:30pm   41 y/o male [place]ian male with h/o smoking and pre DM2 presents with chest pain - + AWSTEMI and now s/p JESUS to pLAD and POBA to DIAG.     AWSTEMI  - s/p JESUS to pLAD  - continue DAPT, Lipitor  - change metoprolol 25 mg po BID to Toprol 50mg xl daily   - TTE EF 30%, severe LV systolic dysfunction, normal RV   - trend cardiac enzymes until trending down  - pleuritic chest pain, on high dose ASA and colchicine x 7 days  - Continue Losartan 12.5mg Po daily  - repeat echo today to r/o LV thrombus, if neg d/c home and follow up with Dr. Smyth 12/23 at 11:30pm   41 y/o male [place] male with h/o smoking and pre DM2 presents with chest pain - + AWSTEMI and now s/p JESUS to pLAD and POBA to DIAG.     AWSTEMI  - s/p JESUS to pLAD  - continue DAPT, Lipitor  - change metoprolol 25 mg po BID to Toprol 50mg xl daily   - TTE EF 30%, severe LV systolic dysfunction, normal RV   - trend cardiac enzymes until trending down  - pleuritic chest pain, on high dose ASA and colchicine x 7 days  - Continue Losartan 12.5mg Po daily  - repeat echo today to r/o LV thrombus, if neg d/c home and follow up with Dr. Smyth 12/23 at 11:30pm

## 2022-12-13 NOTE — DIETITIAN INITIAL EVALUATION ADULT - NS FNS DIET ORDER
Diet, DASH/TLC:   Sodium & Cholesterol Restricted  Consistent Carbohydrate {No Snacks} (CSTCHO)     Special Instructions for Nursing:  Sodium & Cholesterol Restricted (12-11-22 @ 08:12)

## 2022-12-13 NOTE — PROGRESS NOTE ADULT - SUBJECTIVE AND OBJECTIVE BOX
[facility] Division of Hospital Medicine  Ameena Salguero MD  Available via MS Teams    SUBJECTIVE / OVERNIGHT EVENTS: No acute overnight events. Pt reports chest pain and throat pain resolved. Denies shortness of breath    ADDITIONAL REVIEW OF SYSTEMS: Denies fevers, chills, chest pain, abd pain, n/v/d, sob    MEDICATIONS  (STANDING):  aspirin 650 milliGRAM(s) Oral every 8 hours  atorvastatin 80 milliGRAM(s) Oral at bedtime  chlorhexidine 2% Cloths 1 Application(s) Topical <User Schedule>  colchicine 0.6 milliGRAM(s) Oral every 12 hours  dextrose 5%. 1000 milliLiter(s) (100 mL/Hr) IV Continuous <Continuous>  dextrose 5%. 1000 milliLiter(s) (50 mL/Hr) IV Continuous <Continuous>  dextrose 50% Injectable 25 Gram(s) IV Push once  dextrose 50% Injectable 12.5 Gram(s) IV Push once  dextrose 50% Injectable 25 Gram(s) IV Push once  glucagon  Injectable 1 milliGRAM(s) IntraMuscular once  heparin   Injectable 5000 Unit(s) SubCutaneous every 8 hours  influenza   Vaccine 0.5 milliLiter(s) IntraMuscular once  insulin glargine Injectable (LANTUS) 22 Unit(s) SubCutaneous at bedtime  insulin lispro (ADMELOG) corrective regimen sliding scale   SubCutaneous three times a day before meals  insulin lispro (ADMELOG) corrective regimen sliding scale   SubCutaneous at bedtime  insulin lispro Injectable (ADMELOG) 7 Unit(s) SubCutaneous three times a day before meals  losartan 12.5 milliGRAM(s) Oral daily  metoprolol succinate ER 50 milliGRAM(s) Oral daily  ticagrelor 90 milliGRAM(s) Oral every 12 hours    MEDICATIONS  (PRN):  acetaminophen     Tablet .. 650 milliGRAM(s) Oral every 6 hours PRN Mild Pain (1 - 3), Moderate Pain (4 - 6)  dextrose Oral Gel 15 Gram(s) Oral once PRN Blood Glucose LESS THAN 70 milliGRAM(s)/deciliter      I&O's Summary    12 Dec 2022 07:01  -  13 Dec 2022 07:00  --------------------------------------------------------  IN: 0 mL / OUT: 2000 mL / NET: -2000 mL    13 Dec 2022 07:01  -  13 Dec 2022 17:03  --------------------------------------------------------  IN: 0 mL / OUT: 500 mL / NET: -500 mL        PHYSICAL EXAM:  Vital Signs Last 24 Hrs  T(C): 37 (13 Dec 2022 11:48), Max: 37.5 (12 Dec 2022 19:53)  T(F): 98.6 (13 Dec 2022 11:48), Max: 99.5 (12 Dec 2022 19:53)  HR: 102 (13 Dec 2022 11:48) (98 - 104)  BP: 111/65 (13 Dec 2022 11:48) (96/58 - 111/65)  BP(mean): --  RR: 16 (13 Dec 2022 11:48) (16 - 18)  SpO2: 100% (13 Dec 2022 11:48) (98% - 100%)    Parameters below as of 13 Dec 2022 11:48  Patient On (Oxygen Delivery Method): room air      CONSTITUTIONAL: NAD, well-developed, well-groomed  EYES: PERRLA; conjunctiva and sclera clear  ENMT: Moist oral mucosa, no pharyngeal injection or exudates; normal dentition  RESPIRATORY: Normal respiratory effort; lungs are clear to auscultation bilaterally  CARDIOVASCULAR: Regular rate and rhythm, normal S1 and S2, no murmur/rub/gallop; No lower extremity edema; Peripheral pulses are 2+ bilaterally  ABDOMEN: Nontender to palpation, normoactive bowel sounds, no rebound/guarding; No hepatosplenomegaly  MUSCULOSKELETAL:  Normal gait; no clubbing or cyanosis of digits; no joint swelling or tenderness to palpation  PSYCH: A+O to person, place, and time; affect appropriate  NEUROLOGY: CN 2-12 are intact and symmetric; no gross sensory deficits   SKIN: No rashes; no palpable lesions    LABS:                        14.9   10.72 )-----------( 174      ( 13 Dec 2022 06:17 )             45.4     Hb Trend: 14.9<--, 16.3<--, 16.0<--, 16.4<--, 17.0<--  12-13    132<L>  |  98  |  13  ----------------------------<  303<H>  4.4   |  24  |  0.71    Ca    9.4      13 Dec 2022 06:17  Phos  3.0       Mg     1.80         TPro  6.5  /  Alb  3.7  /  TBili  1.0  /  DBili  <0.2  /  AST  79<H>  /  ALT  63<H>  /  AlkPhos  52      Creatinine Trend: 0.71<--, 0.77<--, 0.86<--, 0.62<--, 0.81<--  PT/INR - ( 11 Dec 2022 22:52 )   PT: 13.7 sec;   INR: 1.18 ratio         PTT - ( 11 Dec 2022 22:52 )  PTT:26.6 sec  CARDIAC MARKERS ( 12 Dec 2022 06:29 )  x     / x     / 1628 U/L / x     / 41.4 ng/mL  CARDIAC MARKERS ( 12 Dec 2022 05:50 )  x     / x     / 1612 U/L / x     / 41.7 ng/mL  CARDIAC MARKERS ( 11 Dec 2022 22:52 )  x     / x     / 2146 U/L / x     / 67.7 ng/mL      Urinalysis Basic - ( 12 Dec 2022 01:10 )    Color: Light Yellow / Appearance: Clear / S.025 / pH: x  Gluc: x / Ketone: Small  / Bili: Negative / Urobili: <2 mg/dL   Blood: x / Protein: Negative / Nitrite: Negative   Leuk Esterase: Negative / RBC: x / WBC x   Sq Epi: x / Non Sq Epi: x / Bacteria: x        Culture - Blood (collected 11 Dec 2022 23:59)  Source: .Blood Blood-Peripheral  Preliminary Report (13 Dec 2022 04:02):    No growth to date.    Culture - Blood (collected 11 Dec 2022 22:42)  Source: .Blood Blood-Peripheral  Preliminary Report (13 Dec 2022 04:02):    No growth to date.      SARS-CoV-2: NotDetec (12 Dec 2022 02:02)      RADIOLOGY & ADDITIONAL TESTS:  New Imaging Personally Reviewed Today:  New Electrocardiogram Personally Reviewed Today:  Other Results Reviewed Today:   Prior or Outpatient Records Reviewed Today with Summary:    COORDINATION OF CARE:  Consultant Communication and Details of Discussion (where applicable):

## 2022-12-13 NOTE — DISCHARGE NOTE PROVIDER - HOSPITAL COURSE
43 yo M /w a PMH of DM2 who present with chest pain and found to have a STEMI with 100% occlusion of proximal LAD, now s/p JESUS to pLAD and POBA to DIAG. Hospital course complicated by c/b reduced ef /w EF 30% now s/p PCI and stenting. Endocrinology for DM2 management given persistent hyperglycemia.     - s/p JESUS to pLAD  - continue DAPT, Lipitor  - change metoprolol 25 mg po BID to Toprol 50mg xl daily   - TTE EF 30%, severe LV systolic dysfunction, normal RV   - trend cardiac enzymes until trending down  - pleuritic chest pain, on high dose ASA and colchicine x 7 days  - Continue Losartan 12.5mg Po daily  -c/w atorvastatin 80mg daily  - repeat echo today to r/o LV thrombus, if neg d/c home and follow up with Dr. Smyth 12/23 at 11:30pm      -follow up at [facility] at [address] [place] [address], [address], [place] [address];  # [phone]  -start metformin 500mg BID and increase to 1000mg BID after 1 week  -start either Jardiance 10mg daily or Farxiga 5mg daily depending on insurance - this will provide further glycemic control and also reduce complications and hospitalizations from CHF given low EF at 30%  -consider starting GLP1 agonist as outpatient   -please prescribe a new glucometer, lancet, and test strips - please ensure nurse teaches patient how to check FSG prior to discharge    On ___ this case was reviewed with  ____, the patient is medically stable and optimized for discharge as per attending. All medications were reviewed and prescriptions were sent to mutually agreed upon pharmacy. Discharge plan reviewed with patient and/or family.   41 yo M /w a PMH of DM2 who present with chest pain and found to have a STEMI with 100% occlusion of proximal LAD, now s/p JESUS to pLAD and POBA to DIAG. Hospital course complicated by c/b reduced ef /w EF 30% now s/p PCI and stenting. Endocrinology for DM2 management given persistent hyperglycemia.     - s/p JESUS to pLAD  - continue DAPT, Lipitor  - change metoprolol 25 mg po BID to Toprol 50mg xl daily   - TTE EF 30%, severe LV systolic dysfunction, normal RV   - trend cardiac enzymes until trending down  - pleuritic chest pain, on high dose ASA and colchicine x 7 days  - Continue Losartan 12.5mg Po daily  -c/w atorvastatin 80mg daily  - repeat echo today to r/o LV thrombus, if neg d/c home and follow up with Dr. Smyth 12/23 at 11:30pm      -follow up at [facility] Practice at [address] [place] [address], [address], [place] [address];  # [phone]  -start Lantus 22 units qhs, metformin 500mg BID and increase to 1000mg BID after 1 week  -start either Jardiance 10mg daily or Farxiga 5mg daily depending on insurance - this will provide further glycemic control and also reduce complications and hospitalizations from CHF given low EF at 30%  -consider starting GLP1 agonist as outpatient   -please prescribe a new glucometer, lancet, and test strips - please ensure nurse teaches patient how to check FSG prior to discharge    On ___ this case was reviewed with  ____, the patient is medically stable and optimized for discharge as per attending. All medications were reviewed and prescriptions were sent to mutually agreed upon pharmacy. Discharge plan reviewed with patient and/or family.   41 yo M /w a PMH of DM2 who present with chest pain and found to have a STEMI with 100% occlusion of proximal LAD, now s/p JESUS to pLAD and POBA to DIAG. Hospital course complicated by c/b reduced ef /w EF 30% now s/p PCI and stenting. Endocrinology for DM2 management given persistent hyperglycemia.     - s/p JESUS to pLAD  - continue DAPT, Lipitor  - change metoprolol 25 mg po BID to Toprol 50mg xl daily   - TTE EF 30%, severe LV systolic dysfunction, normal RV   - trend cardiac enzymes until trending down  - pleuritic chest pain, on high dose ASA and colchicine x 7 days  - Continue Losartan 12.5mg Po daily  -c/w atorvastatin 80mg daily  - repeat echo today to r/o LV thrombus, if neg d/c home and follow up with Dr. Smyth 12/23 at 11:30pm      -follow up at [facility] at [address] [place] [address], [address], [place] [address];  # [phone]  -start Lantus 22 units qhs, metformin 500mg BID and increase to 1000mg BID after 1 week  -start either Jardiance 10mg daily or Farxiga 5mg daily depending on insurance - this will provide further glycemic control and also reduce complications and hospitalizations from CHF given low EF at 30%  -consider starting GLP1 agonist as outpatient   -please prescribe a new glucometer, lancet, and test strips - please ensure nurse teaches patient how to check FSG prior to discharge    On 12/19 this case was reviewed with Dr. LUKE the patient is medically stable and optimized for discharge as per attending. All medications were reviewed and prescriptions were sent to mutually agreed upon pharmacy. Discharge plan reviewed with patient and/or family.   43 yo M /w a PMH of DM2 who present with chest pain and found to have a STEMI with 100% occlusion of proximal LAD, now s/p JESUS to pLAD and POBA to DIAG. Hospital course complicated by c/b reduced ef /w EF 30% now s/p PCI and stenting. Endocrinology for DM2 management given persistent hyperglycemia.     - s/p JESUS to pLAD  - continue DAPT, Lipitor  - change metoprolol 25 mg po BID to Toprol 50mg xl daily   - TTE EF 30%, severe LV systolic dysfunction, normal RV   - trend cardiac enzymes until trending down  - pleuritic chest pain, on high dose ASA and colchicine x 7 days  - Continue Losartan 12.5mg Po daily  -c/w atorvastatin 80mg daily  - repeat echo today to r/o LV thrombus, if neg d/c home and follow up with Dr. Smyth 12/23 at 11:30pm      -follow up at [facility] at [address] [place] [address], [address], [place] [address];  # [phone]  -start Lantus 22 units qhs, metformin 500mg BID and increase to 1000mg BID after 1 week  -start either Jardiance 10mg daily or Farxiga 5mg daily depending on insurance - this will provide further glycemic control and also reduce complications and hospitalizations from CHF given low EF at 30%  -consider starting GLP1 agonist as outpatient   -please prescribe a new glucometer, lancet, and test strips - please ensure nurse teaches patient how to check FSG prior to discharge    On 12/19 this case was reviewed with Dr. LUKE the patient is medically stable and optimized for discharge as per attending. All medications were reviewed and prescriptions were sent to mutually agreed upon pharmacy. Discharge plan reviewed with patient and/or family.   43 yo M /w a PMH of DM2 who present with chest pain and found to have a STEMI with 100% occlusion of proximal LAD, now s/p JESUS to pLAD and POBA to DIAG. Hospital course complicated by c/b reduced ef /w EF 30% now s/p PCI and stenting. Endocrinology for DM2 management given persistent hyperglycemia.     - s/p JESUS to pLAD  - continue DAPT, Lipitor  - change metoprolol 25 mg po BID to Toprol 50mg xl daily   - TTE EF 30%, severe LV systolic dysfunction, normal RV   - trend cardiac enzymes until trending down  - pleuritic chest pain, on high dose ASA and colchicine x 7 days  - Continue Losartan 12.5mg Po daily  -c/w atorvastatin 80mg daily  - echo shows LV thrombus, now therapeutic on coumadin follow up with Dr. Smyth 12/23 at 11:30pm      -follow up at [facility] at [address] [place] [address], [address], [place] [address];  # [phone]  -start Lantus 22 units qhs, metformin 500mg BID and increase to 1000mg BID after 1 week  -start either Jardiance 10mg daily or Farxiga 5mg daily depending on insurance - this will provide further glycemic control and also reduce complications and hospitalizations from CHF given low EF at 30%  -consider starting GLP1 agonist as outpatient   -please prescribe a new glucometer, lancet, and test strips - please ensure nurse teaches patient how to check FSG prior to discharge    On 12/19 this case was reviewed with Dr. LUKE the patient is medically stable and optimized for discharge as per attending. All medications were reviewed and prescriptions were sent to mutually agreed upon pharmacy. Discharge plan reviewed with patient and/or family.

## 2022-12-13 NOTE — DIETITIAN INITIAL EVALUATION ADULT - PERTINENT MEDS FT
MEDICATIONS  (STANDING):  aspirin 650 milliGRAM(s) Oral every 8 hours  atorvastatin 80 milliGRAM(s) Oral at bedtime  chlorhexidine 2% Cloths 1 Application(s) Topical <User Schedule>  colchicine 0.6 milliGRAM(s) Oral every 12 hours  dextrose 5%. 1000 milliLiter(s) (100 mL/Hr) IV Continuous <Continuous>  dextrose 5%. 1000 milliLiter(s) (50 mL/Hr) IV Continuous <Continuous>  dextrose 50% Injectable 25 Gram(s) IV Push once  dextrose 50% Injectable 12.5 Gram(s) IV Push once  dextrose 50% Injectable 25 Gram(s) IV Push once  glucagon  Injectable 1 milliGRAM(s) IntraMuscular once  heparin   Injectable 5000 Unit(s) SubCutaneous every 8 hours  influenza   Vaccine 0.5 milliLiter(s) IntraMuscular once  insulin glargine Injectable (LANTUS) 18 Unit(s) SubCutaneous at bedtime  insulin lispro (ADMELOG) corrective regimen sliding scale   SubCutaneous three times a day before meals  insulin lispro (ADMELOG) corrective regimen sliding scale   SubCutaneous at bedtime  insulin lispro Injectable (ADMELOG) 6 Unit(s) SubCutaneous three times a day before meals  losartan 12.5 milliGRAM(s) Oral daily  metoprolol tartrate 25 milliGRAM(s) Oral two times a day  ticagrelor 90 milliGRAM(s) Oral every 12 hours    MEDICATIONS  (PRN):  acetaminophen     Tablet .. 650 milliGRAM(s) Oral every 6 hours PRN Mild Pain (1 - 3), Moderate Pain (4 - 6)  dextrose Oral Gel 15 Gram(s) Oral once PRN Blood Glucose LESS THAN 70 milliGRAM(s)/deciliter

## 2022-12-13 NOTE — PROGRESS NOTE ADULT - SUBJECTIVE AND OBJECTIVE BOX
Chief Complaint: DM 2 with hyperglycemia     History: Patient seen at bedside with family member present. Patient reports he is eating meals, denies n/v, denies s/s of hypoglycemia  Noted with glucose 303 mg/dl on serum this morning, patient states he ate a banana (between 4-5 AM) before blood was drawn   Discussed diabetes discharge plan, current insulin requirements and A1c 10.1%  Reviewed adding basal insulin to plan (in addition to Metformin and SGLT2i), patient is agreeable to this  Has some familiarity with insulin pen use, needs review prior to DC     MEDICATIONS  (STANDING):  aspirin 650 milliGRAM(s) Oral every 8 hours  atorvastatin 80 milliGRAM(s) Oral at bedtime  chlorhexidine 2% Cloths 1 Application(s) Topical <User Schedule>  colchicine 0.6 milliGRAM(s) Oral every 12 hours  dextrose 5%. 1000 milliLiter(s) (100 mL/Hr) IV Continuous <Continuous>  dextrose 5%. 1000 milliLiter(s) (50 mL/Hr) IV Continuous <Continuous>  dextrose 50% Injectable 25 Gram(s) IV Push once  dextrose 50% Injectable 12.5 Gram(s) IV Push once  dextrose 50% Injectable 25 Gram(s) IV Push once  glucagon  Injectable 1 milliGRAM(s) IntraMuscular once  heparin   Injectable 5000 Unit(s) SubCutaneous every 8 hours  influenza   Vaccine 0.5 milliLiter(s) IntraMuscular once  insulin glargine Injectable (LANTUS) 18 Unit(s) SubCutaneous at bedtime  insulin lispro (ADMELOG) corrective regimen sliding scale   SubCutaneous three times a day before meals  insulin lispro (ADMELOG) corrective regimen sliding scale   SubCutaneous at bedtime  insulin lispro Injectable (ADMELOG) 6 Unit(s) SubCutaneous three times a day before meals  losartan 12.5 milliGRAM(s) Oral daily  metoprolol succinate ER 50 milliGRAM(s) Oral daily  ticagrelor 90 milliGRAM(s) Oral every 12 hours    MEDICATIONS  (PRN):  acetaminophen     Tablet .. 650 milliGRAM(s) Oral every 6 hours PRN Mild Pain (1 - 3), Moderate Pain (4 - 6)  dextrose Oral Gel 15 Gram(s) Oral once PRN Blood Glucose LESS THAN 70 milliGRAM(s)/deciliter    No Known Allergies    Review of Systems:  Cardiovascular: No chest pain  Respiratory: No SOB  GI: No nausea, vomiting  Endocrine: no hypoglycemia     PHYSICAL EXAM:  VITALS: T(C): 37 (12-13-22 @ 11:48)  T(F): 98.6 (12-13-22 @ 11:48), Max: 99.5 (12-12-22 @ 19:53)  HR: 102 (12-13-22 @ 11:48) (98 - 108)  BP: 111/65 (12-13-22 @ 11:48) (96/58 - 111/65)  RR:  (16 - 18)  SpO2:  (98% - 100%)  Wt(kg): --  GENERAL: NAD  EYES: No proptosis, no lid lag, anicteric  HEENT:  Atraumatic, Normocephalic, moist mucous membranes  RESPIRATORY: unlabored respirations     CAPILLARY BLOOD GLUCOSE    POCT Blood Glucose.: 241 mg/dL (13 Dec 2022 12:00)  POCT Blood Glucose.: 204 mg/dL (13 Dec 2022 08:29)  POCT Blood Glucose.: 207 mg/dL (12 Dec 2022 21:48)  POCT Blood Glucose.: 264 mg/dL (12 Dec 2022 17:52)      12-13    132<L>  |  98  |  13  ----------------------------<  303<H>  4.4   |  24  |  0.71    eGFR: 118    Ca    9.4      12-13  Mg     1.80     12-13  Phos  3.0     12-13    TPro  6.5  /  Alb  3.7  /  TBili  1.0  /  DBili  <0.2  /  AST  79<H>  /  ALT  63<H>  /  AlkPhos  52  12-13      Thyroid Function Tests:  12-11 @ 04:27 TSH 0.60 FreeT4 -- T3 -- Anti TPO -- Anti Thyroglobulin Ab -- TSI --  12-11 @ 01:20 TSH 1.16 FreeT4 -- T3 -- Anti TPO -- Anti Thyroglobulin Ab -- TSI --      A1C with Estimated Average Glucose Result: 10.1 % (12-11-22 @ 04:27)  A1C with Estimated Average Glucose Result: 10.2 % (12-11-22 @ 01:20)    Diet, DASH/TLC:   Sodium & Cholesterol Restricted  Consistent Carbohydrate No Snacks (CSTCHO)     Special Instructions for Nursing:  Sodium & Cholesterol Restricted (12-11-22 @ 08:12) [Active]

## 2022-12-13 NOTE — PROGRESS NOTE ADULT - SUBJECTIVE AND OBJECTIVE BOX
Dante Hughes NP  CCU Service  Cardiology   Spect: [id number] ([facility])     PATIENT: SHERINE GONZALEZ, MRN: [id number]    CHIEF COMPLAINT: Patient is a 42y old  Male who presents with a chief complaint of chest pain (12 Dec 2022 17:09)      INTERVAL HISTORY/OVERNIGHT EVENTS: No overnight events. Denies abdominal pain, chest pain or SOB, cough. Has been ambulating with assistance. Oriented to person, place, and time. Breathing comfortably on room air.    REVIEW OF SYSTEMS:    Constitutional:     [ ] negative [ ] fevers [ ] chills [ ] weight loss [ ] weight gain  HEENT:                  [ ] negative [ ] dry eyes [ ] eye irritation [ ] postnasal drip [ ] nasal congestion  CV:                         [ ] negative  [ ] chest pain [ ] orthopnea [ ] palpitations [ ] murmur  Resp:                     [ ] negative [ ] cough [ ] shortness of breath [ ] dyspnea [ ] wheezing [ ] sputum [ ] hemoptysis  GI:                          [ ] negative [ ] nausea [ ] vomiting [ ] diarrhea [ ] constipation [ ] abd pain [ ] dysphagia   :                        [ ] negative [ ] dysuria [ ] nocturia [ ] hematuria [ ] increased urinary frequency  Musculoskeletal: [ ] negative [ ] back pain [ ] myalgias [ ] arthralgias [ ] fracture  Skin:                       [ ] negative [ ] rash [ ] itch  Neurological:        [ ] negative [ ] headache [ ] dizziness [ ] syncope [ ] weakness [ ] numbness  Psychiatric:           [ ] negative [ ] anxiety [ ] depression  Endocrine:            [ ] negative [x ] diabetes [ ] thyroid problem  Heme/Lymph:      [ ] negative [ ] anemia [ ] bleeding problem  Allergic/Immune: [ ] negative [ ] itchy eyes [ ] nasal discharge [ ] hives [ ] angioedema    [x ] All other systems negative  [ ] Unable to assess ROS because ________.    MEDICATIONS:  MEDICATIONS  (STANDING):  aspirin 650 milliGRAM(s) Oral every 8 hours  atorvastatin 80 milliGRAM(s) Oral at bedtime  chlorhexidine 2% Cloths 1 Application(s) Topical <User Schedule>  colchicine 0.6 milliGRAM(s) Oral every 12 hours  heparin   Injectable 5000 Unit(s) SubCutaneous every 8 hours  influenza   Vaccine 0.5 milliLiter(s) IntraMuscular once  insulin glargine Injectable (LANTUS) 18 Unit(s) SubCutaneous at bedtime  insulin lispro (ADMELOG) corrective regimen sliding scale   SubCutaneous three times a day before meals  insulin lispro (ADMELOG) corrective regimen sliding scale   SubCutaneous at bedtime  insulin lispro Injectable (ADMELOG) 6 Unit(s) SubCutaneous three times a day before meals  losartan 12.5 milliGRAM(s) Oral daily  metoprolol tartrate 25 milliGRAM(s) Oral two times a day  ticagrelor 90 milliGRAM(s) Oral every 12 hours    MEDICATIONS  (PRN):  acetaminophen     Tablet .. 650 milliGRAM(s) Oral every 6 hours PRN Mild Pain (1 - 3), Moderate Pain (4 - 6)  dextrose Oral Gel 15 Gram(s) Oral once PRN Blood Glucose LESS THAN 70 milliGRAM(s)/deciliter      ALLERGIES: Allergies    No Known Allergies    Intolerances        OBJECTIVE:  ICU Vital Signs Last 24 Hrs  T(C): 37.4 (13 Dec 2022 05:56), Max: 37.5 (12 Dec 2022 19:53)  T(F): 99.3 (13 Dec 2022 05:56), Max: 99.5 (12 Dec 2022 19:53)  HR: 104 (13 Dec 2022 05:56) (98 - 113)  BP: 96/58 (13 Dec 2022 05:56) (96/58 - 106/72)  RR: 16 (13 Dec 2022 05:56) (16 - 18)  SpO2: 99% (13 Dec 2022 05:56) (98% - 100%)    O2 Parameters below as of 13 Dec 2022 05:56  Patient On (Oxygen Delivery Method): room air        CAPILLARY BLOOD GLUCOSE      POCT Blood Glucose.: 204 mg/dL (13 Dec 2022 08:29)  POCT Blood Glucose.: 207 mg/dL (12 Dec 2022 21:48)  POCT Blood Glucose.: 264 mg/dL (12 Dec 2022 17:52)  POCT Blood Glucose.: 246 mg/dL (12 Dec 2022 12:48)  POCT Blood Glucose.: 227 mg/dL (12 Dec 2022 09:19)    CAPILLARY BLOOD GLUCOSE      POCT Blood Glucose.: 204 mg/dL (13 Dec 2022 08:29)    I&O's Summary    12 Dec 2022 07:01  -  13 Dec 2022 07:00  --------------------------------------------------------  IN: 0 mL / OUT: 2000 mL / NET: -2000 mL      Daily     Daily     PHYSICAL EXAMINATION:  General: Comfortable, no acute distress, cooperative with exam.  HEENT: Moist mucous membranes.  Respiratory: CTAB, normal respiratory effort, no coughing, wheezes, crackles, or rales.  CV: RRR, S1S2, no murmurs, rubs or gallops. No JVD. Distal pulses intact.  Abdominal: Soft, nontender, nondistended, no rebound or guarding, normal bowel sounds.  Neurology: AOx3, no focal neuro defects, MELO x 4.  Extremities: No pitting edema, + Peripheral pulses.  Incisions:   Tubes:    LABS:                          14.9   10.72 )-----------( 174      ( 13 Dec 2022 06:17 )             45.4     12-13    132<L>  |  98  |  13  ----------------------------<  303<H>  4.4   |  24  |  0.71    Ca    9.4      13 Dec 2022 06:17  Phos  3.0     12-13  Mg     1.80     12-    TPro  6.5  /  Alb  3.7  /  TBili  1.0  /  DBili  <0.2  /  AST  79<H>  /  ALT  63<H>  /  AlkPhos  52  12-13    LIVER FUNCTIONS - ( 13 Dec 2022 06:17 )  Alb: 3.7 g/dL / Pro: 6.5 g/dL / ALK PHOS: 52 U/L / ALT: 63 U/L / AST: 79 U/L / GGT: x           PT/INR - ( 11 Dec 2022 22:52 )   PT: 13.7 sec;   INR: 1.18 ratio         PTT - ( 11 Dec 2022 22:52 )  PTT:26.6 sec    CARDIAC MARKERS:  Reviewed     Urinalysis Basic - ( 12 Dec 2022 01:10 )    Color: Light Yellow / Appearance: Clear / S.025 / pH: x  Gluc: x / Ketone: Small  / Bili: Negative / Urobili: <2 mg/dL   Blood: x / Protein: Negative / Nitrite: Negative   Leuk Esterase: Negative / RBC: x / WBC x   Sq Epi: x / Non Sq Epi: x / Bacteria: x        TELEMETRY: NSR    EKG:     IMAGING:     TTE with Doppler (w/Cont) (22 @ 08:31) >  CONCLUSIONS:  1. Endocardial visualization enhanced with intravenous  injection of echo contrast (Definity). Mid to distal  anterior, mid to distal anterolateral, mid to distal  septal, distal inferior and apical hypokinesis. Severe  segmental left ventricular systolic dysfunction.  2. Normal right ventricular size. Distal right ventricle  hypokinesis.

## 2022-12-13 NOTE — PROGRESS NOTE ADULT - PROBLEM SELECTOR PLAN 1
- s/p JESUS to pLAD  - continue DAPT, atorvastatin  - continue metoprolol 25mg BID  - TTE EF 30%, severe LV systolic dysfunction, normal RV   - cardiac enzymes downtrending  - pleuritic chest pain, on high dose ASA and colchicine x 7 days for treatment of pericarditis  - continue Losartan 12.5mg PO daily  - repeat echo to r/o LV thrombus

## 2022-12-13 NOTE — DISCHARGE NOTE PROVIDER - NSDCMRMEDTOKEN_GEN_ALL_CORE_FT
Brilinta (ticagrelor) 90 mg oral tablet: 1 tab(s) orally 2 times a day   Janumet 50 mg-500 mg oral tablet: 1 tab(s) orally once a day   alcohol swabs : Apply topically to affected area 4 times a day   alcohol swabs : Apply topically to affected area 4 times a day   Brilinta (ticagrelor) 90 mg oral tablet: 1 tab(s) orally 2 times a day   glucometer (per patient&#x27;s insurance): Test blood sugars four times a day. Dispense #1 glucometer.  glucometer (per patient&#x27;s insurance): Test blood sugars four times a day. Dispense #1 glucometer.  Insulin Pen Needles, 4mm: 1 application subcutaneously 4 times a day. ** Use with insulin pen **   Janumet 50 mg-500 mg oral tablet: 1 tab(s) orally once a day  Jardiance 10 mg oral tablet: 1 tab(s) orally once a day     *price check - page [phone]*  lancets: 1 application subcutaneously 4 times a day   lancets: 1 application subcutaneously 4 times a day   Lantus Solostar Pen 100 units/mL subcutaneous solution: 26 unit(s) subcutaneous once a day (at bedtime)    *price check - page [phone]*   metFORMIN 500 mg oral tablet: 1 tab(s) orally 2 times a day     *price check - page [phone]*  test strips (per patient&#x27;s insurance): 1 application subcutaneously 4 times a day. ** Compatible with patient&#x27;s glucometer **  test strips (per patient&#x27;s insurance): 1 application subcutaneously 4 times a day. ** Compatible with patient&#x27;s glucometer **   alcohol swabs : Apply topically to affected area 4 times a day   alcohol swabs : Apply topically to affected area 4 times a day   Brilinta (ticagrelor) 90 mg oral tablet: 1 tab(s) orally 2 times a day   glucometer (per patient&#x27;s insurance): Test blood sugars four times a day. Dispense #1 glucometer.  glucometer (per patient&#x27;s insurance): Test blood sugars four times a day. Dispense #1 glucometer.  Insulin Pen Needles, 4mm: 1 application subcutaneously 4 times a day. ** Use with insulin pen **   Janumet 50 mg-500 mg oral tablet: 1 tab(s) orally once a day  Jardiance 10 mg oral tablet: 1 tab(s) orally once a day     *price check - page [phone]*  lancets: 1 application subcutaneously 4 times a day   lancets: 1 application subcutaneously 4 times a day   Lantus Solostar Pen 100 units/mL subcutaneous solution: 26 unit(s) subcutaneous once a day (at bedtime)    *price check - page [phone]*   losartan 25 mg oral tablet: 1 tab(s) orally once a day  metFORMIN 500 mg oral tablet: 1 tab(s) orally 2 times a day     *price check - page [phone]*  test strips (per patient&#x27;s insurance): 1 application subcutaneously 4 times a day. ** Compatible with patient&#x27;s glucometer **  test strips (per patient&#x27;s insurance): 1 application subcutaneously 4 times a day. ** Compatible with patient&#x27;s glucometer **   alcohol swabs : Apply topically to affected area 4 times a day   aspirin 81 mg oral tablet, chewable: 1 tab(s) orally once a day  atorvastatin 80 mg oral tablet: 1 tab(s) orally once a day (at bedtime)  clopidogrel 75 mg oral tablet: 1 tab(s) orally once a day  glucometer (per patient&#x27;s insurance): Test blood sugars four times a day. Dispense #1 glucometer.  Insulin Pen Needles, 4mm: 1 application subcutaneously 4 times a day. ** Use with insulin pen **   Jardiance 10 mg oral tablet: 1 tab(s) orally once a day     lancets: 1 application subcutaneously 4 times a day   losartan 25 mg oral tablet: 1 tab(s) orally once a day  metFORMIN 500 mg oral tablet: 1 tab(s) orally 2 times a day       metoprolol succinate 50 mg oral tablet, extended release: 1 tab(s) orally once a day  Semglee Prefilled Pen 100 units/mL subcutaneous solution: 26 unit(s) subcutaneous once a day (at bedtime)   test strips (per patient&#x27;s insurance): 1 application subcutaneously 4 times a day. ** Compatible with patient&#x27;s glucometer **  warfarin 5 mg oral tablet: 1 tab(s) orally once a day (in the evening)

## 2022-12-13 NOTE — CHART NOTE - NSCHARTNOTEFT_GEN_A_CORE
Reviewed TTE w/Definity result:     CONCLUSIONS:  Limited repeat study with the intravenous injection of  ultrasound enhancing agent to evaluate for left ventricular  thrombus.  Endocardium not well visualized; grossly  moderate to severe segmental left ventricular systolic  dysfunction.  Hypokinesis of the apex, mid to distal  septum, and mid to distal anterior wall.  Endocardial  visualization enhanced with intravenous injection of echo  contrast (Definity).  A filling defect is seen and is  consistent with laminar apical mural LV thrombus.  ------------------------------------------------------------------------  Confirmed on  12/13/2022 - 18:04:24 by Daniel Hurtado M.D.,  [facility], [facility]    Discussed with Interventional Cardiology service (spectra [id number]) - Start Heparin gtt full dose AC  Continue Brilinta and ASA as ordered    Attending MD Botello aware and in agreement with FIOR Tucker NP-BC  Medicine [id number]

## 2022-12-14 DIAGNOSIS — I51.3 INTRACARDIAC THROMBOSIS, NOT ELSEWHERE CLASSIFIED: ICD-10-CM

## 2022-12-14 LAB
ALBUMIN SERPL ELPH-MCNC: 3.7 G/DL — SIGNIFICANT CHANGE UP (ref 3.3–5)
ALP SERPL-CCNC: 55 U/L — SIGNIFICANT CHANGE UP (ref 40–120)
ALT FLD-CCNC: 49 U/L — HIGH (ref 4–41)
ANION GAP SERPL CALC-SCNC: 12 MMOL/L — SIGNIFICANT CHANGE UP (ref 7–14)
APTT BLD: 148 SEC — SIGNIFICANT CHANGE UP (ref 27–36.3)
APTT BLD: 51 SEC — HIGH (ref 27–36.3)
APTT BLD: 61.1 SEC — HIGH (ref 27–36.3)
APTT BLD: 62.1 SEC — HIGH (ref 27–36.3)
AST SERPL-CCNC: 49 U/L — HIGH (ref 4–40)
BASOPHILS # BLD AUTO: 0.04 K/UL — SIGNIFICANT CHANGE UP (ref 0–0.2)
BASOPHILS NFR BLD AUTO: 0.5 % — SIGNIFICANT CHANGE UP (ref 0–2)
BILIRUB SERPL-MCNC: 0.8 MG/DL — SIGNIFICANT CHANGE UP (ref 0.2–1.2)
BUN SERPL-MCNC: 19 MG/DL — SIGNIFICANT CHANGE UP (ref 7–23)
CALCIUM SERPL-MCNC: 9.7 MG/DL — SIGNIFICANT CHANGE UP (ref 8.4–10.5)
CHLORIDE SERPL-SCNC: 101 MMOL/L — SIGNIFICANT CHANGE UP (ref 98–107)
CO2 SERPL-SCNC: 27 MMOL/L — SIGNIFICANT CHANGE UP (ref 22–31)
CREAT SERPL-MCNC: 0.9 MG/DL — SIGNIFICANT CHANGE UP (ref 0.5–1.3)
EGFR: 110 ML/MIN/1.73M2 — SIGNIFICANT CHANGE UP
EOSINOPHIL # BLD AUTO: 0.07 K/UL — SIGNIFICANT CHANGE UP (ref 0–0.5)
EOSINOPHIL NFR BLD AUTO: 0.8 % — SIGNIFICANT CHANGE UP (ref 0–6)
GLUCOSE BLDC GLUCOMTR-MCNC: 109 MG/DL — HIGH (ref 70–99)
GLUCOSE BLDC GLUCOMTR-MCNC: 194 MG/DL — HIGH (ref 70–99)
GLUCOSE BLDC GLUCOMTR-MCNC: 237 MG/DL — HIGH (ref 70–99)
GLUCOSE BLDC GLUCOMTR-MCNC: 252 MG/DL — HIGH (ref 70–99)
GLUCOSE SERPL-MCNC: 230 MG/DL — HIGH (ref 70–99)
HCT VFR BLD CALC: 44.2 % — SIGNIFICANT CHANGE UP (ref 39–50)
HCT VFR BLD CALC: 49.5 % — SIGNIFICANT CHANGE UP (ref 39–50)
HGB BLD-MCNC: 14.6 G/DL — SIGNIFICANT CHANGE UP (ref 13–17)
HGB BLD-MCNC: 15.8 G/DL — SIGNIFICANT CHANGE UP (ref 13–17)
IANC: 4.8 K/UL — SIGNIFICANT CHANGE UP (ref 1.8–7.4)
IMM GRANULOCYTES NFR BLD AUTO: 3.2 % — HIGH (ref 0–0.9)
INR BLD: 1.13 RATIO — SIGNIFICANT CHANGE UP (ref 0.88–1.16)
LYMPHOCYTES # BLD AUTO: 2.49 K/UL — SIGNIFICANT CHANGE UP (ref 1–3.3)
LYMPHOCYTES # BLD AUTO: 28.7 % — SIGNIFICANT CHANGE UP (ref 13–44)
MAGNESIUM SERPL-MCNC: 2 MG/DL — SIGNIFICANT CHANGE UP (ref 1.6–2.6)
MCHC RBC-ENTMCNC: 31 PG — SIGNIFICANT CHANGE UP (ref 27–34)
MCHC RBC-ENTMCNC: 31.1 PG — SIGNIFICANT CHANGE UP (ref 27–34)
MCHC RBC-ENTMCNC: 31.9 GM/DL — LOW (ref 32–36)
MCHC RBC-ENTMCNC: 33 GM/DL — SIGNIFICANT CHANGE UP (ref 32–36)
MCV RBC AUTO: 93.8 FL — SIGNIFICANT CHANGE UP (ref 80–100)
MCV RBC AUTO: 97.4 FL — SIGNIFICANT CHANGE UP (ref 80–100)
MONOCYTES # BLD AUTO: 0.99 K/UL — HIGH (ref 0–0.9)
MONOCYTES NFR BLD AUTO: 11.4 % — SIGNIFICANT CHANGE UP (ref 2–14)
NEUTROPHILS # BLD AUTO: 4.8 K/UL — SIGNIFICANT CHANGE UP (ref 1.8–7.4)
NEUTROPHILS NFR BLD AUTO: 55.4 % — SIGNIFICANT CHANGE UP (ref 43–77)
NRBC # BLD: 0 /100 WBCS — SIGNIFICANT CHANGE UP (ref 0–0)
NRBC # BLD: 0 /100 WBCS — SIGNIFICANT CHANGE UP (ref 0–0)
NRBC # FLD: 0 K/UL — SIGNIFICANT CHANGE UP (ref 0–0)
NRBC # FLD: 0 K/UL — SIGNIFICANT CHANGE UP (ref 0–0)
PHOSPHATE SERPL-MCNC: 4.2 MG/DL — SIGNIFICANT CHANGE UP (ref 2.5–4.5)
PLATELET # BLD AUTO: 209 K/UL — SIGNIFICANT CHANGE UP (ref 150–400)
PLATELET # BLD AUTO: 218 K/UL — SIGNIFICANT CHANGE UP (ref 150–400)
POTASSIUM SERPL-MCNC: 4.3 MMOL/L — SIGNIFICANT CHANGE UP (ref 3.5–5.3)
POTASSIUM SERPL-SCNC: 4.3 MMOL/L — SIGNIFICANT CHANGE UP (ref 3.5–5.3)
PROT SERPL-MCNC: 6.8 G/DL — SIGNIFICANT CHANGE UP (ref 6–8.3)
PROTHROM AB SERPL-ACNC: 13.1 SEC — SIGNIFICANT CHANGE UP (ref 10.5–13.4)
RBC # BLD: 4.71 M/UL — SIGNIFICANT CHANGE UP (ref 4.2–5.8)
RBC # BLD: 5.08 M/UL — SIGNIFICANT CHANGE UP (ref 4.2–5.8)
RBC # FLD: 11.9 % — SIGNIFICANT CHANGE UP (ref 10.3–14.5)
RBC # FLD: 12 % — SIGNIFICANT CHANGE UP (ref 10.3–14.5)
SODIUM SERPL-SCNC: 140 MMOL/L — SIGNIFICANT CHANGE UP (ref 135–145)
WBC # BLD: 11.12 K/UL — HIGH (ref 3.8–10.5)
WBC # BLD: 8.67 K/UL — SIGNIFICANT CHANGE UP (ref 3.8–10.5)
WBC # FLD AUTO: 11.12 K/UL — HIGH (ref 3.8–10.5)
WBC # FLD AUTO: 8.67 K/UL — SIGNIFICANT CHANGE UP (ref 3.8–10.5)

## 2022-12-14 PROCEDURE — 99233 SBSQ HOSP IP/OBS HIGH 50: CPT

## 2022-12-14 RX ORDER — WARFARIN SODIUM 2.5 MG/1
5 TABLET ORAL ONCE
Refills: 0 | Status: COMPLETED | OUTPATIENT
Start: 2022-12-14 | End: 2022-12-14

## 2022-12-14 RX ORDER — INSULIN GLARGINE 100 [IU]/ML
26 INJECTION, SOLUTION SUBCUTANEOUS AT BEDTIME
Refills: 0 | Status: DISCONTINUED | OUTPATIENT
Start: 2022-12-14 | End: 2022-12-18

## 2022-12-14 RX ORDER — INSULIN LISPRO 100/ML
VIAL (ML) SUBCUTANEOUS
Refills: 0 | Status: DISCONTINUED | OUTPATIENT
Start: 2022-12-14 | End: 2022-12-18

## 2022-12-14 RX ORDER — INSULIN LISPRO 100/ML
8 VIAL (ML) SUBCUTANEOUS
Refills: 0 | Status: DISCONTINUED | OUTPATIENT
Start: 2022-12-14 | End: 2022-12-15

## 2022-12-14 RX ADMIN — Medication 8 UNIT(S): at 17:13

## 2022-12-14 RX ADMIN — Medication 4: at 08:17

## 2022-12-14 RX ADMIN — Medication 50 MILLIGRAM(S): at 11:55

## 2022-12-14 RX ADMIN — LOSARTAN POTASSIUM 12.5 MILLIGRAM(S): 100 TABLET, FILM COATED ORAL at 11:55

## 2022-12-14 RX ADMIN — HEPARIN SODIUM 1100 UNIT(S)/HR: 5000 INJECTION INTRAVENOUS; SUBCUTANEOUS at 07:27

## 2022-12-14 RX ADMIN — ATORVASTATIN CALCIUM 80 MILLIGRAM(S): 80 TABLET, FILM COATED ORAL at 21:04

## 2022-12-14 RX ADMIN — Medication 6: at 11:54

## 2022-12-14 RX ADMIN — Medication 0.6 MILLIGRAM(S): at 05:02

## 2022-12-14 RX ADMIN — Medication 7 UNIT(S): at 08:16

## 2022-12-14 RX ADMIN — WARFARIN SODIUM 5 MILLIGRAM(S): 2.5 TABLET ORAL at 21:08

## 2022-12-14 RX ADMIN — HEPARIN SODIUM 1300 UNIT(S)/HR: 5000 INJECTION INTRAVENOUS; SUBCUTANEOUS at 20:00

## 2022-12-14 RX ADMIN — TICAGRELOR 90 MILLIGRAM(S): 90 TABLET ORAL at 14:22

## 2022-12-14 RX ADMIN — Medication 81 MILLIGRAM(S): at 11:55

## 2022-12-14 RX ADMIN — INSULIN GLARGINE 26 UNIT(S): 100 INJECTION, SOLUTION SUBCUTANEOUS at 22:00

## 2022-12-14 RX ADMIN — TICAGRELOR 90 MILLIGRAM(S): 90 TABLET ORAL at 03:14

## 2022-12-14 RX ADMIN — CHLORHEXIDINE GLUCONATE 1 APPLICATION(S): 213 SOLUTION TOPICAL at 05:37

## 2022-12-14 RX ADMIN — HEPARIN SODIUM 0 UNIT(S)/HR: 5000 INJECTION INTRAVENOUS; SUBCUTANEOUS at 03:12

## 2022-12-14 RX ADMIN — HEPARIN SODIUM 1100 UNIT(S)/HR: 5000 INJECTION INTRAVENOUS; SUBCUTANEOUS at 04:15

## 2022-12-14 RX ADMIN — HEPARIN SODIUM 1300 UNIT(S)/HR: 5000 INJECTION INTRAVENOUS; SUBCUTANEOUS at 18:36

## 2022-12-14 RX ADMIN — Medication 7 UNIT(S): at 11:53

## 2022-12-14 RX ADMIN — Medication 0.6 MILLIGRAM(S): at 17:15

## 2022-12-14 RX ADMIN — HEPARIN SODIUM 1300 UNIT(S)/HR: 5000 INJECTION INTRAVENOUS; SUBCUTANEOUS at 11:59

## 2022-12-14 NOTE — PROVIDER CONTACT NOTE (CRITICAL VALUE NOTIFICATION) - ACTION/TREATMENT ORDERED:
Follow heparin nomogram. Will pause the heparin for ordered amount of time and then restart at new dose.

## 2022-12-14 NOTE — PROGRESS NOTE ADULT - ASSESSMENT
This is a 41 yo M /w a PMH of DM2 who present with chest pain and found to have a STEMI with 100% occlusion of proximal LAD c/b reduced ef /w EF 30% now s/p PCI and stenting. Endocrinology for DM2 management given persistent hyperglycemia.     1. DM 2 with hyperglycemia  Uncontrolled, A1c 10.1%  Home Regimen: None, was taking Janumet at some point in the past    While inpatient:  BG target 100-180 mg/dl   Focus titration on basal insulin. DC plan is basal/oral  Increase Lantus 27 units SQ qHS   Increase Admelog slightly to 8 units SQ TID before meals (Hold if NPO/skips meal)   Continue Admelog correctional scale MODERATE dose before meals, continue low dose at bedtime   Check BG before meals and bedtime  Consistent carbohydrate diet, RD consult done   Hypoglycemia protocol   DM Education: Please start education on insulin PEN (patient states he knows how to use, will need review), ensure patient completes return demonstration prior to discharge. Would also educate on glucometer and hypoglycemia recognition and treatment    Discharge Plan:   Recommend basal + oral regimen for discharge. Patient is agreeable  Patient will need new prescription for basal insulin PEN, options are: Lantus, Basaglar, Tresiba, Toujeo, Semglee   START Metformin 500 mg PO BID, increase to 1000 mg PO BID after 1 week (note, although he has CHF with reduced EF, he is currently stable with acceptable GFR and lactate levels. Ok to use Metformin)   START SGLT2i: Jardiance 10 mg PO daily OR Farxiga 5 mg PO daily (depending on insurance)- this will provide further glycemic control and also reduce complications and hospitalizations from CHF given low EF at 30%  STOP Janumet (per patient he was not recently taking)  Can consider GLP1RA as outpatient   In regards to SGLT2i, patient reports hx of kidney stone, no hx of UTI or genital mycotic infections  Please prescribe a new glucometer, glucose test strips, lancets, alcohol swabs and insulin PEN needles    Followup with Endocrine Practice at [address] [place] [address], [address], [place] [address]; Ph # [phone]    2. HTN  BP target less than 130/80  On Losartan and Metoprolol  Within target, continue to monitor     3. HLD  LDL target less than 70  , s/p STEMI  Continue Atorvastatin 80mg daily if no contraindications    Riya Walker  Nurse Practitioner  Division of Endocrinology & Diabetes  In house pager #[phone]/long range pager #[phone]    If before 9AM or after 6PM, or on weekends/holidays, please call endocrine answering service for assistance ([phone]).  For nonurgent matters email [email or web address] for assistance.    Greater than 35 minutes spent in assessment, coordination of care and education for uncontrolled DM 2 with greater than 50% in face to face education with patient   This is a 43 yo M /w a PMH of DM2 who present with chest pain and found to have a STEMI with 100% occlusion of proximal LAD c/b reduced ef /w EF 30% now s/p PCI and stenting. Endocrinology for DM2 management given persistent hyperglycemia.     1. DM 2 with hyperglycemia  Uncontrolled, A1c 10.1%  Home Regimen: None, was taking Janumet at some point in the past    While inpatient:  BG target 100-180 mg/dl   Focus titration on basal insulin. DC plan is basal/oral  Increase Lantus 26 units SQ qHS   Increase Admelog slightly to 8 units SQ TID before meals (Hold if NPO/skips meal)   Continue Admelog correctional scale MODERATE dose before meals, continue low dose at bedtime   Check BG before meals and bedtime  Consistent carbohydrate diet, RD consult done   Hypoglycemia protocol   DM Education: Please start education on insulin PEN (patient states he knows how to use, will need review), ensure patient completes return demonstration prior to discharge. Would also educate on glucometer and hypoglycemia recognition and treatment    Discharge Plan:   Recommend basal + oral regimen for discharge. Patient is agreeable  Patient will need new prescription for basal insulin PEN, options are: Lantus, Basaglar, Tresiba, Toujeo, Semglee   START Metformin 500 mg PO BID, increase to 1000 mg PO BID after 1 week (note, although he has CHF with reduced EF, he is currently stable with acceptable GFR and lactate levels. Ok to use Metformin)   START SGLT2i: Jardiance 10 mg PO daily OR Farxiga 5 mg PO daily (depending on insurance)- this will provide further glycemic control and also reduce complications and hospitalizations from CHF given low EF at 30%  STOP Janumet (per patient he was not recently taking)  Can consider GLP1RA as outpatient   In regards to SGLT2i, patient reports hx of kidney stone, no hx of UTI or genital mycotic infections  Please prescribe a new glucometer, glucose test strips, lancets, alcohol swabs and insulin PEN needles    Followup with Endocrine Practice at [address] [place] [address], [address], [place] [address]; Ph # [phone]    2. HTN  BP target less than 130/80  On Losartan and Metoprolol  Within target, continue to monitor     3. HLD  LDL target less than 70  , s/p STEMI  Continue Atorvastatin 80mg daily if no contraindications    Riya Walker  Nurse Practitioner  Division of Endocrinology & Diabetes  In house pager #[phone]/long range pager #[phone]    If before 9AM or after 6PM, or on weekends/holidays, please call endocrine answering service for assistance ([phone]).  For nonurgent matters email [email or web address] for assistance.    Greater than 35 minutes spent in assessment, coordination of care and education for uncontrolled DM 2 with greater than 50% in face to face education with patient   This is a 41 yo M /w a PMH of DM2 who present with chest pain and found to have a STEMI with 100% occlusion of proximal LAD c/b reduced ef /w EF 30% now s/p PCI and stenting. Endocrinology for DM2 management given persistent hyperglycemia.     1. DM 2 with hyperglycemia  Uncontrolled, A1c 10.1%  Home Regimen: None, was taking Janumet at some point in the past    While inpatient:  BG target 100-180 mg/dl   Focus titration on basal insulin. DC plan is basal/oral  Increase Lantus 26 units SQ qHS   Increase Admelog slightly to 8 units SQ TID before meals (Hold if NPO/skips meal)   Adjust back to Admelog LOW dose scale before meals, continue low dose at bedtime   Check BG before meals and bedtime  Consistent carbohydrate diet, RD consult done   Hypoglycemia protocol   DM Education: Please start education on insulin PEN (patient states he knows how to use, will need review), ensure patient completes return demonstration prior to discharge. Would also educate on glucometer and hypoglycemia recognition and treatment    Discharge Plan:   Recommend basal + oral regimen for discharge. Patient is agreeable  Patient will need new prescription for basal insulin PEN, options are: Lantus, Basaglar, Tresiba, Toujeo, Semglee   START Metformin 500 mg PO BID, increase to 1000 mg PO BID after 1 week (note, although he has CHF with reduced EF, he is currently stable with acceptable GFR and lactate levels. Ok to use Metformin)   START SGLT2i: Jardiance 10 mg PO daily OR Farxiga 5 mg PO daily (depending on insurance)- this will provide further glycemic control and also reduce complications and hospitalizations from CHF given low EF at 30%  STOP Janumet (per patient he was not recently taking)  Can consider GLP1RA as outpatient   In regards to SGLT2i, patient reports hx of kidney stone, no hx of UTI or genital mycotic infections  Please prescribe a new glucometer, glucose test strips, lancets, alcohol swabs and insulin PEN needles    Followup with Endocrine Practice at [address] [place] [address], [address], [place] [address]; Ph # [phone]    2. HTN  BP target less than 130/80  On Losartan and Metoprolol  Within target, continue to monitor     3. HLD  LDL target less than 70  , s/p STEMI  Continue Atorvastatin 80mg daily if no contraindications    Riya Walker  Nurse Practitioner  Division of Endocrinology & Diabetes  In house pager #[phone]/long range pager #[phone]    If before 9AM or after 6PM, or on weekends/holidays, please call endocrine answering service for assistance ([phone]).  For nonurgent matters email [email or web address] for assistance.    Greater than 35 minutes spent in assessment, coordination of care and education for uncontrolled DM 2 with greater than 50% in face to face education with patient

## 2022-12-14 NOTE — PROGRESS NOTE ADULT - SUBJECTIVE AND OBJECTIVE BOX
Subjective/Objective: pt reports feeling better    Tele event: sr/st AT     MEDICATIONS    aspirin  chewable 81 milliGRAM(s) Oral daily  heparin   Injectable 6000 Unit(s) IV Push every 6 hours PRN  heparin   Injectable 3000 Unit(s) IV Push every 6 hours PRN  heparin  Infusion.  Unit(s)/Hr IV Continuous <Continuous>  losartan 12.5 milliGRAM(s) Oral daily  metoprolol succinate ER 50 milliGRAM(s) Oral daily  ticagrelor 90 milliGRAM(s) Oral every 12 hours  acetaminophen     Tablet .. 650 milliGRAM(s) Oral every 6 hours PRN  atorvastatin 80 milliGRAM(s) Oral at bedtime  colchicine 0.6 milliGRAM(s) Oral every 12 hours  dextrose 50% Injectable 25 Gram(s) IV Push once  dextrose 50% Injectable 12.5 Gram(s) IV Push once  dextrose 50% Injectable 25 Gram(s) IV Push once  dextrose Oral Gel 15 Gram(s) Oral once PRN  glucagon  Injectable 1 milliGRAM(s) IntraMuscular once  insulin glargine Injectable (LANTUS) 22 Unit(s) SubCutaneous at bedtime  insulin lispro (ADMELOG) corrective regimen sliding scale   SubCutaneous at bedtime  insulin lispro (ADMELOG) corrective regimen sliding scale   SubCutaneous three times a day before meals  insulin lispro Injectable (ADMELOG) 7 Unit(s) SubCutaneous three times a day before meals    chlorhexidine 2% Cloths 1 Application(s) Topical <User Schedule>  dextrose 5%. 1000 milliLiter(s) IV Continuous <Continuous>  dextrose 5%. 1000 milliLiter(s) IV Continuous <Continuous>  influenza   Vaccine 0.5 milliLiter(s) IntraMuscular once          REVIEW OF SYSTEMS    General: no fatigue/malaise, weight loss/gain.  Skin: no rashes.  Ophthalmologic: no blurred vision, no loss of vision. 	  ENT: no sore throat, rhinorrhea, sinus congestion.  Cardiovascular:no chest pain ,no palpitation,no dizziness,no diaphoresis,no edema  Respiratory: no SOB, cough or wheeze.  Gastrointestinal:  no N/V/D, no melena/hematemesis/hematochezia.  Genitourinary: no dysuria/hesitancy or hematuria.  Musculoskeletal: no myalgias or arthralgias.  Neurological: no changes in vision or hearing, no lightheadedness/dizziness, no syncope/near syncope	  Psychiatric: no unusual stress/anxiety      	    ICU Vital Signs Last 24 Hrs  T(C): 36.7 (14 Dec 2022 05:02), Max: 37.6 (13 Dec 2022 20:14)  T(F): 98 (14 Dec 2022 05:02), Max: 99.7 (13 Dec 2022 20:14)  HR: 95 (14 Dec 2022 05:02) (95 - 111)  BP: 98/67 (14 Dec 2022 05:02) (97/69 - 122/82)  RR: 18 (14 Dec 2022 05:02) (16 - 18)  SpO2: 99% (14 Dec 2022 05:02) (96% - 100%)    O2 Parameters below as of 14 Dec 2022 05:02  Patient On (Oxygen Delivery Method): room air            PHYSICAL EXAMINATION  Appearance: NAD, no distress  HEENT: Moist Mucous Membranes, Anicteric, PERRL, EOMI  Cardiovascular: Regular rate and rhythm, Normal S1 S2, No JVD, No murmurs  Respiratory: Lungs clear to auscultation. No rales, No rhonchi, No wheezing. No tenderness to palpation  Gastrointestinal:  Soft, Non-tender, + BS  Neurologic: Non-focal, A&Ox3  Skin: Warm and dry, No rashes, No ecchymosis, No cyanosis  Musculoskeletal: No clubbing, No cyanosis, No edema  Vascular: Peripheral pulses palpable 2+ bilaterally  Psychiatry: Mood & affect appropriate      	    		      I&O's Summary    13 Dec 2022 07:01  -  14 Dec 2022 07:00  --------------------------------------------------------  IN: 0 mL / OUT: 950 mL / NET: -950 mL    	     LABS:	  LABORATORY VALUES	 	                          15.8   8.67  )-----------( 209      ( 14 Dec 2022 06:40 )             49.5       12-14    140  |  101  |  19  ----------------------------<  230<H>  4.3   |  27  |  0.90  12-13    132<L>  |  98  |  13  ----------------------------<  303<H>  4.4   |  24  |  0.71    Ca    9.7      14 Dec 2022 06:40  Ca    9.4      13 Dec 2022 06:17  Phos  4.2     12-14  Phos  3.0     12-13  Mg     2.00     12-14  Mg     1.80     12-13    TPro  6.8  /  Alb  3.7  /  TBili  0.8  /  DBili  x   /  AST  49<H>  /  ALT  49<H>  /  AlkPhos  55  12-14  TPro  6.5  /  Alb  3.7  /  TBili  1.0  /  DBili  <0.2  /  AST  79<H>  /  ALT  63<H>  /  AlkPhos  52  12-13    LIVER FUNCTIONS - ( 14 Dec 2022 06:40 )  Alb: 3.7 g/dL / Pro: 6.8 g/dL / ALK PHOS: 55 U/L / ALT: 49 U/L / AST: 49 U/L / GGT: x           Prothrombin Time, Plasma: 13.1 sec (12-14 @ 06:40)      CARDIAC MARKERS:  Creatine Kinase, Serum: 1628 U/L (12-12 @ 06:29)  Creatine Kinase, Serum: 1612 U/L (12-12 @ 05:50)  Creatine Kinase, Serum: 2146 U/L (12-11 @ 22:52)          Serum Pro-Brain Natriuretic Peptide: 562 pg/mL (12-11 @ 01:20)    Lactate, Blood: 1.7 mmol/L (12-11 @ 22:52)    12-11 @ 04:27  Cholesterol, Serum - 192  Direct LDL- --  HDL Cholesterol, Serum- 60  Triglycerides, Serum- 53      Thyroid Stimulating Hormone, Serum: 0.60 uIU/mL (12-11 @ 04:27)        CAPILLARY BLOOD GLUCOSE      POCT Blood Glucose.: 237 mg/dL (14 Dec 2022 07:44)      12-12 @ 02:02  229E Corona Virus --  Adenovirus NotDetec  Bordetella pertussis NotDetec  Chlamydia pneumoniae NotDetec  Entero/Rhino Virus NotDetec  HKU1 Coronavirus --  hMPV NotDetec  Influenza A NotDetec  Influenza AH1 --  Influenza AH1 2009 --  Influenza AH3 --  Influenza B NotDetec  Mycoplasma pneumoniae NotDete  NL63 Coronavirus --  OC43 Corornavirus --  Parainfluenza 1 NotDetec  Parainfluenza 2 NotDetec          Diagnostic testing:  cath:< from: Cardiac Catheterization (12.11.22 @ 01:56) >  Diagnostic Findings:   Coronary Angiography   The coronary circulation is right dominant.          Left main artery: Angiography shows minor irregularities.      LAD   Proximal left anterior descending: A large thrombosis is present.There is a 100 % stenosis. First diagonal: There is a 100 %  stenosis.      CX   Proximal circumflex: There is a 50 % stenosis.      RCA   Proximal right coronary artery: There is a 60 % stenosis. Mid rightcoronary artery: There is a 50 % stenosis.    Left Heart Cath   The anterolateral and apical wall segments are akinetic. Ejectionfraction is 25%.      Late presentation anterior wall MI - STEMI. EF 25%. Culprit lesion in  the pLAD and large diagonal. - s/p POBA to the diagonal      echo:< from: TTE with Doppler (w/Cont) (12.13.22 @ 16:48) >  Limited repeat study with the intravenous injection of  ultrasound enhancing agent to evaluate for left ventricular  thrombus.  Endocardium not well visualized; grossly  moderate to severe segmental left ventricular systolic  dysfunction.  Hypokinesis of the apex, mid to distal  septum, and mid to distal anterior wall.  Endocardial  visualization enhanced with intravenous injection of echo  contrast (Definity).  A filling defect is seen and is  consistent with laminar apical mural LV thrombus.    < end of copied text >        Assessment and Plan:41 y/o male [place] male with h/o smoking and pre DM2 presents with chest pain - + AWSTEMI and now s/p JESUS to pLAD and POBA to DIAG.     AWSTEMI  - s/p JESUS to pLAD  - continue DAPT, Lipitor  -continue  Toprol 50mg xl daily   - TTE EF 30%, severe LV systolic dysfunction, normal RV   - pleuritic chest pain, c/w  colchicine x 7 days  - Continue Losartan 12.5mg Po daily  - repeat echo showed  LV thrombus- on heparin drip to Coumadin  - daily INR check and dose coumadin daily, goal INR 2-3    Dispo  d/c home when INR therapeutic and  follow up with Dr. Smyth 12/23 at 11:30pm

## 2022-12-14 NOTE — PROGRESS NOTE ADULT - SUBJECTIVE AND OBJECTIVE BOX
[facility] Division of Hospital Medicine  Ameena Salguero MD  Available via MS Teams    SUBJECTIVE / OVERNIGHT EVENTS: TTE last night demonstrated LV thrombus. Pt started on heparin gtt and dosed coumadin. Today pt reports that he coughed and had mild strain pain in his right groin which has since improved. Denies swelling, tenderness, numbness, tingling in the RLE. Denies chest pain, shortness of breath, sore throat    ADDITIONAL REVIEW OF SYSTEMS: Denies fevers, chills, chest pain, abd pain, n/v/d    MEDICATIONS  (STANDING):  aspirin  chewable 81 milliGRAM(s) Oral daily  atorvastatin 80 milliGRAM(s) Oral at bedtime  chlorhexidine 2% Cloths 1 Application(s) Topical <User Schedule>  colchicine 0.6 milliGRAM(s) Oral every 12 hours  dextrose 5%. 1000 milliLiter(s) (100 mL/Hr) IV Continuous <Continuous>  dextrose 5%. 1000 milliLiter(s) (50 mL/Hr) IV Continuous <Continuous>  dextrose 50% Injectable 25 Gram(s) IV Push once  dextrose 50% Injectable 12.5 Gram(s) IV Push once  dextrose 50% Injectable 25 Gram(s) IV Push once  glucagon  Injectable 1 milliGRAM(s) IntraMuscular once  heparin  Infusion.  Unit(s)/Hr (13 mL/Hr) IV Continuous <Continuous>  influenza   Vaccine 0.5 milliLiter(s) IntraMuscular once  insulin glargine Injectable (LANTUS) 22 Unit(s) SubCutaneous at bedtime  insulin lispro (ADMELOG) corrective regimen sliding scale   SubCutaneous at bedtime  insulin lispro (ADMELOG) corrective regimen sliding scale   SubCutaneous three times a day before meals  insulin lispro Injectable (ADMELOG) 7 Unit(s) SubCutaneous three times a day before meals  losartan 12.5 milliGRAM(s) Oral daily  metoprolol succinate ER 50 milliGRAM(s) Oral daily  ticagrelor 90 milliGRAM(s) Oral every 12 hours    MEDICATIONS  (PRN):  acetaminophen     Tablet .. 650 milliGRAM(s) Oral every 6 hours PRN Mild Pain (1 - 3), Moderate Pain (4 - 6)  dextrose Oral Gel 15 Gram(s) Oral once PRN Blood Glucose LESS THAN 70 milliGRAM(s)/deciliter  heparin   Injectable 6000 Unit(s) IV Push every 6 hours PRN For aPTT less than 40  heparin   Injectable 3000 Unit(s) IV Push every 6 hours PRN For aPTT between 40 - 57      I&O's Summary    13 Dec 2022 07:01  -  14 Dec 2022 07:00  --------------------------------------------------------  IN: 0 mL / OUT: 950 mL / NET: -950 mL        PHYSICAL EXAM:  Vital Signs Last 24 Hrs  T(C): 36.7 (14 Dec 2022 05:02), Max: 37.6 (13 Dec 2022 20:14)  T(F): 98 (14 Dec 2022 05:02), Max: 99.7 (13 Dec 2022 20:14)  HR: 95 (14 Dec 2022 05:02) (95 - 111)  BP: 98/67 (14 Dec 2022 05:02) (97/69 - 122/82)  BP(mean): --  RR: 18 (14 Dec 2022 05:02) (16 - 18)  SpO2: 99% (14 Dec 2022 05:02) (96% - 100%)    Parameters below as of 14 Dec 2022 05:02  Patient On (Oxygen Delivery Method): room air      CONSTITUTIONAL: NAD, well-developed, well-groomed  EYES: PERRLA; conjunctiva and sclera clear  ENMT: Moist oral mucosa, no pharyngeal injection or exudates; normal dentition  NECK: Supple, no palpable masses; no thyromegaly  RESPIRATORY: Normal respiratory effort; lungs are clear to auscultation bilaterally  CARDIOVASCULAR: Regular rate and rhythm, normal S1 and S2, no murmur/rub/gallop; No lower extremity edema; Peripheral pulses are 2+ bilaterally  ABDOMEN: Nontender to palpation, normoactive bowel sounds, no rebound/guarding; No hepatosplenomegaly  MUSCULOSKELETAL:  Normal gait; no clubbing or cyanosis of digits; no joint swelling or tenderness to palpation; right groin without tenderness, pulsating mass  PSYCH: A+O to person, place, and time; affect appropriate  NEUROLOGY: CN 2-12 are intact and symmetric; no gross sensory deficits   SKIN: No rashes; no palpable lesions    LABS:                        15.8   8.67  )-----------( 209      ( 14 Dec 2022 06:40 )             49.5     Hb Trend: 15.8<--, 14.6<--, 14.9<--, 16.3<--, 16.0<--  12-14    140  |  101  |  19  ----------------------------<  230<H>  4.3   |  27  |  0.90    Ca    9.7      14 Dec 2022 06:40  Phos  4.2     12-14  Mg     2.00     12-14    TPro  6.8  /  Alb  3.7  /  TBili  0.8  /  DBili  x   /  AST  49<H>  /  ALT  49<H>  /  AlkPhos  55  12-14    Creatinine Trend: 0.90<--, 0.71<--, 0.77<--, 0.86<--, 0.62<--, 0.81<--  PT/INR - ( 14 Dec 2022 06:40 )   PT: 13.1 sec;   INR: 1.13 ratio         PTT - ( 14 Dec 2022 06:40 )  PTT:62.1 sec          Culture - Blood (collected 11 Dec 2022 23:59)  Source: .Blood Blood-Peripheral  Preliminary Report (13 Dec 2022 04:02):    No growth to date.    Culture - Blood (collected 11 Dec 2022 22:42)  Source: .Blood Blood-Peripheral  Preliminary Report (13 Dec 2022 04:02):    No growth to date.      SARS-CoV-2: NotDetec (12 Dec 2022 02:02)      RADIOLOGY & ADDITIONAL TESTS:  New Imaging Personally Reviewed Today: TTEwith LV thrombus  Tele: Sinus 80s-100s    COORDINATION OF CARE:  Consultant Communication and Details of Discussion (where applicable): Cardiology, bridge to coumadin for LV thrombus

## 2022-12-14 NOTE — PROGRESS NOTE ADULT - SUBJECTIVE AND OBJECTIVE BOX
Chief Complaint: DM 2 with hyperglycemia     History: Patient seen at bedside. Reports he is eating meals, denies n/v, denies s/s of hypoglycemia  Patient reports understanding of diabetes discharge plan: Basal insulin + oral regimen  Patient states he has used insulin PENS in the past, temporarily took insulin 3 years ago. At that time, had been aiming for improved glycemic control for outpatient procedure to be completed (kidney stone removal)  Reviewed A1c and BG targets, hypoglycemia recognition and treatment, insulin PEN use, reviewed oral agents and benefit/side effect profiles, consistent carbohydrate diet, importance of followup. Patient reports understanding  Patient states he practiced insulin self injection, was able to successfully return demonstrate insulin PEN use with NP     MEDICATIONS  (STANDING):  aspirin  chewable 81 milliGRAM(s) Oral daily  atorvastatin 80 milliGRAM(s) Oral at bedtime  chlorhexidine 2% Cloths 1 Application(s) Topical <User Schedule>  colchicine 0.6 milliGRAM(s) Oral every 12 hours  dextrose 5%. 1000 milliLiter(s) (100 mL/Hr) IV Continuous <Continuous>  dextrose 5%. 1000 milliLiter(s) (50 mL/Hr) IV Continuous <Continuous>  dextrose 50% Injectable 25 Gram(s) IV Push once  dextrose 50% Injectable 12.5 Gram(s) IV Push once  dextrose 50% Injectable 25 Gram(s) IV Push once  glucagon  Injectable 1 milliGRAM(s) IntraMuscular once  heparin  Infusion.  Unit(s)/Hr (13 mL/Hr) IV Continuous <Continuous>  influenza   Vaccine 0.5 milliLiter(s) IntraMuscular once  insulin glargine Injectable (LANTUS) 22 Unit(s) SubCutaneous at bedtime  insulin lispro (ADMELOG) corrective regimen sliding scale   SubCutaneous at bedtime  insulin lispro (ADMELOG) corrective regimen sliding scale   SubCutaneous three times a day before meals  insulin lispro Injectable (ADMELOG) 7 Unit(s) SubCutaneous three times a day before meals  losartan 12.5 milliGRAM(s) Oral daily  metoprolol succinate ER 50 milliGRAM(s) Oral daily  ticagrelor 90 milliGRAM(s) Oral every 12 hours    MEDICATIONS  (PRN):  acetaminophen     Tablet .. 650 milliGRAM(s) Oral every 6 hours PRN Mild Pain (1 - 3), Moderate Pain (4 - 6)  dextrose Oral Gel 15 Gram(s) Oral once PRN Blood Glucose LESS THAN 70 milliGRAM(s)/deciliter  heparin   Injectable 6000 Unit(s) IV Push every 6 hours PRN For aPTT less than 40  heparin   Injectable 3000 Unit(s) IV Push every 6 hours PRN For aPTT between 40 - 57    No Known Allergies    Review of Systems:  Cardiovascular: No chest pain  Respiratory: No SOB  GI: No nausea, vomiting  Endocrine: no hypoglycemia     PHYSICAL EXAM:  VITALS: T(C): 37.3 (12-14-22 @ 12:06)  T(F): 99.2 (12-14-22 @ 12:06), Max: 99.7 (12-13-22 @ 20:14)  HR: 103 (12-14-22 @ 12:06) (95 - 111)  BP: 125/79 (12-14-22 @ 12:06) (97/69 - 125/79)  RR:  (18 - 18)  SpO2:  (96% - 100%)  Wt(kg): --  GENERAL: NAD  EYES: No proptosis, no lid lag, anicteric  HEENT:  Atraumatic, Normocephalic, moist mucous membranes  RESPIRATORY: unlabored respirations   PSYCH: Alert and oriented x 3, normal affect, normal mood    CAPILLARY BLOOD GLUCOSE    POCT Blood Glucose.: 252 mg/dL (14 Dec 2022 11:48)  POCT Blood Glucose.: 237 mg/dL (14 Dec 2022 07:44)  POCT Blood Glucose.: 121 mg/dL (13 Dec 2022 21:08)  POCT Blood Glucose.: 116 mg/dL (13 Dec 2022 18:13)      12-14    140  |  101  |  19  ----------------------------<  230<H>  4.3   |  27  |  0.90    eGFR: 110    Ca    9.7      12-14  Mg     2.00     12-14  Phos  4.2     12-14    TPro  6.8  /  Alb  3.7  /  TBili  0.8  /  DBili  x   /  AST  49<H>  /  ALT  49<H>  /  AlkPhos  55  12-14      Thyroid Function Tests:  12-11 @ 04:27 TSH 0.60 FreeT4 -- T3 -- Anti TPO -- Anti Thyroglobulin Ab -- TSI --  12-11 @ 01:20 TSH 1.16 FreeT4 -- T3 -- Anti TPO -- Anti Thyroglobulin Ab -- TSI --      A1C with Estimated Average Glucose Result: 10.1 % (12-11-22 @ 04:27)  A1C with Estimated Average Glucose Result: 10.2 % (12-11-22 @ 01:20)    Diet, DASH/TLC:   Sodium & Cholesterol Restricted  Consistent Carbohydrate No Snacks (CSTCHO)     Special Instructions for Nursing:  Sodium & Cholesterol Restricted (12-11-22 @ 08:12) [Active]

## 2022-12-14 NOTE — PROGRESS NOTE ADULT - PROBLEM SELECTOR PLAN 1
Repeat echo demonstrating LV thrombus  - pt on heparin gtt with bridge to coumadin  - dose coumadin 5mg tonight  - check INR daily

## 2022-12-14 NOTE — PROGRESS NOTE ADULT - ASSESSMENT
42 y/p [place] male with h/o smoking and pre DM2 presents with chest pain - + AWSTEMI and now s/p EJSUS to pLAD and POBA to diag, course complicated by post-STEMI pericarditis and LV thrombus

## 2022-12-15 LAB
ALBUMIN SERPL ELPH-MCNC: 3.9 G/DL — SIGNIFICANT CHANGE UP (ref 3.3–5)
ALP SERPL-CCNC: 62 U/L — SIGNIFICANT CHANGE UP (ref 40–120)
ALT FLD-CCNC: 61 U/L — HIGH (ref 4–41)
ANION GAP SERPL CALC-SCNC: 10 MMOL/L — SIGNIFICANT CHANGE UP (ref 7–14)
APTT BLD: 75.3 SEC — HIGH (ref 27–36.3)
APTT BLD: 76 SEC — HIGH (ref 27–36.3)
AST SERPL-CCNC: 52 U/L — HIGH (ref 4–40)
BASOPHILS # BLD AUTO: 0.05 K/UL — SIGNIFICANT CHANGE UP (ref 0–0.2)
BASOPHILS NFR BLD AUTO: 0.5 % — SIGNIFICANT CHANGE UP (ref 0–2)
BILIRUB SERPL-MCNC: 0.8 MG/DL — SIGNIFICANT CHANGE UP (ref 0.2–1.2)
BUN SERPL-MCNC: 13 MG/DL — SIGNIFICANT CHANGE UP (ref 7–23)
CALCIUM SERPL-MCNC: 9.5 MG/DL — SIGNIFICANT CHANGE UP (ref 8.4–10.5)
CHLORIDE SERPL-SCNC: 100 MMOL/L — SIGNIFICANT CHANGE UP (ref 98–107)
CO2 SERPL-SCNC: 27 MMOL/L — SIGNIFICANT CHANGE UP (ref 22–31)
CREAT SERPL-MCNC: 0.92 MG/DL — SIGNIFICANT CHANGE UP (ref 0.5–1.3)
EGFR: 107 ML/MIN/1.73M2 — SIGNIFICANT CHANGE UP
EOSINOPHIL # BLD AUTO: 0.12 K/UL — SIGNIFICANT CHANGE UP (ref 0–0.5)
EOSINOPHIL NFR BLD AUTO: 1.3 % — SIGNIFICANT CHANGE UP (ref 0–6)
GLUCOSE BLDC GLUCOMTR-MCNC: 138 MG/DL — HIGH (ref 70–99)
GLUCOSE BLDC GLUCOMTR-MCNC: 202 MG/DL — HIGH (ref 70–99)
GLUCOSE BLDC GLUCOMTR-MCNC: 233 MG/DL — HIGH (ref 70–99)
GLUCOSE BLDC GLUCOMTR-MCNC: 234 MG/DL — HIGH (ref 70–99)
GLUCOSE SERPL-MCNC: 140 MG/DL — HIGH (ref 70–99)
HCT VFR BLD CALC: 49.6 % — SIGNIFICANT CHANGE UP (ref 39–50)
HGB BLD-MCNC: 16.1 G/DL — SIGNIFICANT CHANGE UP (ref 13–17)
IANC: 5.68 K/UL — SIGNIFICANT CHANGE UP (ref 1.8–7.4)
IMM GRANULOCYTES NFR BLD AUTO: 2.1 % — HIGH (ref 0–0.9)
INR BLD: 1.21 RATIO — HIGH (ref 0.88–1.16)
LYMPHOCYTES # BLD AUTO: 2.36 K/UL — SIGNIFICANT CHANGE UP (ref 1–3.3)
LYMPHOCYTES # BLD AUTO: 25.2 % — SIGNIFICANT CHANGE UP (ref 13–44)
MAGNESIUM SERPL-MCNC: 1.9 MG/DL — SIGNIFICANT CHANGE UP (ref 1.6–2.6)
MCHC RBC-ENTMCNC: 30.8 PG — SIGNIFICANT CHANGE UP (ref 27–34)
MCHC RBC-ENTMCNC: 32.5 GM/DL — SIGNIFICANT CHANGE UP (ref 32–36)
MCV RBC AUTO: 95 FL — SIGNIFICANT CHANGE UP (ref 80–100)
MONOCYTES # BLD AUTO: 0.96 K/UL — HIGH (ref 0–0.9)
MONOCYTES NFR BLD AUTO: 10.2 % — SIGNIFICANT CHANGE UP (ref 2–14)
NEUTROPHILS # BLD AUTO: 5.68 K/UL — SIGNIFICANT CHANGE UP (ref 1.8–7.4)
NEUTROPHILS NFR BLD AUTO: 60.7 % — SIGNIFICANT CHANGE UP (ref 43–77)
NRBC # BLD: 0 /100 WBCS — SIGNIFICANT CHANGE UP (ref 0–0)
NRBC # FLD: 0 K/UL — SIGNIFICANT CHANGE UP (ref 0–0)
PHOSPHATE SERPL-MCNC: 3.8 MG/DL — SIGNIFICANT CHANGE UP (ref 2.5–4.5)
PLATELET # BLD AUTO: 237 K/UL — SIGNIFICANT CHANGE UP (ref 150–400)
POTASSIUM SERPL-MCNC: 4.7 MMOL/L — SIGNIFICANT CHANGE UP (ref 3.5–5.3)
POTASSIUM SERPL-SCNC: 4.7 MMOL/L — SIGNIFICANT CHANGE UP (ref 3.5–5.3)
PROT SERPL-MCNC: 7 G/DL — SIGNIFICANT CHANGE UP (ref 6–8.3)
PROTHROM AB SERPL-ACNC: 14.1 SEC — HIGH (ref 10.5–13.4)
RBC # BLD: 5.22 M/UL — SIGNIFICANT CHANGE UP (ref 4.2–5.8)
RBC # FLD: 12 % — SIGNIFICANT CHANGE UP (ref 10.3–14.5)
SODIUM SERPL-SCNC: 137 MMOL/L — SIGNIFICANT CHANGE UP (ref 135–145)
WBC # BLD: 9.37 K/UL — SIGNIFICANT CHANGE UP (ref 3.8–10.5)
WBC # FLD AUTO: 9.37 K/UL — SIGNIFICANT CHANGE UP (ref 3.8–10.5)

## 2022-12-15 PROCEDURE — 99232 SBSQ HOSP IP/OBS MODERATE 35: CPT

## 2022-12-15 PROCEDURE — 99233 SBSQ HOSP IP/OBS HIGH 50: CPT

## 2022-12-15 RX ORDER — WARFARIN SODIUM 2.5 MG/1
7.5 TABLET ORAL ONCE
Refills: 0 | Status: COMPLETED | OUTPATIENT
Start: 2022-12-15 | End: 2022-12-15

## 2022-12-15 RX ORDER — LOSARTAN POTASSIUM 100 MG/1
25 TABLET, FILM COATED ORAL DAILY
Refills: 0 | Status: DISCONTINUED | OUTPATIENT
Start: 2022-12-15 | End: 2022-12-18

## 2022-12-15 RX ORDER — INSULIN LISPRO 100/ML
10 VIAL (ML) SUBCUTANEOUS
Refills: 0 | Status: DISCONTINUED | OUTPATIENT
Start: 2022-12-15 | End: 2022-12-16

## 2022-12-15 RX ADMIN — CHLORHEXIDINE GLUCONATE 1 APPLICATION(S): 213 SOLUTION TOPICAL at 06:25

## 2022-12-15 RX ADMIN — LOSARTAN POTASSIUM 25 MILLIGRAM(S): 100 TABLET, FILM COATED ORAL at 12:16

## 2022-12-15 RX ADMIN — INSULIN GLARGINE 26 UNIT(S): 100 INJECTION, SOLUTION SUBCUTANEOUS at 21:16

## 2022-12-15 RX ADMIN — Medication 0.6 MILLIGRAM(S): at 16:00

## 2022-12-15 RX ADMIN — TICAGRELOR 90 MILLIGRAM(S): 90 TABLET ORAL at 00:21

## 2022-12-15 RX ADMIN — HEPARIN SODIUM 1300 UNIT(S)/HR: 5000 INJECTION INTRAVENOUS; SUBCUTANEOUS at 07:14

## 2022-12-15 RX ADMIN — LOSARTAN POTASSIUM 12.5 MILLIGRAM(S): 100 TABLET, FILM COATED ORAL at 06:26

## 2022-12-15 RX ADMIN — Medication 81 MILLIGRAM(S): at 12:16

## 2022-12-15 RX ADMIN — Medication 10 UNIT(S): at 17:32

## 2022-12-15 RX ADMIN — ATORVASTATIN CALCIUM 80 MILLIGRAM(S): 80 TABLET, FILM COATED ORAL at 21:08

## 2022-12-15 RX ADMIN — TICAGRELOR 90 MILLIGRAM(S): 90 TABLET ORAL at 13:01

## 2022-12-15 RX ADMIN — HEPARIN SODIUM 1300 UNIT(S)/HR: 5000 INJECTION INTRAVENOUS; SUBCUTANEOUS at 01:00

## 2022-12-15 RX ADMIN — Medication 0.6 MILLIGRAM(S): at 06:27

## 2022-12-15 RX ADMIN — Medication 8 UNIT(S): at 08:08

## 2022-12-15 RX ADMIN — Medication 50 MILLIGRAM(S): at 06:25

## 2022-12-15 RX ADMIN — Medication 2: at 08:07

## 2022-12-15 RX ADMIN — Medication 2: at 11:54

## 2022-12-15 RX ADMIN — HEPARIN SODIUM 1300 UNIT(S)/HR: 5000 INJECTION INTRAVENOUS; SUBCUTANEOUS at 19:14

## 2022-12-15 RX ADMIN — Medication 2: at 17:30

## 2022-12-15 RX ADMIN — WARFARIN SODIUM 7.5 MILLIGRAM(S): 2.5 TABLET ORAL at 21:14

## 2022-12-15 RX ADMIN — Medication 8 UNIT(S): at 11:54

## 2022-12-15 NOTE — PROGRESS NOTE ADULT - ASSESSMENT
43 y/o male [place] male with h/o smoking and pre DM2 presents with chest pain - + AWSTEMI and now s/p JESUS to pLAD and POBA to DIAG.     AWSTEMI  - s/p JESUS to pLAD  - continue DAPT, Lipitor  - continue Toprol 50mg xl daily   - increase losartan 25mg po daily (hold for SBP<90)  - TTE EF 30%, severe LV systolic dysfunction, normal RV   - trend cardiac enzymes until trending down    LV thrombus   - TTE: A filling defect is seen and is consistent with laminar apical mural LV thrombus.  - Heparin to coumadin. f/u INR today.  If INR less than 1.3, give coumadin 7.5mg PO x1  - Goal INR 2-3      Dispo:  - Once INR is at goal, discharge home    - Follow up  appt with Dr. Smyth 12/23 at 11:30pm

## 2022-12-15 NOTE — PROGRESS NOTE ADULT - SUBJECTIVE AND OBJECTIVE BOX
Chief Complaint: DM 2    History: Patient seen at bedside. Reports he is eating meals, denies n/v, denies s/s of hypoglycemia  Noted with discrepancy between serum glucose and AM FS this morning, patient denies eating prior to FS  Patient reports understanding of diabetes discharge plan, feels comfortable with insulin PEN use    MEDICATIONS  (STANDING):  aspirin  chewable 81 milliGRAM(s) Oral daily  atorvastatin 80 milliGRAM(s) Oral at bedtime  chlorhexidine 2% Cloths 1 Application(s) Topical <User Schedule>  colchicine 0.6 milliGRAM(s) Oral every 12 hours  dextrose 5%. 1000 milliLiter(s) (50 mL/Hr) IV Continuous <Continuous>  dextrose 5%. 1000 milliLiter(s) (100 mL/Hr) IV Continuous <Continuous>  dextrose 50% Injectable 25 Gram(s) IV Push once  dextrose 50% Injectable 12.5 Gram(s) IV Push once  dextrose 50% Injectable 25 Gram(s) IV Push once  glucagon  Injectable 1 milliGRAM(s) IntraMuscular once  heparin  Infusion.  Unit(s)/Hr (13 mL/Hr) IV Continuous <Continuous>  influenza   Vaccine 0.5 milliLiter(s) IntraMuscular once  insulin glargine Injectable (LANTUS) 26 Unit(s) SubCutaneous at bedtime  insulin lispro (ADMELOG) corrective regimen sliding scale   SubCutaneous three times a day before meals  insulin lispro (ADMELOG) corrective regimen sliding scale   SubCutaneous at bedtime  insulin lispro Injectable (ADMELOG) 10 Unit(s) SubCutaneous three times a day before meals  losartan 25 milliGRAM(s) Oral daily  metoprolol succinate ER 50 milliGRAM(s) Oral daily  ticagrelor 90 milliGRAM(s) Oral every 12 hours  warfarin 7.5 milliGRAM(s) Oral once    MEDICATIONS  (PRN):  acetaminophen     Tablet .. 650 milliGRAM(s) Oral every 6 hours PRN Mild Pain (1 - 3), Moderate Pain (4 - 6)  dextrose Oral Gel 15 Gram(s) Oral once PRN Blood Glucose LESS THAN 70 milliGRAM(s)/deciliter  heparin   Injectable 6000 Unit(s) IV Push every 6 hours PRN For aPTT less than 40  heparin   Injectable 3000 Unit(s) IV Push every 6 hours PRN For aPTT between 40 - 57    No Known Allergies    Review of Systems:  Cardiovascular: No chest pain  Respiratory: No SOB  GI: No nausea, vomiting  Endocrine: no hypoglycemia    PHYSICAL EXAM:  VITALS: T(C): 36.6 (12-15-22 @ 11:20)  T(F): 97.8 (12-15-22 @ 11:20), Max: 98.4 (12-15-22 @ 06:20)  HR: 90 (12-15-22 @ 11:20) (90 - 104)  BP: 103/64 (12-15-22 @ 11:20) (103/64 - 120/81)  RR:  (17 - 18)  SpO2:  (98% - 99%)  Wt(kg): --  GENERAL: NAD  EYES: No proptosis, no lid lag, anicteric  HEENT:  Atraumatic, Normocephalic, moist mucous membranes  RESPIRATORY: unlabored respirations     CAPILLARY BLOOD GLUCOSE    POCT Blood Glucose.: 202 mg/dL (15 Dec 2022 16:43)  POCT Blood Glucose.: 233 mg/dL (15 Dec 2022 11:29)  POCT Blood Glucose.: 234 mg/dL (15 Dec 2022 07:58)  POCT Blood Glucose.: 194 mg/dL (14 Dec 2022 20:42)  POCT Blood Glucose.: 109 mg/dL (14 Dec 2022 16:49)      12-15    137  |  100  |  13  ----------------------------<  140<H>  4.7   |  27  |  0.92    eGFR: 107    Ca    9.5      12-15  Mg     1.90     12-15  Phos  3.8     12-15    TPro  7.0  /  Alb  3.9  /  TBili  0.8  /  DBili  x   /  AST  52<H>  /  ALT  61<H>  /  AlkPhos  62  12-15      Thyroid Function Tests:  12-11 @ 04:27 TSH 0.60 FreeT4 -- T3 -- Anti TPO -- Anti Thyroglobulin Ab -- TSI --  12-11 @ 01:20 TSH 1.16 FreeT4 -- T3 -- Anti TPO -- Anti Thyroglobulin Ab -- TSI --      A1C with Estimated Average Glucose Result: 10.1 % (12-11-22 @ 04:27)  A1C with Estimated Average Glucose Result: 10.2 % (12-11-22 @ 01:20)    Diet, DASH/TLC:   Sodium & Cholesterol Restricted  Consistent Carbohydrate No Snacks (CSTCHO)     Special Instructions for Nursing:  Sodium & Cholesterol Restricted (12-11-22 @ 08:12) [Active]

## 2022-12-15 NOTE — PROGRESS NOTE ADULT - SUBJECTIVE AND OBJECTIVE BOX
[facility] Division of Hospital Medicine  Ameena Salguero MD  Available via MS Teams    SUBJECTIVE / OVERNIGHT EVENTS: No acute overnight events. Pt reports feeling light headed after blood was drawn, but denies room spinning or difficulty walking. No arm or leg weakness or numbness. Improved after laying down. Denies chest pain, SOB, LE edema    ADDITIONAL REVIEW OF SYSTEMS: Denies fevers, chills, chest pain, SOB, abd pain, n/v/d    MEDICATIONS  (STANDING):  aspirin  chewable 81 milliGRAM(s) Oral daily  atorvastatin 80 milliGRAM(s) Oral at bedtime  chlorhexidine 2% Cloths 1 Application(s) Topical <User Schedule>  colchicine 0.6 milliGRAM(s) Oral every 12 hours  dextrose 5%. 1000 milliLiter(s) (50 mL/Hr) IV Continuous <Continuous>  dextrose 5%. 1000 milliLiter(s) (100 mL/Hr) IV Continuous <Continuous>  dextrose 50% Injectable 25 Gram(s) IV Push once  dextrose 50% Injectable 12.5 Gram(s) IV Push once  dextrose 50% Injectable 25 Gram(s) IV Push once  glucagon  Injectable 1 milliGRAM(s) IntraMuscular once  heparin  Infusion.  Unit(s)/Hr (13 mL/Hr) IV Continuous <Continuous>  influenza   Vaccine 0.5 milliLiter(s) IntraMuscular once  insulin glargine Injectable (LANTUS) 26 Unit(s) SubCutaneous at bedtime  insulin lispro (ADMELOG) corrective regimen sliding scale   SubCutaneous three times a day before meals  insulin lispro (ADMELOG) corrective regimen sliding scale   SubCutaneous at bedtime  insulin lispro Injectable (ADMELOG) 8 Unit(s) SubCutaneous three times a day before meals  losartan 25 milliGRAM(s) Oral daily  metoprolol succinate ER 50 milliGRAM(s) Oral daily  ticagrelor 90 milliGRAM(s) Oral every 12 hours  warfarin 7.5 milliGRAM(s) Oral once    MEDICATIONS  (PRN):  acetaminophen     Tablet .. 650 milliGRAM(s) Oral every 6 hours PRN Mild Pain (1 - 3), Moderate Pain (4 - 6)  dextrose Oral Gel 15 Gram(s) Oral once PRN Blood Glucose LESS THAN 70 milliGRAM(s)/deciliter  heparin   Injectable 6000 Unit(s) IV Push every 6 hours PRN For aPTT less than 40  heparin   Injectable 3000 Unit(s) IV Push every 6 hours PRN For aPTT between 40 - 57      I&O's Summary      PHYSICAL EXAM:  Vital Signs Last 24 Hrs  T(C): 36.6 (15 Dec 2022 11:20), Max: 36.9 (15 Dec 2022 06:20)  T(F): 97.8 (15 Dec 2022 11:20), Max: 98.4 (15 Dec 2022 06:20)  HR: 90 (15 Dec 2022 11:20) (90 - 104)  BP: 103/64 (15 Dec 2022 11:20) (103/64 - 120/81)  BP(mean): --  RR: 17 (15 Dec 2022 11:20) (17 - 18)  SpO2: 98% (15 Dec 2022 11:20) (98% - 99%)    Parameters below as of 15 Dec 2022 11:20  Patient On (Oxygen Delivery Method): room air      CONSTITUTIONAL: NAD, well-developed, well-groomed  EYES: PERRLA; conjunctiva and sclera clear  ENMT: Moist oral mucosa, no pharyngeal injection or exudates; normal dentition  RESPIRATORY: Normal respiratory effort; lungs are clear to auscultation bilaterally  CARDIOVASCULAR: Regular rate and rhythm, normal S1 and S2, no murmur/rub/gallop; No lower extremity edema; Peripheral pulses are 2+ bilaterally  ABDOMEN: Nontender to palpation, normoactive bowel sounds, no rebound/guarding; No hepatosplenomegaly  MUSCULOSKELETAL:  Normal gait; no clubbing or cyanosis of digits; no joint swelling or tenderness to palpation  PSYCH: A+O to person, place, and time; affect appropriate  NEUROLOGY: CN 2-12 are intact and symmetric; no focal motor or sensory deficits. Gait is normal  SKIN: No rashes; no palpable lesions    LABS:                        16.1   9.37  )-----------( 237      ( 15 Dec 2022 06:40 )             49.6     Hb Trend: 16.1<--, 15.8<--, 14.6<--, 14.9<--, 16.3<--  12-15    137  |  100  |  13  ----------------------------<  140<H>  4.7   |  27  |  0.92    Ca    9.5      15 Dec 2022 06:40  Phos  3.8     12-15  Mg     1.90     12-15    TPro  7.0  /  Alb  3.9  /  TBili  0.8  /  DBili  x   /  AST  52<H>  /  ALT  61<H>  /  AlkPhos  62  12-15    Creatinine Trend: 0.92<--, 0.90<--, 0.71<--, 0.77<--, 0.86<--, 0.62<--  PT/INR - ( 15 Dec 2022 10:15 )   PT: 14.1 sec;   INR: 1.21 ratio         PTT - ( 15 Dec 2022 10:15 )  PTT:76.0 sec          SARS-CoV-2: NotDetec (12 Dec 2022 02:02)      RADIOLOGY & ADDITIONAL TESTS:  N/A    COORDINATION OF CARE:  Consultant Communication and Details of Discussion (where applicable): Cardiology- will dose coumadin 7.5mg once

## 2022-12-15 NOTE — PROGRESS NOTE ADULT - ASSESSMENT
This is a 43 yo M /w a PMH of DM2 who present with chest pain and found to have a STEMI with 100% occlusion of proximal LAD c/b reduced ef /w EF 30% now s/p PCI and stenting. Endocrinology for DM2 management given persistent hyperglycemia.     1. DM 2 with hyperglycemia  Uncontrolled, A1c 10.1%  Home Regimen: None, was taking Janumet at some point in the past    While inpatient:  BG target 100-180 mg/dl   Serum glucose discrepancy with AM FS - 140 mg/dl 640AM, 234 mg/dl at 758AM  Patient states he did not eat before breakfast FS  Therefore for now, would continue with Lantus 26 units SQ qHS   Increase Admelog to 10 units SQ TID before meals (Hold if NPO/skips meal)   Continue Admelog LOW dose scale before meals, continue low dose at bedtime   Check BG before meals and bedtime  Consistent carbohydrate diet, RD consult done   Hypoglycemia protocol   DM Education: Please start education on insulin PEN (patient states he knows how to use, will need review), ensure patient completes return demonstration prior to discharge. Would also educate on glucometer and hypoglycemia recognition and treatment    Discharge Plan:   Recommend basal + oral regimen for discharge. Patient is agreeable  Patient will need new prescription for basal insulin PEN, options are: Lantus, Basaglar, Tresiba, Toujeo, Semglee   START Metformin 500 mg PO BID, increase to 1000 mg PO BID after 1 week (note, although he has CHF with reduced EF, he is currently stable with acceptable GFR and lactate levels. Ok to use Metformin)   START SGLT2i: Jardiance 10 mg PO daily OR Farxiga 5 mg PO daily (depending on insurance)- this will provide further glycemic control and also reduce complications and hospitalizations from CHF   STOP Janumet (per patient he was not recently taking)  Can consider GLP1RA as outpatient   In regards to SGLT2i, patient reports +hx of kidney stone, no hx of UTI or genital mycotic infections  Please prescribe a new glucometer, glucose test strips, lancets, alcohol swabs and insulin PEN needles    Followup with Endocrine Practice at [address] [place] [address], [address], [place] [address]; Ph # [phone]    2. HTN  BP target less than 130/80  On Losartan and Metoprolol  Within target, continue to monitor     3. HLD  LDL target less than 70  , s/p STEMI  Continue Atorvastatin 80mg daily if no contraindications    Riya Walker  Nurse Practitioner  Division of Endocrinology & Diabetes  In house pager #[phone]/long range pager #[phone]    If before 9AM or after 6PM, or on weekends/holidays, please call endocrine answering service for assistance ([phone]).  For nonurgent matters email [email or web address] for assistance.

## 2022-12-15 NOTE — PROGRESS NOTE ADULT - SUBJECTIVE AND OBJECTIVE BOX
Dante Hughes NP  CCU Service  Cardiology   Spect: [id number] ([facility])     PATIENT: SHERINE GONZALEZ, MRN: [id number]    CHIEF COMPLAINT: Patient is a 42y old  Male who presents with a chief complaint of chest pain (14 Dec 2022 16:15)      INTERVAL HISTORY/OVERNIGHT EVENTS: No overnight events. Denies abdominal pain, chest pain or SOB, cough. Has been ambulating with assistance. Oriented to person, place, and time. Breathing comfortably on room air.    REVIEW OF SYSTEMS:    Constitutional:     [ ] negative [ ] fevers [ ] chills [ ] weight loss [ ] weight gain  HEENT:                  [ ] negative [ ] dry eyes [ ] eye irritation [ ] postnasal drip [ ] nasal congestion  CV:                         [ ] negative  [ ] chest pain [ ] orthopnea [ ] palpitations [ ] murmur  Resp:                     [ ] negative [ ] cough [ ] shortness of breath [ ] dyspnea [ ] wheezing [ ] sputum [ ] hemoptysis  GI:                          [ ] negative [ ] nausea [ ] vomiting [ ] diarrhea [ ] constipation [ ] abd pain [ ] dysphagia   :                        [ ] negative [ ] dysuria [ ] nocturia [ ] hematuria [ ] increased urinary frequency  Musculoskeletal: [ ] negative [ ] back pain [ ] myalgias [ ] arthralgias [ ] fracture  Skin:                       [ ] negative [ ] rash [ ] itch  Neurological:        [ ] negative [ ] headache [ ] dizziness [ ] syncope [ ] weakness [ ] numbness  Psychiatric:           [ ] negative [ ] anxiety [ ] depression  Endocrine:            [ ] negative [x ] diabetes [ ] thyroid problem  Heme/Lymph:      [ ] negative [ ] anemia [ ] bleeding problem  Allergic/Immune: [ ] negative [ ] itchy eyes [ ] nasal discharge [ ] hives [ ] angioedema    [x ] All other systems negative  [ ] Unable to assess ROS because ________.    MEDICATIONS:  MEDICATIONS  (STANDING):  aspirin  chewable 81 milliGRAM(s) Oral daily  atorvastatin 80 milliGRAM(s) Oral at bedtime  chlorhexidine 2% Cloths 1 Application(s) Topical <User Schedule>  colchicine 0.6 milliGRAM(s) Oral every 12 hours  heparin  Infusion.  Unit(s)/Hr (13 mL/Hr) IV Continuous <Continuous>  influenza   Vaccine 0.5 milliLiter(s) IntraMuscular once  insulin glargine Injectable (LANTUS) 26 Unit(s) SubCutaneous at bedtime  insulin lispro (ADMELOG) corrective regimen sliding scale   SubCutaneous at bedtime  insulin lispro (ADMELOG) corrective regimen sliding scale   SubCutaneous three times a day before meals  insulin lispro Injectable (ADMELOG) 8 Unit(s) SubCutaneous three times a day before meals  losartan 12.5 milliGRAM(s) Oral daily  metoprolol succinate ER 50 milliGRAM(s) Oral daily  ticagrelor 90 milliGRAM(s) Oral every 12 hours    MEDICATIONS  (PRN):  acetaminophen     Tablet .. 650 milliGRAM(s) Oral every 6 hours PRN Mild Pain (1 - 3), Moderate Pain (4 - 6)  dextrose Oral Gel 15 Gram(s) Oral once PRN Blood Glucose LESS THAN 70 milliGRAM(s)/deciliter  heparin   Injectable 6000 Unit(s) IV Push every 6 hours PRN For aPTT less than 40  heparin   Injectable 3000 Unit(s) IV Push every 6 hours PRN For aPTT between 40 - 57      ALLERGIES: Allergies    No Known Allergies    Intolerances        OBJECTIVE:  ICU Vital Signs Last 24 Hrs  T(C): 36.9 (15 Dec 2022 06:20), Max: 37.3 (14 Dec 2022 12:06)  T(F): 98.4 (15 Dec 2022 06:20), Max: 99.2 (14 Dec 2022 12:06)  HR: 101 (15 Dec 2022 06:20) (101 - 104)  BP: 106/67 (15 Dec 2022 06:20) (106/67 - 125/79)  RR: 17 (15 Dec 2022 06:20) (17 - 18)  SpO2: 99% (15 Dec 2022 06:20) (98% - 99%)    O2 Parameters below as of 15 Dec 2022 06:20  Patient On (Oxygen Delivery Method): room air    CAPILLARY BLOOD GLUCOSE      POCT Blood Glucose.: 234 mg/dL (15 Dec 2022 07:58)  POCT Blood Glucose.: 194 mg/dL (14 Dec 2022 20:42)  POCT Blood Glucose.: 109 mg/dL (14 Dec 2022 16:49)  POCT Blood Glucose.: 252 mg/dL (14 Dec 2022 11:48)    CAPILLARY BLOOD GLUCOSE      POCT Blood Glucose.: 234 mg/dL (15 Dec 2022 07:58)    I&O's Summary    Daily     Daily Weight in k.9 (15 Dec 2022 06:20)    PHYSICAL EXAMINATION:  General: Comfortable, no acute distress, cooperative with exam.  HEENT: Moist mucous membranes.  Respiratory: CTAB, normal respiratory effort, no coughing, wheezes, crackles, or rales.  CV: RRR, S1S2, no murmurs, rubs or gallops. No JVD. Distal pulses intact.  Abdominal: Soft, nontender, nondistended, no rebound or guarding, normal bowel sounds.  Neurology: AOx3, no focal neuro defects, MELO x 4.  Extremities: No pitting edema, + Peripheral pulses.  Incisions:   Tubes:    LABS:                          16.1   9.37  )-----------( 237      ( 15 Dec 2022 06:40 )             49.6     12-15    137  |  100  |  13  ----------------------------<  140<H>  4.7   |  27  |  0.92    Ca    9.5      15 Dec 2022 06:40  Phos  3.8     12-15  Mg     1.90     12-15    TPro  7.0  /  Alb  3.9  /  TBili  0.8  /  DBili  x   /  AST  52<H>  /  ALT  61<H>  /  AlkPhos  62  12-15    LIVER FUNCTIONS - ( 15 Dec 2022 06:40 )  Alb: 3.9 g/dL / Pro: 7.0 g/dL / ALK PHOS: 62 U/L / ALT: 61 U/L / AST: 52 U/L / GGT: x           PT/INR - ( 14 Dec 2022 06:40 )   PT: 13.1 sec;   INR: 1.13 ratio         PTT - ( 15 Dec 2022 00:39 )  PTT:75.3 sec      TELEMETRY: NSR, HR 95    EKG:     IMAGING:     TTE with Doppler (w/Cont) (12.13.22 @ 16:48)  CONCLUSIONS:  Limited repeat study with the intravenous injection of  ultrasound enhancing agent to evaluate for left ventricular  thrombus.  Endocardium not well visualized; grossly  moderate to severe segmental left ventricular systolic  dysfunction.  Hypokinesis of the apex, mid to distal  septum, and mid to distal anterior wall.  Endocardial  visualization enhanced with intravenous injection of echo  contrast (Definity).  A filling defect is seen and is  consistent with laminar apical mural LV thrombus.

## 2022-12-15 NOTE — PROGRESS NOTE ADULT - ASSESSMENT
42 y/p [place] male with h/o smoking and pre DM2 presents with chest pain - + AWSTEMI and now s/p JESUS to pLAD and POBA to diag, course complicated by post-STEMI pericarditis and LV thrombus

## 2022-12-15 NOTE — PROGRESS NOTE ADULT - PROBLEM SELECTOR PLAN 1
Repeat echo demonstrating LV thrombus  - pt on heparin gtt with bridge to coumadin  - dose coumadin 7.5mg tonight  - check INR daily

## 2022-12-16 LAB
ANION GAP SERPL CALC-SCNC: 10 MMOL/L — SIGNIFICANT CHANGE UP (ref 7–14)
APTT BLD: 94.2 SEC — HIGH (ref 27–36.3)
BUN SERPL-MCNC: 11 MG/DL — SIGNIFICANT CHANGE UP (ref 7–23)
CALCIUM SERPL-MCNC: 9.4 MG/DL — SIGNIFICANT CHANGE UP (ref 8.4–10.5)
CHLORIDE SERPL-SCNC: 101 MMOL/L — SIGNIFICANT CHANGE UP (ref 98–107)
CO2 SERPL-SCNC: 24 MMOL/L — SIGNIFICANT CHANGE UP (ref 22–31)
CREAT SERPL-MCNC: 0.77 MG/DL — SIGNIFICANT CHANGE UP (ref 0.5–1.3)
EGFR: 115 ML/MIN/1.73M2 — SIGNIFICANT CHANGE UP
GLUCOSE BLDC GLUCOMTR-MCNC: 138 MG/DL — HIGH (ref 70–99)
GLUCOSE BLDC GLUCOMTR-MCNC: 152 MG/DL — HIGH (ref 70–99)
GLUCOSE BLDC GLUCOMTR-MCNC: 154 MG/DL — HIGH (ref 70–99)
GLUCOSE BLDC GLUCOMTR-MCNC: 206 MG/DL — HIGH (ref 70–99)
GLUCOSE SERPL-MCNC: 133 MG/DL — HIGH (ref 70–99)
HCT VFR BLD CALC: 46.8 % — SIGNIFICANT CHANGE UP (ref 39–50)
HGB BLD-MCNC: 15.6 G/DL — SIGNIFICANT CHANGE UP (ref 13–17)
INR BLD: 1.24 RATIO — HIGH (ref 0.88–1.16)
MAGNESIUM SERPL-MCNC: 1.9 MG/DL — SIGNIFICANT CHANGE UP (ref 1.6–2.6)
MCHC RBC-ENTMCNC: 31.5 PG — SIGNIFICANT CHANGE UP (ref 27–34)
MCHC RBC-ENTMCNC: 33.3 GM/DL — SIGNIFICANT CHANGE UP (ref 32–36)
MCV RBC AUTO: 94.5 FL — SIGNIFICANT CHANGE UP (ref 80–100)
NRBC # BLD: 0 /100 WBCS — SIGNIFICANT CHANGE UP (ref 0–0)
NRBC # FLD: 0 K/UL — SIGNIFICANT CHANGE UP (ref 0–0)
PHOSPHATE SERPL-MCNC: 3.6 MG/DL — SIGNIFICANT CHANGE UP (ref 2.5–4.5)
PLATELET # BLD AUTO: 225 K/UL — SIGNIFICANT CHANGE UP (ref 150–400)
POTASSIUM SERPL-MCNC: 3.9 MMOL/L — SIGNIFICANT CHANGE UP (ref 3.5–5.3)
POTASSIUM SERPL-SCNC: 3.9 MMOL/L — SIGNIFICANT CHANGE UP (ref 3.5–5.3)
PROTHROM AB SERPL-ACNC: 14.4 SEC — HIGH (ref 10.5–13.4)
RBC # BLD: 4.95 M/UL — SIGNIFICANT CHANGE UP (ref 4.2–5.8)
RBC # FLD: 12.1 % — SIGNIFICANT CHANGE UP (ref 10.3–14.5)
SODIUM SERPL-SCNC: 135 MMOL/L — SIGNIFICANT CHANGE UP (ref 135–145)
WBC # BLD: 8.08 K/UL — SIGNIFICANT CHANGE UP (ref 3.8–10.5)
WBC # FLD AUTO: 8.08 K/UL — SIGNIFICANT CHANGE UP (ref 3.8–10.5)

## 2022-12-16 PROCEDURE — 99233 SBSQ HOSP IP/OBS HIGH 50: CPT

## 2022-12-16 PROCEDURE — 99232 SBSQ HOSP IP/OBS MODERATE 35: CPT

## 2022-12-16 RX ORDER — INSULIN LISPRO 100/ML
12 VIAL (ML) SUBCUTANEOUS
Refills: 0 | Status: DISCONTINUED | OUTPATIENT
Start: 2022-12-16 | End: 2022-12-18

## 2022-12-16 RX ORDER — WARFARIN SODIUM 2.5 MG/1
7.5 TABLET ORAL ONCE
Refills: 0 | Status: DISCONTINUED | OUTPATIENT
Start: 2022-12-16 | End: 2022-12-16

## 2022-12-16 RX ORDER — ISOPROPYL ALCOHOL, BENZOCAINE .7; .06 ML/ML; ML/ML
1 SWAB TOPICAL
Qty: 100 | Refills: 1
Start: 2022-12-16 | End: 2023-02-03

## 2022-12-16 RX ORDER — WARFARIN SODIUM 2.5 MG/1
7.5 TABLET ORAL ONCE
Refills: 0 | Status: COMPLETED | OUTPATIENT
Start: 2022-12-16 | End: 2022-12-16

## 2022-12-16 RX ORDER — CLOPIDOGREL BISULFATE 75 MG/1
75 TABLET, FILM COATED ORAL DAILY
Refills: 0 | Status: DISCONTINUED | OUTPATIENT
Start: 2022-12-17 | End: 2022-12-18

## 2022-12-16 RX ORDER — CLOPIDOGREL BISULFATE 75 MG/1
600 TABLET, FILM COATED ORAL ONCE
Refills: 0 | Status: COMPLETED | OUTPATIENT
Start: 2022-12-16 | End: 2022-12-16

## 2022-12-16 RX ADMIN — Medication 10 UNIT(S): at 08:13

## 2022-12-16 RX ADMIN — ATORVASTATIN CALCIUM 80 MILLIGRAM(S): 80 TABLET, FILM COATED ORAL at 22:10

## 2022-12-16 RX ADMIN — WARFARIN SODIUM 7.5 MILLIGRAM(S): 2.5 TABLET ORAL at 22:50

## 2022-12-16 RX ADMIN — Medication 12 UNIT(S): at 17:01

## 2022-12-16 RX ADMIN — Medication 81 MILLIGRAM(S): at 12:02

## 2022-12-16 RX ADMIN — HEPARIN SODIUM 1300 UNIT(S)/HR: 5000 INJECTION INTRAVENOUS; SUBCUTANEOUS at 07:15

## 2022-12-16 RX ADMIN — LOSARTAN POTASSIUM 25 MILLIGRAM(S): 100 TABLET, FILM COATED ORAL at 06:01

## 2022-12-16 RX ADMIN — CLOPIDOGREL BISULFATE 600 MILLIGRAM(S): 75 TABLET, FILM COATED ORAL at 18:10

## 2022-12-16 RX ADMIN — Medication 2: at 12:03

## 2022-12-16 RX ADMIN — TICAGRELOR 90 MILLIGRAM(S): 90 TABLET ORAL at 00:13

## 2022-12-16 RX ADMIN — INSULIN GLARGINE 26 UNIT(S): 100 INJECTION, SOLUTION SUBCUTANEOUS at 22:04

## 2022-12-16 RX ADMIN — Medication 1: at 08:13

## 2022-12-16 RX ADMIN — Medication 10 UNIT(S): at 12:04

## 2022-12-16 RX ADMIN — HEPARIN SODIUM 1300 UNIT(S)/HR: 5000 INJECTION INTRAVENOUS; SUBCUTANEOUS at 19:36

## 2022-12-16 RX ADMIN — Medication 0.6 MILLIGRAM(S): at 06:01

## 2022-12-16 RX ADMIN — Medication 50 MILLIGRAM(S): at 06:01

## 2022-12-16 RX ADMIN — TICAGRELOR 90 MILLIGRAM(S): 90 TABLET ORAL at 13:20

## 2022-12-16 RX ADMIN — CHLORHEXIDINE GLUCONATE 1 APPLICATION(S): 213 SOLUTION TOPICAL at 06:01

## 2022-12-16 RX ADMIN — Medication 0.6 MILLIGRAM(S): at 17:45

## 2022-12-16 NOTE — PROGRESS NOTE ADULT - SUBJECTIVE AND OBJECTIVE BOX
Chief Complaint: DM 2    History: Patient seen at bedside. Reports he is eating meals, denies n/v, denies s/s of hypoglycemia  Patient reports understanding of diabetes discharge plan, feels comfortable with insulin PEN use    MEDICATIONS  (STANDING):  aspirin  chewable 81 milliGRAM(s) Oral daily  atorvastatin 80 milliGRAM(s) Oral at bedtime  chlorhexidine 2% Cloths 1 Application(s) Topical <User Schedule>  colchicine 0.6 milliGRAM(s) Oral every 12 hours  dextrose 5%. 1000 milliLiter(s) (100 mL/Hr) IV Continuous <Continuous>  dextrose 5%. 1000 milliLiter(s) (50 mL/Hr) IV Continuous <Continuous>  dextrose 50% Injectable 25 Gram(s) IV Push once  dextrose 50% Injectable 12.5 Gram(s) IV Push once  dextrose 50% Injectable 25 Gram(s) IV Push once  glucagon  Injectable 1 milliGRAM(s) IntraMuscular once  heparin  Infusion.  Unit(s)/Hr (13 mL/Hr) IV Continuous <Continuous>  influenza   Vaccine 0.5 milliLiter(s) IntraMuscular once  insulin glargine Injectable (LANTUS) 26 Unit(s) SubCutaneous at bedtime  insulin lispro (ADMELOG) corrective regimen sliding scale   SubCutaneous three times a day before meals  insulin lispro (ADMELOG) corrective regimen sliding scale   SubCutaneous at bedtime  insulin lispro Injectable (ADMELOG) 10 Unit(s) SubCutaneous three times a day before meals  losartan 25 milliGRAM(s) Oral daily  metoprolol succinate ER 50 milliGRAM(s) Oral daily  ticagrelor 90 milliGRAM(s) Oral every 12 hours  warfarin 7.5 milliGRAM(s) Oral once      No Known Allergies    Review of Systems:  Cardiovascular: No chest pain  Respiratory: No SOB  GI: No nausea, vomiting  Endocrine: no hypoglycemia    PHYSICAL EXAM:  Vital Signs Last 24 Hrs  T(C): 36.9 (16 Dec 2022 10:50), Max: 37.2 (15 Dec 2022 21:00)  T(F): 98.4 (16 Dec 2022 10:50), Max: 98.9 (15 Dec 2022 21:00)  HR: 90 (16 Dec 2022 10:50) (87 - 92)  BP: 102/70 (16 Dec 2022 10:50) (101/67 - 102/70)  BP(mean): --  RR: 18 (16 Dec 2022 10:50) (17 - 18)  SpO2: 99% (16 Dec 2022 10:50) (98% - 100%)    Parameters below as of 16 Dec 2022 10:50  Patient On (Oxygen Delivery Method): room air      GENERAL: NAD  EYES: No proptosis, no lid lag, anicteric  HEENT:  Atraumatic, Normocephalic, moist mucous membranes  RESPIRATORY: unlabored respirations     CAPILLARY BLOOD GLUCOSE      POCT Blood Glucose.: 206 mg/dL (16 Dec 2022 11:28)  POCT Blood Glucose.: 152 mg/dL (16 Dec 2022 07:48)  POCT Blood Glucose.: 138 mg/dL (15 Dec 2022 21:15)  POCT Blood Glucose.: 202 mg/dL (15 Dec 2022 16:43)      12-16    135  |  101  |  11  ----------------------------<  133<H>  3.9   |  24  |  0.77    Ca    9.4      16 Dec 2022 06:30  Phos  3.6     12-16  Mg     1.90     12-16    TPro  7.0  /  Alb  3.9  /  TBili  0.8  /  DBili  x   /  AST  52<H>  /  ALT  61<H>  /  AlkPhos  62  12-15      Thyroid Function Tests:  12-11 @ 04:27 TSH 0.60 FreeT4 -- T3 -- Anti TPO -- Anti Thyroglobulin Ab -- TSI --  12-11 @ 01:20 TSH 1.16 FreeT4 -- T3 -- Anti TPO -- Anti Thyroglobulin Ab -- TSI --      A1C with Estimated Average Glucose Result: 10.1 % (12-11-22 @ 04:27)  A1C with Estimated Average Glucose Result: 10.2 % (12-11-22 @ 01:20)    Diet, DASH/TLC:   Sodium & Cholesterol Restricted  Consistent Carbohydrate No Snacks (CSTCHO)     Special Instructions for Nursing:  Sodium & Cholesterol Restricted (12-11-22 @ 08:12)

## 2022-12-16 NOTE — PROGRESS NOTE ADULT - ASSESSMENT
42 y/p [place] male with h/o smoking and pre DM2 presents with chest pain - + AWSTEMI and now s/p JESUS to pLAD and POBA to diag, course complicated by post-STEMI pericarditis and LV thrombus

## 2022-12-16 NOTE — PROGRESS NOTE ADULT - ASSESSMENT
This is a 43 yo M /w a PMH of DM2 who present with chest pain and found to have a STEMI with 100% occlusion of proximal LAD c/b reduced ef /w EF 30% now s/p PCI and stenting. Endocrinology for DM2 management given persistent hyperglycemia.     1. DM 2 with hyperglycemia  Uncontrolled, A1c 10.1%  Home Regimen: None, was taking Janumet at some point in the past    While inpatient:  BG target 100-180 mg/dl   Continue with Lantus 26 units SQ qHS   Increase Admelog to 12 units SQ TID before meals (Hold if NPO/skips meal)   Continue Admelog LOW dose scale before meals, continue low dose at bedtime   Check BG before meals and bedtime  Consistent carbohydrate diet, RD consult done   Hypoglycemia protocol   DM Education: Please start education on insulin PEN (patient states he knows how to use, will need review), ensure patient completes return demonstration prior to discharge. Would also educate on glucometer and hypoglycemia recognition and treatment    Discharge Plan:   Recommend basal + oral regimen for discharge. Patient is agreeable  Patient will need new prescription for basal insulin PEN, options are: Lantus, Basaglar, Tresiba, Toujeo, Semglee   START Metformin 500 mg PO BID, increase to 1000 mg PO BID after 1 week (note, although he has CHF with reduced EF, he is currently stable with acceptable GFR and lactate levels. Ok to use Metformin)   START SGLT2i: Jardiance 10 mg PO daily OR Farxiga 5 mg PO daily (depending on insurance)- this will provide further glycemic control and also reduce complications and hospitalizations from CHF   STOP Janumet (per patient he was not recently taking)  Can consider GLP1RA as outpatient   In regards to SGLT2i, patient reports +hx of kidney stone, no hx of UTI or genital mycotic infections  Please prescribe a new glucometer, glucose test strips, lancets, alcohol swabs and insulin PEN needles    Followup with Endocrine Practice at [address] [place] [address], [address], [facility] [place] [address]; Ph # [phone]    Please note: Disscused above with primary team today, TONYA Smith - please send SGLT2 to determine coverage and if prior authorization is needed, please obtain today prior to weekend     2. HTN  BP target less than 130/80  On Losartan and Metoprolol  Within target, continue to monitor     3. HLD  LDL target less than 70  , s/p STEMI  Continue Atorvastatin 80mg daily if no contraindications    ALINA Lopez-BC  Nurse Practitioner   Division of Endocrinology  Pager: GRETCHEN pager [phone]    If out of hospital/unavailable when paged, please note: patient will be cared for by another provider on the endocrine service.  Please call the endocrine answering service for assistance to reach covering provider ([phone]). For non-urgent matters, please email [email or web address] for assistance.

## 2022-12-16 NOTE — PROGRESS NOTE ADULT - ASSESSMENT
41 y/o male [place]an male with h/o smoking and pre DM2 presents with chest pain - + AWSTEMI and now s/p JESUS to pLAD and POBA to DIAG.     AWSTEMI  - s/p JESUS to pLAD  - continue DAPT, Lipitor  - continue Toprol 50mg xl daily   - continue losartan 25mg po daily (hold for SBP<90)  - TTE EF 30%, severe LV systolic dysfunction, normal RV   - trend cardiac enzymes until trending down    LV thrombus   - TTE: A filling defect is seen and is consistent with laminar apical mural LV thrombus.  - Heparin to coumadin. INR 1.24. Give coumadin 7.5mg PO x1 today   - Goal INR 2-3      Dispo:  - Once INR is at goal, discharge home    - Follow up  appt with Dr. Smyth 12/23 at 11:30pm

## 2022-12-16 NOTE — PROGRESS NOTE ADULT - PROBLEM SELECTOR PLAN 1
Repeat echo demonstrating LV thrombus  - pt on heparin gtt with bridge to coumadin  - dose coumadin 7.5mg tonight  - check INR daily  - c/w cardiology, no role for DOAC

## 2022-12-16 NOTE — PROGRESS NOTE ADULT - SUBJECTIVE AND OBJECTIVE BOX
Dante Hughes NP  CCU Service  Cardiology   Spect: [id number] ([facility])     PATIENT: SHERINE GONZALEZ, MRN: [id number]    CHIEF COMPLAINT: Patient is a 42y old  Male who presents with a chief complaint of chest pain (15 Dec 2022 16:48)      INTERVAL HISTORY/OVERNIGHT EVENTS: No overnight events. Denies abdominal pain, chest pain or SOB, cough. Has been ambulating without assistance. Oriented to person, place, and time. Breathing comfortably on room air.    REVIEW OF SYSTEMS:    Constitutional:     [ ] negative [ ] fevers [ ] chills [ ] weight loss [ ] weight gain  HEENT:                  [ ] negative [ ] dry eyes [ ] eye irritation [ ] postnasal drip [ ] nasal congestion  CV:                         [ ] negative  [ ] chest pain [ ] orthopnea [ ] palpitations [ ] murmur  Resp:                     [ ] negative [ ] cough [ ] shortness of breath [ ] dyspnea [ ] wheezing [ ] sputum [ ] hemoptysis  GI:                          [ ] negative [ ] nausea [ ] vomiting [ ] diarrhea [ ] constipation [ ] abd pain [ ] dysphagia   :                        [ ] negative [ ] dysuria [ ] nocturia [ ] hematuria [ ] increased urinary frequency  Musculoskeletal: [ ] negative [ ] back pain [ ] myalgias [ ] arthralgias [ ] fracture  Skin:                       [ ] negative [ ] rash [ ] itch  Neurological:        [ ] negative [ ] headache [ ] dizziness [ ] syncope [ ] weakness [ ] numbness  Psychiatric:           [ ] negative [ ] anxiety [ ] depression  Endocrine:            [ ] negative [x diabetes [ ] thyroid problem  Heme/Lymph:      [ ] negative [ ] anemia [ ] bleeding problem  Allergic/Immune: [ ] negative [ ] itchy eyes [ ] nasal discharge [ ] hives [ ] angioedema    [x ] All other systems negative  [ ] Unable to assess ROS because ________.    MEDICATIONS:  MEDICATIONS  (STANDING):  aspirin  chewable 81 milliGRAM(s) Oral daily  atorvastatin 80 milliGRAM(s) Oral at bedtime  chlorhexidine 2% Cloths 1 Application(s) Topical <User Schedule>  colchicine 0.6 milliGRAM(s) Oral every 12 hours  heparin  Infusion.  Unit(s)/Hr (13 mL/Hr) IV Continuous <Continuous>  influenza   Vaccine 0.5 milliLiter(s) IntraMuscular once  insulin glargine Injectable (LANTUS) 26 Unit(s) SubCutaneous at bedtime  insulin lispro (ADMELOG) corrective regimen sliding scale   SubCutaneous three times a day before meals  insulin lispro (ADMELOG) corrective regimen sliding scale   SubCutaneous at bedtime  insulin lispro Injectable (ADMELOG) 10 Unit(s) SubCutaneous three times a day before meals  losartan 25 milliGRAM(s) Oral daily  metoprolol succinate ER 50 milliGRAM(s) Oral daily  ticagrelor 90 milliGRAM(s) Oral every 12 hours    MEDICATIONS  (PRN):  acetaminophen     Tablet .. 650 milliGRAM(s) Oral every 6 hours PRN Mild Pain (1 - 3), Moderate Pain (4 - 6)  dextrose Oral Gel 15 Gram(s) Oral once PRN Blood Glucose LESS THAN 70 milliGRAM(s)/deciliter  heparin   Injectable 6000 Unit(s) IV Push every 6 hours PRN For aPTT less than 40  heparin   Injectable 3000 Unit(s) IV Push every 6 hours PRN For aPTT between 40 - 57      ALLERGIES: Allergies    No Known Allergies    Intolerances        OBJECTIVE:  ICU Vital Signs Last 24 Hrs  T(C): 36.8 (16 Dec 2022 05:58), Max: 37.2 (15 Dec 2022 21:00)  T(F): 98.2 (16 Dec 2022 05:58), Max: 98.9 (15 Dec 2022 21:00)  HR: 92 (16 Dec 2022 05:58) (87 - 92)  BP: 101/72 (16 Dec 2022 05:58) (101/67 - 103/64)  RR: 17 (16 Dec 2022 05:58) (17 - 18)  SpO2: 100% (16 Dec 2022 05:58) (98% - 100%)    O2 Parameters below as of 16 Dec 2022 05:58  Patient On (Oxygen Delivery Method): room air    CAPILLARY BLOOD GLUCOSE      POCT Blood Glucose.: 152 mg/dL (16 Dec 2022 07:48)  POCT Blood Glucose.: 138 mg/dL (15 Dec 2022 21:15)  POCT Blood Glucose.: 202 mg/dL (15 Dec 2022 16:43)  POCT Blood Glucose.: 233 mg/dL (15 Dec 2022 11:29)    CAPILLARY BLOOD GLUCOSE      POCT Blood Glucose.: 152 mg/dL (16 Dec 2022 07:48)    I&O's Summary    Daily     Daily Weight in k.1 (16 Dec 2022 05:58)    PHYSICAL EXAMINATION:  General: Comfortable, no acute distress, cooperative with exam.  HEENT: Moist mucous membranes.  Respiratory: CTAB, normal respiratory effort, no coughing, wheezes, crackles, or rales.  CV: RRR, S1S2, no murmurs, rubs or gallops. No JVD. Distal pulses intact.  Abdominal: Soft, nontender, nondistended, no rebound or guarding, normal bowel sounds.  Neurology: AOx3, no focal neuro defects, MELO x 4.  Extremities: No pitting edema, + Peripheral pulses.  Incisions:   Tubes:    LABS:                          15.6   8.08  )-----------( 225      ( 16 Dec 2022 06:30 )             46.8     12-    135  |  101  |  11  ----------------------------<  133<H>  3.9   |  24  |  0.77    Ca    9.4      16 Dec 2022 06:30  Phos  3.6     12-16  Mg     1.90     12-16    TPro  7.0  /  Alb  3.9  /  TBili  0.8  /  DBili  x   /  AST  52<H>  /  ALT  61<H>  /  AlkPhos  62  12-15    LIVER FUNCTIONS - ( 15 Dec 2022 06:40 )  Alb: 3.9 g/dL / Pro: 7.0 g/dL / ALK PHOS: 62 U/L / ALT: 61 U/L / AST: 52 U/L / GGT: x           PT/INR - ( 16 Dec 2022 06:30 )   PT: 14.4 sec;   INR: 1.24 ratio         PTT - ( 16 Dec 2022 06:30 )  PTT:94.2 sec    TELEMETRY: NSR    EKG:     IMAGING:     TTE with Doppler (w/Cont) (12.13.22 @ 16:48)  CONCLUSIONS:  Limited repeat study with the intravenous injection of  ultrasound enhancing agent to evaluate for left ventricular  thrombus.  Endocardium not well visualized; grossly  moderate to severe segmental left ventricular systolic  dysfunction.  Hypokinesis of the apex, mid to distal  septum, and mid to distal anterior wall.  Endocardial  visualization enhanced with intravenous injection of echo  contrast (Definity).  A filling defect is seen and is  consistent with laminar apical mural LV thrombus.

## 2022-12-16 NOTE — PROGRESS NOTE ADULT - SUBJECTIVE AND OBJECTIVE BOX
[facility] Division of Hospital Medicine  Ameena Salguero MD  Available via MS Teams    SUBJECTIVE / OVERNIGHT EVENTS: No acute overnight events. INR this AM still subtherapeutic. Denies chest pain, SOB, bleeding    ADDITIONAL REVIEW OF SYSTEMS: Denies abdominal pain, n/v/d, fever, chills    MEDICATIONS  (STANDING):  aspirin  chewable 81 milliGRAM(s) Oral daily  atorvastatin 80 milliGRAM(s) Oral at bedtime  chlorhexidine 2% Cloths 1 Application(s) Topical <User Schedule>  colchicine 0.6 milliGRAM(s) Oral every 12 hours  dextrose 5%. 1000 milliLiter(s) (50 mL/Hr) IV Continuous <Continuous>  dextrose 5%. 1000 milliLiter(s) (100 mL/Hr) IV Continuous <Continuous>  dextrose 50% Injectable 25 Gram(s) IV Push once  dextrose 50% Injectable 12.5 Gram(s) IV Push once  dextrose 50% Injectable 25 Gram(s) IV Push once  glucagon  Injectable 1 milliGRAM(s) IntraMuscular once  heparin  Infusion.  Unit(s)/Hr (13 mL/Hr) IV Continuous <Continuous>  influenza   Vaccine 0.5 milliLiter(s) IntraMuscular once  insulin glargine Injectable (LANTUS) 26 Unit(s) SubCutaneous at bedtime  insulin lispro (ADMELOG) corrective regimen sliding scale   SubCutaneous three times a day before meals  insulin lispro (ADMELOG) corrective regimen sliding scale   SubCutaneous at bedtime  insulin lispro Injectable (ADMELOG) 12 Unit(s) SubCutaneous three times a day before meals  losartan 25 milliGRAM(s) Oral daily  metoprolol succinate ER 50 milliGRAM(s) Oral daily  ticagrelor 90 milliGRAM(s) Oral every 12 hours  warfarin 7.5 milliGRAM(s) Oral once    MEDICATIONS  (PRN):  acetaminophen     Tablet .. 650 milliGRAM(s) Oral every 6 hours PRN Mild Pain (1 - 3), Moderate Pain (4 - 6)  dextrose Oral Gel 15 Gram(s) Oral once PRN Blood Glucose LESS THAN 70 milliGRAM(s)/deciliter  heparin   Injectable 6000 Unit(s) IV Push every 6 hours PRN For aPTT less than 40  heparin   Injectable 3000 Unit(s) IV Push every 6 hours PRN For aPTT between 40 - 57      I&O's Summary      PHYSICAL EXAM:  Vital Signs Last 24 Hrs  T(C): 36.9 (16 Dec 2022 10:50), Max: 37.2 (15 Dec 2022 21:00)  T(F): 98.4 (16 Dec 2022 10:50), Max: 98.9 (15 Dec 2022 21:00)  HR: 90 (16 Dec 2022 10:50) (87 - 92)  BP: 102/70 (16 Dec 2022 10:50) (101/67 - 102/70)  BP(mean): --  RR: 18 (16 Dec 2022 10:50) (17 - 18)  SpO2: 99% (16 Dec 2022 10:50) (98% - 100%)    Parameters below as of 16 Dec 2022 10:50  Patient On (Oxygen Delivery Method): room air      CONSTITUTIONAL: NAD, well-developed, well-groomed  EYES: PERRLA; conjunctiva and sclera clear  ENMT: Moist oral mucosa, no pharyngeal injection or exudates; normal dentition  RESPIRATORY: Normal respiratory effort; lungs are clear to auscultation bilaterally  CARDIOVASCULAR: Regular rate and rhythm, normal S1 and S2, no murmur/rub/gallop; No lower extremity edema; Peripheral pulses are 2+ bilaterally  ABDOMEN: Nontender to palpation, normoactive bowel sounds, no rebound/guarding; No hepatosplenomegaly  MUSCULOSKELETAL:  Normal gait; no clubbing or cyanosis of digits; no joint swelling or tenderness to palpation  PSYCH: A+O to person, place, and time; affect appropriate  NEUROLOGY: CN 2-12 are intact and symmetric; no gross sensory deficits   SKIN: No rashes; no palpable lesions    LABS:                        15.6   8.08  )-----------( 225      ( 16 Dec 2022 06:30 )             46.8     Hb Trend: 15.6<--, 16.1<--, 15.8<--, 14.6<--, 14.9<--  12-16    135  |  101  |  11  ----------------------------<  133<H>  3.9   |  24  |  0.77    Ca    9.4      16 Dec 2022 06:30  Phos  3.6     12-16  Mg     1.90     12-16    TPro  7.0  /  Alb  3.9  /  TBili  0.8  /  DBili  x   /  AST  52<H>  /  ALT  61<H>  /  AlkPhos  62  12-15    Creatinine Trend: 0.77<--, 0.92<--, 0.90<--, 0.71<--, 0.77<--, 0.86<--  PT/INR - ( 16 Dec 2022 06:30 )   PT: 14.4 sec;   INR: 1.24 ratio         PTT - ( 16 Dec 2022 06:30 )  PTT:94.2 sec          SARS-CoV-2: NotDetec (12 Dec 2022 02:02)      RADIOLOGY & ADDITIONAL TESTS:  N/A    COORDINATION OF CARE:  Consultant Communication and Details of Discussion (where applicable): Cardiology- continue bridge to coumadin

## 2022-12-17 LAB
ANION GAP SERPL CALC-SCNC: 10 MMOL/L — SIGNIFICANT CHANGE UP (ref 7–14)
APTT BLD: 106.6 SEC — HIGH (ref 27–36.3)
APTT BLD: 95.8 SEC — HIGH (ref 27–36.3)
BUN SERPL-MCNC: 13 MG/DL — SIGNIFICANT CHANGE UP (ref 7–23)
CALCIUM SERPL-MCNC: 9.3 MG/DL — SIGNIFICANT CHANGE UP (ref 8.4–10.5)
CHLORIDE SERPL-SCNC: 102 MMOL/L — SIGNIFICANT CHANGE UP (ref 98–107)
CO2 SERPL-SCNC: 24 MMOL/L — SIGNIFICANT CHANGE UP (ref 22–31)
CREAT SERPL-MCNC: 0.79 MG/DL — SIGNIFICANT CHANGE UP (ref 0.5–1.3)
CULTURE RESULTS: SIGNIFICANT CHANGE UP
CULTURE RESULTS: SIGNIFICANT CHANGE UP
EGFR: 114 ML/MIN/1.73M2 — SIGNIFICANT CHANGE UP
GLUCOSE BLDC GLUCOMTR-MCNC: 135 MG/DL — HIGH (ref 70–99)
GLUCOSE BLDC GLUCOMTR-MCNC: 142 MG/DL — HIGH (ref 70–99)
GLUCOSE BLDC GLUCOMTR-MCNC: 205 MG/DL — HIGH (ref 70–99)
GLUCOSE BLDC GLUCOMTR-MCNC: 220 MG/DL — HIGH (ref 70–99)
GLUCOSE SERPL-MCNC: 127 MG/DL — HIGH (ref 70–99)
HCT VFR BLD CALC: 45.6 % — SIGNIFICANT CHANGE UP (ref 39–50)
HGB BLD-MCNC: 15.5 G/DL — SIGNIFICANT CHANGE UP (ref 13–17)
INR BLD: 1.74 RATIO — HIGH (ref 0.88–1.16)
MAGNESIUM SERPL-MCNC: 1.8 MG/DL — SIGNIFICANT CHANGE UP (ref 1.6–2.6)
MCHC RBC-ENTMCNC: 31.5 PG — SIGNIFICANT CHANGE UP (ref 27–34)
MCHC RBC-ENTMCNC: 34 GM/DL — SIGNIFICANT CHANGE UP (ref 32–36)
MCV RBC AUTO: 92.7 FL — SIGNIFICANT CHANGE UP (ref 80–100)
NRBC # BLD: 0 /100 WBCS — SIGNIFICANT CHANGE UP (ref 0–0)
NRBC # FLD: 0 K/UL — SIGNIFICANT CHANGE UP (ref 0–0)
PHOSPHATE SERPL-MCNC: 4.1 MG/DL — SIGNIFICANT CHANGE UP (ref 2.5–4.5)
PLATELET # BLD AUTO: 262 K/UL — SIGNIFICANT CHANGE UP (ref 150–400)
POTASSIUM SERPL-MCNC: 4.2 MMOL/L — SIGNIFICANT CHANGE UP (ref 3.5–5.3)
POTASSIUM SERPL-SCNC: 4.2 MMOL/L — SIGNIFICANT CHANGE UP (ref 3.5–5.3)
PROTHROM AB SERPL-ACNC: 20.3 SEC — HIGH (ref 10.5–13.4)
RBC # BLD: 4.92 M/UL — SIGNIFICANT CHANGE UP (ref 4.2–5.8)
RBC # FLD: 11.8 % — SIGNIFICANT CHANGE UP (ref 10.3–14.5)
SODIUM SERPL-SCNC: 136 MMOL/L — SIGNIFICANT CHANGE UP (ref 135–145)
SPECIMEN SOURCE: SIGNIFICANT CHANGE UP
SPECIMEN SOURCE: SIGNIFICANT CHANGE UP
WBC # BLD: 7.09 K/UL — SIGNIFICANT CHANGE UP (ref 3.8–10.5)
WBC # FLD AUTO: 7.09 K/UL — SIGNIFICANT CHANGE UP (ref 3.8–10.5)

## 2022-12-17 PROCEDURE — 99233 SBSQ HOSP IP/OBS HIGH 50: CPT

## 2022-12-17 RX ORDER — WARFARIN SODIUM 2.5 MG/1
5 TABLET ORAL ONCE
Refills: 0 | Status: COMPLETED | OUTPATIENT
Start: 2022-12-17 | End: 2022-12-17

## 2022-12-17 RX ADMIN — WARFARIN SODIUM 5 MILLIGRAM(S): 2.5 TABLET ORAL at 21:25

## 2022-12-17 RX ADMIN — HEPARIN SODIUM 1100 UNIT(S)/HR: 5000 INJECTION INTRAVENOUS; SUBCUTANEOUS at 09:50

## 2022-12-17 RX ADMIN — HEPARIN SODIUM 1300 UNIT(S)/HR: 5000 INJECTION INTRAVENOUS; SUBCUTANEOUS at 07:38

## 2022-12-17 RX ADMIN — CHLORHEXIDINE GLUCONATE 1 APPLICATION(S): 213 SOLUTION TOPICAL at 06:24

## 2022-12-17 RX ADMIN — Medication 0.6 MILLIGRAM(S): at 06:22

## 2022-12-17 RX ADMIN — Medication 12 UNIT(S): at 12:09

## 2022-12-17 RX ADMIN — Medication 0.6 MILLIGRAM(S): at 17:27

## 2022-12-17 RX ADMIN — HEPARIN SODIUM 1100 UNIT(S)/HR: 5000 INJECTION INTRAVENOUS; SUBCUTANEOUS at 17:29

## 2022-12-17 RX ADMIN — HEPARIN SODIUM 1100 UNIT(S)/HR: 5000 INJECTION INTRAVENOUS; SUBCUTANEOUS at 19:14

## 2022-12-17 RX ADMIN — Medication 12 UNIT(S): at 07:53

## 2022-12-17 RX ADMIN — Medication 81 MILLIGRAM(S): at 12:11

## 2022-12-17 RX ADMIN — HEPARIN SODIUM 1100 UNIT(S)/HR: 5000 INJECTION INTRAVENOUS; SUBCUTANEOUS at 10:54

## 2022-12-17 RX ADMIN — INSULIN GLARGINE 26 UNIT(S): 100 INJECTION, SOLUTION SUBCUTANEOUS at 21:25

## 2022-12-17 RX ADMIN — Medication 2: at 12:10

## 2022-12-17 RX ADMIN — ATORVASTATIN CALCIUM 80 MILLIGRAM(S): 80 TABLET, FILM COATED ORAL at 21:25

## 2022-12-17 RX ADMIN — Medication 12 UNIT(S): at 17:26

## 2022-12-17 RX ADMIN — Medication 50 MILLIGRAM(S): at 06:22

## 2022-12-17 RX ADMIN — LOSARTAN POTASSIUM 25 MILLIGRAM(S): 100 TABLET, FILM COATED ORAL at 06:22

## 2022-12-17 RX ADMIN — CLOPIDOGREL BISULFATE 75 MILLIGRAM(S): 75 TABLET, FILM COATED ORAL at 12:11

## 2022-12-17 NOTE — PROGRESS NOTE ADULT - SUBJECTIVE AND OBJECTIVE BOX
Subjective/Objective: Patient denies chest pain ,sob, dizziness      Tele event: SR/ST     MEDICATIONS  aspirin  chewable 81 milliGRAM(s) Oral daily  clopidogrel Tablet 75 milliGRAM(s) Oral daily  heparin   Injectable 6000 Unit(s) IV Push every 6 hours PRN  heparin   Injectable 3000 Unit(s) IV Push every 6 hours PRN  heparin  Infusion.  Unit(s)/Hr IV Continuous <Continuous>  losartan 25 milliGRAM(s) Oral daily  metoprolol succinate ER 50 milliGRAM(s) Oral daily  acetaminophen     Tablet  650 milliGRAM(s) Oral every 6 hours PRN  atorvastatin 80 milliGRAM(s) Oral at bedtime  colchicine 0.6 milliGRAM(s) Oral every 12 hours  dextrose 50% Injectable 25 Gram(s) IV Push once  dextrose 50% Injectable 12.5 Gram(s) IV Push once  dextrose 50% Injectable 25 Gram(s) IV Push once  dextrose Oral Gel 15 Gram(s) Oral once PRN  glucagon  Injectable 1 milliGRAM(s) IntraMuscular once  insulin glargine Injectable (LANTUS) 26 Unit(s) SubCutaneous at bedtime  insulin lispro (ADMELOG) corrective regimen sliding scale   SubCutaneous at bedtime  insulin lispro (ADMELOG) corrective regimen sliding scale   SubCutaneous three times a day before meals  insulin lispro Injectable (ADMELOG) 12 Unit(s) SubCutaneous three times a day before meals  chlorhexidine 2% Cloths 1 Application(s) Topical <User Schedule>  dextrose 5%. 1000 milliLiter(s) IV Continuous <Continuous>  dextrose 5%. 1000 milliLiter(s) IV Continuous <Continuous>  influenza   Vaccine 0.5 milliLiter(s) IntraMuscular once        REVIEW OF SYSTEMS    General: no fatigue/malaise, weight loss/gain.  Skin: no rashes.  Ophthalmologic: no blurred vision, no loss of vision. 	  ENT: no sore throat, rhinorrhea, sinus congestion.  Cardiovascular:no chest pain ,no palpitation,no dizziness,no diaphoresis,no edema  Respiratory: no SOB, cough or wheeze.  Gastrointestinal:  no N/V/D, no melena/hematemesis  Genitourinary: no dysuria/hesitancy or hematuria.  Musculoskeletal: no myalgias or arthralgias.  Neurological: no changes in vision or hearing, no lightheadedness/dizziness, no syncope/near syncope	  Psychiatric: no unusual stress/anxiety      	    ICU Vital Signs Last 24 Hrs  T(C): 37.1 (17 Dec 2022 06:15), Max: 37.1 (17 Dec 2022 06:15)  T(F): 98.7 (17 Dec 2022 06:15), Max: 98.7 (17 Dec 2022 06:15)  HR: 85 (17 Dec 2022 06:15) (85 - 97)  BP: 109/74 (17 Dec 2022 06:15) (102/70 - 109/79)  RR: 18 (17 Dec 2022 06:15) (18 - 18)  SpO2: 99% (17 Dec 2022 06:15) (99% - 99%)    O2 Parameters below as of 17 Dec 2022 06:15  Patient On (Oxygen Delivery Method): room air            PHYSICAL EXAMINATION  Appearance: NAD, no distress  HEENT: Moist Mucous Membranes, Anicteric, PERRL, EOMI  Cardiovascular: Regular rate and rhythm, Normal S1 S2, No JVD, No murmurs  Respiratory: Lungs clear to auscultation. No rales, No rhonchi, No wheezing. No tenderness to palpation  Gastrointestinal:  Soft, Non-tender, + BS  Neurologic: Non-focal, A&Ox3  Skin: Warm and dry, No rashes, No ecchymosis, No cyanosis  Musculoskeletal: No clubbing, No cyanosis, No edema  Vascular: Peripheral pulses palpable 2+ bilaterally  Psychiatry: Mood & affect appropriate      	  		      I&O's Summary  	     LABS:	  LABORATORY VALUES	 	                          15.6   8.08  )-----------( 225      ( 16 Dec 2022 06:30 )             46.8       12-17    136  |  102  |  13  ----------------------------<  127<H>  4.2   |  24  |  0.79  12-16    135  |  101  |  11  ----------------------------<  133<H>  3.9   |  24  |  0.77    Ca    9.3      17 Dec 2022 06:11  Ca    9.4      16 Dec 2022 06:30  Phos  4.1     12-17  Phos  3.6     12-16  Mg     1.80     12-17  Mg     1.90     12-16        Activated Partial Thromboplastin Time: 106.6 sec (12-17 @ 07:55)        Serum Pro-Brain Natriuretic Peptide: 562 pg/mL (12-11 @ 01:20)      12-11 @ 04:27  Cholesterol, Serum - 192  Direct LDL- --  HDL Cholesterol, Serum- 60  Triglycerides, Serum- 53      Thyroid Stimulating Hormone, Serum: 0.60 uIU/mL (12-11 @ 04:27)        CAPILLARY BLOOD GLUCOSE      POCT Blood Glucose.: 142 mg/dL (17 Dec 2022 07:41)      12-12 @ 02:02  229E Corona Virus --  Adenovirus NotDetec  Bordetella pertussis NotDetec  Chlamydia pneumoniae NotDetec  Entero/Rhino Virus NotDetec  HKU1 Coronavirus --  hMPV NotDetec  Influenza A NotDetec  Influenza AH1 --  Influenza AH1 2009 --  Influenza AH3 --  Influenza B NotDetec  Mycoplasma pneumoniae NotDetec  NL63 Coronavirus --  OC43 Corornavirus --  Parainfluenza 1 NotDetec  Parainfluenza 2 NotDetec              Diagnostic testing:  cath:< from: Cardiac Catheterization (12.11.22 @ 01:56) >  Coronary Angiography .The coronary circulation is right dominant.      LM     Left main artery: Angiography shows minor irregularities.      LAD   Proximal left anterior descending: A large thrombosis is present.  There is a 100 % stenosis. First diagonal: There is a 100 %  stenosis.      CX   Proximal circumflex: There is a 50 % stenosis.      RCA   Proximal right coronary artery: There is a 60 % stenosis. Mid rightcoronary artery: There is a 50 % stenosis.    Left Heart Cath   The anterolateral and apical wall segments are akinetic. Ejectionfraction is 25%.    Interventional Findings:     Interventional Details   Proximal left anterior descending: This was a 100 % thromboticstenosis. Guidewire crossing was successful.      Conclusions: Late presentation anterior wall MI - STEMI. EF 25%. Culprit lesion inthe pLAD and large diagonal. - s/p POBA to the diagonaland stenting of the prox LAD.  Recommendations: DAPT for 12 months.          < from: TTE with Doppler (w/Cont) (12.11.22 @ 08:31) >  DIMENSIONS:  Dimensions:     Normal Values:  LA:     2.7 cm    2.0 - 4.0 cm  Ao:     3.4 cm    2.0 - 3.8 cm  SEPTUM: 0.9 cm 0.6 - 1.2 cm  PWT:    0.9 cm    0.6 - 1.1 cm  LVIDd:  4.4 cm    3.0 - 5.6 cm  LVIDs:    ---     1.8 - 4.0 cm  Derived Variables:  LVMI: 69 g/m2  RWT: 0.40  Ejection Fraction (Modified Kitchen Rule): 30 %  ------------------------------------------------------------------------  OBSERVATIONS:  Mitral Valve: Normal mitral valve.  Aortic Root: Normal aortic root.  Aortic Valve: Normal trileaflet aortic valve.  Left Atrium: Normal left atrium.  LA volume index = 19  cc/m2.  Left Ventricle: Endocardial visualization enhanced with  intravenous injection of echo contrast (Definity). Mid to  distal anterior, mid to distal anterolateral, mid to distal  septal, distal inferior and apical hypokinesis. Severe  segmental left ventricular systolic dysfunction. Normal  left ventricular internal dimensions and wall thicknesses.  Right Heart: Normal right atrium. Normal right ventricular  size. Distal right ventricle hypokinesis. Normal tricuspid  valve. Normal pulmonic valve.  Pericardium/PleuraNormal pericardium with no pericardial  effusion.    < end of copied text >        Echo:TTE with Doppler (w/Cont) (12.13.22 @ 16:48)  Limited repeat study with the intravenous injection of  ultrasound enhancing agent to evaluate for left ventricular  thrombus.  Endocardium not well visualized; grossly  moderate to severe segmental left ventricular systolic  dysfunction.  Hypokinesis of the apex, mid to distal  septum, and mid to distal anterior wall.  Endocardial  visualization enhanced with intravenous injection of echo  contrast (Definity).  A filling defect is seen and is  consistent with laminar apical mural LV thrombus.      Assessment and Plan:43 y/o male [place] male with h/o smoking and pre DM2 presents with chest pain - + AWSTEMI and now s/p JESUS to pLAD and POBA to DIAG.     AWSTEMI  - s/p JESUS to pLAD  - continue DAPT, Lipitor  - continue Toprol 50mg xl daily   - continue losartan 25mg po daily (hold for SBP<90)  - TTE EF 30%, severe LV systolic dysfunction, normal RV   - trend cardiac enzymes until trending down    LV thrombus   - TTE: A filling defect is seen and is consistent with laminar apical mural LV thrombus.  - Heparin to coumadin. INR 1.7. Give coumadin 5mg PO x1 today   - Goal INR 2-3      Dispo:  - Once INR is at goal, discharge home    - Follow up  appt with Dr. Smyth 12/23 at 11:30pm         Subjective/Objective: Patient denies chest pain ,sob, dizziness.      Tele event: SR/ST     MEDICATIONS  aspirin  chewable 81 milliGRAM(s) Oral daily  clopidogrel Tablet 75 milliGRAM(s) Oral daily  heparin   Injectable 6000 Unit(s) IV Push every 6 hours PRN  heparin   Injectable 3000 Unit(s) IV Push every 6 hours PRN  heparin  Infusion.  Unit(s)/Hr IV Continuous <Continuous>  losartan 25 milliGRAM(s) Oral daily  metoprolol succinate ER 50 milliGRAM(s) Oral daily  acetaminophen     Tablet  650 milliGRAM(s) Oral every 6 hours PRN  atorvastatin 80 milliGRAM(s) Oral at bedtime  colchicine 0.6 milliGRAM(s) Oral every 12 hours  dextrose 50% Injectable 25 Gram(s) IV Push once  dextrose 50% Injectable 12.5 Gram(s) IV Push once  dextrose 50% Injectable 25 Gram(s) IV Push once  dextrose Oral Gel 15 Gram(s) Oral once PRN  glucagon  Injectable 1 milliGRAM(s) IntraMuscular once  insulin glargine Injectable (LANTUS) 26 Unit(s) SubCutaneous at bedtime  insulin lispro (ADMELOG) corrective regimen sliding scale   SubCutaneous at bedtime  insulin lispro (ADMELOG) corrective regimen sliding scale   SubCutaneous three times a day before meals  insulin lispro Injectable (ADMELOG) 12 Unit(s) SubCutaneous three times a day before meals  chlorhexidine 2% Cloths 1 Application(s) Topical <User Schedule>  dextrose 5%. 1000 milliLiter(s) IV Continuous <Continuous>  dextrose 5%. 1000 milliLiter(s) IV Continuous <Continuous>  influenza   Vaccine 0.5 milliLiter(s) IntraMuscular once        REVIEW OF SYSTEMS    General: no fatigue/malaise, weight loss/gain.  Skin: no rashes.  Ophthalmologic: no blurred vision, no loss of vision. 	  ENT: no sore throat, rhinorrhea, sinus congestion.  Cardiovascular:no chest pain ,no palpitation,no dizziness,no diaphoresis,no edema  Respiratory: no SOB, cough or wheeze.  Gastrointestinal:  no N/V/D, no melena/hematemesis  Genitourinary: no dysuria/hesitancy or hematuria.  Musculoskeletal: no myalgias or arthralgias.  Neurological: no changes in vision or hearing, no lightheadedness/dizziness, no syncope/near syncope	  Psychiatric: no unusual stress/anxiety      	    ICU Vital Signs Last 24 Hrs  T(C): 37.1 (17 Dec 2022 06:15), Max: 37.1 (17 Dec 2022 06:15)  T(F): 98.7 (17 Dec 2022 06:15), Max: 98.7 (17 Dec 2022 06:15)  HR: 85 (17 Dec 2022 06:15) (85 - 97)  BP: 109/74 (17 Dec 2022 06:15) (102/70 - 109/79)  RR: 18 (17 Dec 2022 06:15) (18 - 18)  SpO2: 99% (17 Dec 2022 06:15) (99% - 99%)    O2 Parameters below as of 17 Dec 2022 06:15  Patient On (Oxygen Delivery Method): room air            PHYSICAL EXAMINATION  Appearance: NAD, no distress  HEENT: Moist Mucous Membranes, Anicteric, PERRL, EOMI  Cardiovascular: Regular rate and rhythm, Normal S1 S2, No JVD, No murmurs  Respiratory: Lungs clear to auscultation. No rales, No rhonchi, No wheezing. No tenderness to palpation  Gastrointestinal:  Soft, Non-tender, + BS  Neurologic: Non-focal, A&Ox3  Skin: Warm and dry, No rashes, No ecchymosis, No cyanosis  Musculoskeletal: No clubbing, No cyanosis, No edema  Vascular: Peripheral pulses palpable 2+ bilaterally  Psychiatry: Mood & affect appropriate      	  		      I&O's Summary  	     LABS:	  LABORATORY VALUES	 	                          15.6   8.08  )-----------( 225      ( 16 Dec 2022 06:30 )             46.8       12-17    136  |  102  |  13  ----------------------------<  127<H>  4.2   |  24  |  0.79  12-16    135  |  101  |  11  ----------------------------<  133<H>  3.9   |  24  |  0.77    Ca    9.3      17 Dec 2022 06:11  Ca    9.4      16 Dec 2022 06:30  Phos  4.1     12-17  Phos  3.6     12-16  Mg     1.80     12-17  Mg     1.90     12-16        Activated Partial Thromboplastin Time: 106.6 sec (12-17 @ 07:55)        Serum Pro-Brain Natriuretic Peptide: 562 pg/mL (12-11 @ 01:20)      12-11 @ 04:27  Cholesterol, Serum - 192  Direct LDL- --  HDL Cholesterol, Serum- 60  Triglycerides, Serum- 53      Thyroid Stimulating Hormone, Serum: 0.60 uIU/mL (12-11 @ 04:27)        CAPILLARY BLOOD GLUCOSE      POCT Blood Glucose.: 142 mg/dL (17 Dec 2022 07:41)      12-12 @ 02:02  229E Corona Virus --  Adenovirus NotDetec  Bordetella pertussis NotDetec  Chlamydia pneumoniae NotDetec  Entero/Rhino Virus NotDetec  HKU1 Coronavirus --  hMPV NotDetec  Influenza A NotDetec  Influenza AH1 --  Influenza AH1 2009 --  Influenza AH3 --  Influenza B NotDetec  Mycoplasma pneumoniae NotDetec  NL63 Coronavirus --  OC43 Corornavirus --  Parainfluenza 1 NotDetec  Parainfluenza 2 NotDetec              Diagnostic testing:  cath:< from: Cardiac Catheterization (12.11.22 @ 01:56) >  Coronary Angiography .The coronary circulation is right dominant.      LM     Left main artery: Angiography shows minor irregularities.      LAD   Proximal left anterior descending: A large thrombosis is present.  There is a 100 % stenosis. First diagonal: There is a 100 %  stenosis.      CX   Proximal circumflex: There is a 50 % stenosis.      RCA   Proximal right coronary artery: There is a 60 % stenosis. Mid rightcoronary artery: There is a 50 % stenosis.    Left Heart Cath   The anterolateral and apical wall segments are akinetic. Ejectionfraction is 25%.    Interventional Findings:     Interventional Details   Proximal left anterior descending: This was a 100 % thromboticstenosis. Guidewire crossing was successful.      Conclusions: Late presentation anterior wall MI - STEMI. EF 25%. Culprit lesion inthe pLAD and large diagonal. - s/p POBA to the diagonaland stenting of the prox LAD.  Recommendations: DAPT for 12 months.          < from: TTE with Doppler (w/Cont) (12.11.22 @ 08:31) >  DIMENSIONS:  Dimensions:     Normal Values:  LA:     2.7 cm    2.0 - 4.0 cm  Ao:     3.4 cm    2.0 - 3.8 cm  SEPTUM: 0.9 cm 0.6 - 1.2 cm  PWT:    0.9 cm    0.6 - 1.1 cm  LVIDd:  4.4 cm    3.0 - 5.6 cm  LVIDs:    ---     1.8 - 4.0 cm  Derived Variables:  LVMI: 69 g/m2  RWT: 0.40  Ejection Fraction (Modified Kitchen Rule): 30 %  ------------------------------------------------------------------------  OBSERVATIONS:  Mitral Valve: Normal mitral valve.  Aortic Root: Normal aortic root.  Aortic Valve: Normal trileaflet aortic valve.  Left Atrium: Normal left atrium.  LA volume index = 19  cc/m2.  Left Ventricle: Endocardial visualization enhanced with  intravenous injection of echo contrast (Definity). Mid to  distal anterior, mid to distal anterolateral, mid to distal  septal, distal inferior and apical hypokinesis. Severe  segmental left ventricular systolic dysfunction. Normal  left ventricular internal dimensions and wall thicknesses.  Right Heart: Normal right atrium. Normal right ventricular  size. Distal right ventricle hypokinesis. Normal tricuspid  valve. Normal pulmonic valve.  Pericardium/PleuraNormal pericardium with no pericardial  effusion.    < end of copied text >        Echo:TTE with Doppler (w/Cont) (12.13.22 @ 16:48)  Limited repeat study with the intravenous injection of  ultrasound enhancing agent to evaluate for left ventricular  thrombus.  Endocardium not well visualized; grossly  moderate to severe segmental left ventricular systolic  dysfunction.  Hypokinesis of the apex, mid to distal  septum, and mid to distal anterior wall.  Endocardial  visualization enhanced with intravenous injection of echo  contrast (Definity).  A filling defect is seen and is  consistent with laminar apical mural LV thrombus.      Assessment and Plan:41 y/o male [place] male with h/o smoking and pre DM2 presents with chest pain - + AWSTEMI and now s/p JESUS to pLAD and POBA to DIAG.     AWSTEMI  - s/p JESUS to pLAD  - continue DAPT, Lipitor  - continue Toprol 50mg xl daily   - continue losartan 25mg po daily (hold for SBP<90)  - TTE EF 30%, severe LV systolic dysfunction, normal RV   - trend cardiac enzymes until trending down    LV thrombus   - TTE: A filling defect is seen and is consistent with laminar apical mural LV thrombus.  - Heparin to coumadin. INR 1.7. Give coumadin 5mg PO x1 today   - Goal INR 2-3      Dispo:  - Once INR is at goal, discharge home    - Follow up  appt with Dr. Smyth 12/23 at 11:30pm         Subjective/Objective: Patient denies chest pain ,sob, dizziness.      Tele event: SR/ST     MEDICATIONS  aspirin  chewable 81 milliGRAM(s) Oral daily  clopidogrel Tablet 75 milliGRAM(s) Oral daily  heparin   Injectable 6000 Unit(s) IV Push every 6 hours PRN  heparin   Injectable 3000 Unit(s) IV Push every 6 hours PRN  heparin  Infusion.  Unit(s)/Hr IV Continuous <Continuous>  losartan 25 milliGRAM(s) Oral daily  metoprolol succinate ER 50 milliGRAM(s) Oral daily  acetaminophen     Tablet  650 milliGRAM(s) Oral every 6 hours PRN  atorvastatin 80 milliGRAM(s) Oral at bedtime  colchicine 0.6 milliGRAM(s) Oral every 12 hours  dextrose 50% Injectable 25 Gram(s) IV Push once  dextrose 50% Injectable 12.5 Gram(s) IV Push once  dextrose 50% Injectable 25 Gram(s) IV Push once  dextrose Oral Gel 15 Gram(s) Oral once PRN  glucagon  Injectable 1 milliGRAM(s) IntraMuscular once  insulin glargine Injectable (LANTUS) 26 Unit(s) SubCutaneous at bedtime  insulin lispro (ADMELOG) corrective regimen sliding scale   SubCutaneous at bedtime  insulin lispro (ADMELOG) corrective regimen sliding scale   SubCutaneous three times a day before meals  insulin lispro Injectable (ADMELOG) 12 Unit(s) SubCutaneous three times a day before meals  chlorhexidine 2% Cloths 1 Application(s) Topical <User Schedule>  dextrose 5%. 1000 milliLiter(s) IV Continuous <Continuous>  dextrose 5%. 1000 milliLiter(s) IV Continuous <Continuous>  influenza   Vaccine 0.5 milliLiter(s) IntraMuscular once        REVIEW OF SYSTEMS    General: no fatigue/malaise, weight loss/gain.  Skin: no rashes.  Ophthalmologic: no blurred vision, no loss of vision. 	  ENT: no sore throat, rhinorrhea, sinus congestion.  Cardiovascular:no chest pain ,no palpitation,no dizziness,no diaphoresis,no edema  Respiratory: no SOB, cough or wheeze.  Gastrointestinal:  no N/V/D, no melena/hematemesis  Genitourinary: no dysuria/hesitancy or hematuria.  Musculoskeletal: no myalgias or arthralgias.  Neurological: no changes in vision or hearing, no lightheadedness/dizziness, no syncope/near syncope	  Psychiatric: no unusual stress/anxiety      	    ICU Vital Signs Last 24 Hrs  T(C): 37.1 (17 Dec 2022 06:15), Max: 37.1 (17 Dec 2022 06:15)  T(F): 98.7 (17 Dec 2022 06:15), Max: 98.7 (17 Dec 2022 06:15)  HR: 85 (17 Dec 2022 06:15) (85 - 97)  BP: 109/74 (17 Dec 2022 06:15) (102/70 - 109/79)  RR: 18 (17 Dec 2022 06:15) (18 - 18)  SpO2: 99% (17 Dec 2022 06:15) (99% - 99%)    O2 Parameters below as of 17 Dec 2022 06:15  Patient On (Oxygen Delivery Method): room air            PHYSICAL EXAMINATION  Appearance: NAD, no distress  HEENT: Moist Mucous Membranes, Anicteric, PERRL, EOMI  Cardiovascular: Regular rate and rhythm, Normal S1 S2, No JVD, No murmurs  Respiratory: Lungs clear to auscultation. No rales, No rhonchi, No wheezing. No tenderness to palpation  Gastrointestinal:  Soft, Non-tender, + BS  Neurologic: Non-focal, A&Ox3  Skin: Warm and dry, No rashes, No ecchymosis, No cyanosis  Musculoskeletal: No clubbing, No cyanosis, No edema  Vascular: Peripheral pulses palpable 2+ bilaterally  Psychiatry: Mood & affect appropriate      	  		      I&O's Summary  	     LABS:	  LABORATORY VALUES	 	                          15.6   8.08  )-----------( 225      ( 16 Dec 2022 06:30 )             46.8       12-17    136  |  102  |  13  ----------------------------<  127<H>  4.2   |  24  |  0.79  12-16    135  |  101  |  11  ----------------------------<  133<H>  3.9   |  24  |  0.77    Ca    9.3      17 Dec 2022 06:11  Ca    9.4      16 Dec 2022 06:30  Phos  4.1     12-17  Phos  3.6     12-16  Mg     1.80     12-17  Mg     1.90     12-16        Activated Partial Thromboplastin Time: 106.6 sec (12-17 @ 07:55)        Serum Pro-Brain Natriuretic Peptide: 562 pg/mL (12-11 @ 01:20)      12-11 @ 04:27  Cholesterol, Serum - 192  Direct LDL- --  HDL Cholesterol, Serum- 60  Triglycerides, Serum- 53      Thyroid Stimulating Hormone, Serum: 0.60 uIU/mL (12-11 @ 04:27)        CAPILLARY BLOOD GLUCOSE      POCT Blood Glucose.: 142 mg/dL (17 Dec 2022 07:41)      12-12 @ 02:02  229E Corona Virus --  Adenovirus NotDetec  Bordetella pertussis NotDetec  Chlamydia pneumoniae NotDetec  Entero/Rhino Virus NotDetec  HKU1 Coronavirus --  hMPV NotDetec  Influenza A NotDetec  Influenza AH1 --  Influenza AH1 2009 --  Influenza AH3 --  Influenza B NotDetec  Mycoplasma pneumoniae NotDetec  NL63 Coronavirus --  OC43 Corornavirus --  Parainfluenza 1 NotDetec  Parainfluenza 2 NotDetec              Diagnostic testing:  cath:< from: Cardiac Catheterization (12.11.22 @ 01:56) >  Coronary Angiography .The coronary circulation is right dominant.      LM     Left main artery: Angiography shows minor irregularities.      LAD   Proximal left anterior descending: A large thrombosis is present.  There is a 100 % stenosis. First diagonal: There is a 100 %  stenosis.      CX   Proximal circumflex: There is a 50 % stenosis.      RCA   Proximal right coronary artery: There is a 60 % stenosis. Mid rightcoronary artery: There is a 50 % stenosis.    Left Heart Cath   The anterolateral and apical wall segments are akinetic. Ejectionfraction is 25%.    Interventional Findings:     Interventional Details   Proximal left anterior descending: This was a 100 % thromboticstenosis. Guidewire crossing was successful.      Conclusions: Late presentation anterior wall MI - STEMI. EF 25%. Culprit lesion inthe pLAD and large diagonal. - s/p POBA to the diagonaland stenting of the prox LAD.  Recommendations: DAPT for 12 months.          < from: TTE with Doppler (w/Cont) (12.11.22 @ 08:31) >  DIMENSIONS:  Dimensions:     Normal Values:  LA:     2.7 cm    2.0 - 4.0 cm  Ao:     3.4 cm    2.0 - 3.8 cm  SEPTUM: 0.9 cm 0.6 - 1.2 cm  PWT:    0.9 cm    0.6 - 1.1 cm  LVIDd:  4.4 cm    3.0 - 5.6 cm  LVIDs:    ---     1.8 - 4.0 cm  Derived Variables:  LVMI: 69 g/m2  RWT: 0.40  Ejection Fraction (Modified Kitchen Rule): 30 %  ------------------------------------------------------------------------  OBSERVATIONS:  Mitral Valve: Normal mitral valve.  Aortic Root: Normal aortic root.  Aortic Valve: Normal trileaflet aortic valve.  Left Atrium: Normal left atrium.  LA volume index = 19  cc/m2.  Left Ventricle: Endocardial visualization enhanced with  intravenous injection of echo contrast (Definity). Mid to  distal anterior, mid to distal anterolateral, mid to distal  septal, distal inferior and apical hypokinesis. Severe  segmental left ventricular systolic dysfunction. Normal  left ventricular internal dimensions and wall thicknesses.  Right Heart: Normal right atrium. Normal right ventricular  size. Distal right ventricle hypokinesis. Normal tricuspid  valve. Normal pulmonic valve.  Pericardium/PleuraNormal pericardium with no pericardial  effusion.    < end of copied text >        Echo:TTE with Doppler (w/Cont) (12.13.22 @ 16:48)  Limited repeat study with the intravenous injection of  ultrasound enhancing agent to evaluate for left ventricular  thrombus.  Endocardium not well visualized; grossly  moderate to severe segmental left ventricular systolic  dysfunction.  Hypokinesis of the apex, mid to distal  septum, and mid to distal anterior wall.  Endocardial  visualization enhanced with intravenous injection of echo  contrast (Definity).  A filling defect is seen and is  consistent with laminar apical mural LV thrombus.      Assessment and Plan:43 y/o male [place] male with h/o smoking and pre DM2 presents with chest pain - + AWSTEMI and now s/p JESUS to pLAD and POBA to DIAG.     AWSTEMI  - s/p JESUS to pLAD  - continue Aspirin, plavix  Lipitor  - continue Toprol 50mg xl daily   - continue losartan 25mg po daily (hold for SBP<90)  - TTE EF 30%, severe LV systolic dysfunction, normal RV   - trend cardiac enzymes until trending down      LV thrombus   - TTE: A filling defect is seen and is consistent with laminar apical mural LV thrombus.  - Heparin to coumadin. INR 1.7. Give coumadin 5mg PO x1 today   - Goal INR 2-3      Dispo:  - Once INR is at goal, discharge home    -pt will d/c home on plavix , asa and coumadin for 1 month  - Follow up  appt with Dr. Smyth 12/23 at 11:30pm

## 2022-12-17 NOTE — PROGRESS NOTE ADULT - ASSESSMENT
42 y/p [place] male with h/o smoking and pre DM2 presents with chest pain - + AW STEMI and now s/p JESUS to pLAD and POBA to diag, course complicated by post-STEMI pericarditis and LV thrombus.

## 2022-12-17 NOTE — PROGRESS NOTE ADULT - PROBLEM SELECTOR PLAN 1
Repeat echo demonstrating LV thrombus  - pt on heparin gtt with bridge to coumadin, DOAC not indicated   - check INR daily  - cards recs appreciated

## 2022-12-17 NOTE — PROGRESS NOTE ADULT - SUBJECTIVE AND OBJECTIVE BOX
Division of Hospital Medicine  Amish Bourne MD  Available via MS Teams  Pager: [phone]    SUBJECTIVE / OVERNIGHT EVENTS:    No new complaints   doing well       MEDICATIONS  (STANDING):  aspirin  chewable 81 milliGRAM(s) Oral daily  atorvastatin 80 milliGRAM(s) Oral at bedtime  chlorhexidine 2% Cloths 1 Application(s) Topical <User Schedule>  clopidogrel Tablet 75 milliGRAM(s) Oral daily  colchicine 0.6 milliGRAM(s) Oral every 12 hours  dextrose 5%. 1000 milliLiter(s) (100 mL/Hr) IV Continuous <Continuous>  dextrose 5%. 1000 milliLiter(s) (50 mL/Hr) IV Continuous <Continuous>  dextrose 50% Injectable 25 Gram(s) IV Push once  dextrose 50% Injectable 12.5 Gram(s) IV Push once  dextrose 50% Injectable 25 Gram(s) IV Push once  glucagon  Injectable 1 milliGRAM(s) IntraMuscular once  heparin  Infusion.  Unit(s)/Hr (13 mL/Hr) IV Continuous <Continuous>  influenza   Vaccine 0.5 milliLiter(s) IntraMuscular once  insulin glargine Injectable (LANTUS) 26 Unit(s) SubCutaneous at bedtime  insulin lispro (ADMELOG) corrective regimen sliding scale   SubCutaneous at bedtime  insulin lispro (ADMELOG) corrective regimen sliding scale   SubCutaneous three times a day before meals  insulin lispro Injectable (ADMELOG) 12 Unit(s) SubCutaneous three times a day before meals  losartan 25 milliGRAM(s) Oral daily  metoprolol succinate ER 50 milliGRAM(s) Oral daily  warfarin 5 milliGRAM(s) Oral once    MEDICATIONS  (PRN):  acetaminophen     Tablet .. 650 milliGRAM(s) Oral every 6 hours PRN Mild Pain (1 - 3), Moderate Pain (4 - 6)  dextrose Oral Gel 15 Gram(s) Oral once PRN Blood Glucose LESS THAN 70 milliGRAM(s)/deciliter  heparin   Injectable 6000 Unit(s) IV Push every 6 hours PRN For aPTT less than 40  heparin   Injectable 3000 Unit(s) IV Push every 6 hours PRN For aPTT between 40 - 57      I&O's Summary      PHYSICAL EXAM:  Vital Signs Last 24 Hrs  T(C): 36.8 (17 Dec 2022 12:00), Max: 37.1 (17 Dec 2022 06:15)  T(F): 98.2 (17 Dec 2022 12:00), Max: 98.7 (17 Dec 2022 06:15)  HR: 91 (17 Dec 2022 12:00) (85 - 97)  BP: 112/71 (17 Dec 2022 12:00) (109/74 - 112/71)  BP(mean): --  RR: 18 (17 Dec 2022 12:00) (18 - 18)  SpO2: 99% (17 Dec 2022 12:00) (99% - 99%)    Parameters below as of 17 Dec 2022 12:00  Patient On (Oxygen Delivery Method): room air      CONSTITUTIONAL: NAD, well-developed  EYES:  conjunctiva and sclera clear  NECK: Supple  RESPIRATORY: Normal respiratory effort; lungs are clear to ascultation bilaterally  CARDIOVASCULAR: Regular rate and rhythm, normal S1 and S2, no murmur/rub/gallop  ABDOMEN: Nontender to palpation, normoactive bowel sounds, no rebound/guarding; No hepatosplenomegaly  MUSCULOSKELETAL:  No lower extremity edema; Peripheral pulses are 2+ bilaterally  PSYCH: A+O to person, place, and time; affect appropriate  NEUROLOGY: no gross motor or sensory deficits   SKIN: No rashes; no palpable lesions    LABS:                        15.5   7.09  )-----------( 262      ( 17 Dec 2022 15:54 )             45.6     12-17    136  |  102  |  13  ----------------------------<  127<H>  4.2   |  24  |  0.79    Ca    9.3      17 Dec 2022 06:11  Phos  4.1     12-17  Mg     1.80     12-17      PT/INR - ( 17 Dec 2022 07:55 )   PT: 20.3 sec;   INR: 1.74 ratio         PTT - ( 17 Dec 2022 15:54 )  PTT:95.8 sec          SARS-CoV-2: NotDetec (12 Dec 2022 02:02)      RADIOLOGY & ADDITIONAL TESTS:  New Results Reviewed Today: Personally reviewed Hg stable on CBC, Cr stable on BMP     COMMUNICATION:  Care Discussed with Consultants/Other Providers and Details of Discussion: Discussed plan of care with ACP

## 2022-12-18 LAB
ANION GAP SERPL CALC-SCNC: 9 MMOL/L — SIGNIFICANT CHANGE UP (ref 7–14)
APTT BLD: 73.3 SEC — HIGH (ref 27–36.3)
BUN SERPL-MCNC: 12 MG/DL — SIGNIFICANT CHANGE UP (ref 7–23)
CALCIUM SERPL-MCNC: 9.4 MG/DL — SIGNIFICANT CHANGE UP (ref 8.4–10.5)
CHLORIDE SERPL-SCNC: 102 MMOL/L — SIGNIFICANT CHANGE UP (ref 98–107)
CO2 SERPL-SCNC: 27 MMOL/L — SIGNIFICANT CHANGE UP (ref 22–31)
CREAT SERPL-MCNC: 0.87 MG/DL — SIGNIFICANT CHANGE UP (ref 0.5–1.3)
EGFR: 111 ML/MIN/1.73M2 — SIGNIFICANT CHANGE UP
GLUCOSE BLDC GLUCOMTR-MCNC: 106 MG/DL — HIGH (ref 70–99)
GLUCOSE BLDC GLUCOMTR-MCNC: 109 MG/DL — HIGH (ref 70–99)
GLUCOSE BLDC GLUCOMTR-MCNC: 139 MG/DL — HIGH (ref 70–99)
GLUCOSE BLDC GLUCOMTR-MCNC: 179 MG/DL — HIGH (ref 70–99)
GLUCOSE SERPL-MCNC: 118 MG/DL — HIGH (ref 70–99)
HCT VFR BLD CALC: 44.8 % — SIGNIFICANT CHANGE UP (ref 39–50)
HGB BLD-MCNC: 14.9 G/DL — SIGNIFICANT CHANGE UP (ref 13–17)
INR BLD: 2.22 RATIO — HIGH (ref 0.88–1.16)
MAGNESIUM SERPL-MCNC: 1.9 MG/DL — SIGNIFICANT CHANGE UP (ref 1.6–2.6)
MCHC RBC-ENTMCNC: 31.5 PG — SIGNIFICANT CHANGE UP (ref 27–34)
MCHC RBC-ENTMCNC: 33.3 GM/DL — SIGNIFICANT CHANGE UP (ref 32–36)
MCV RBC AUTO: 94.7 FL — SIGNIFICANT CHANGE UP (ref 80–100)
NRBC # BLD: 0 /100 WBCS — SIGNIFICANT CHANGE UP (ref 0–0)
NRBC # FLD: 0 K/UL — SIGNIFICANT CHANGE UP (ref 0–0)
PHOSPHATE SERPL-MCNC: 3.5 MG/DL — SIGNIFICANT CHANGE UP (ref 2.5–4.5)
PLATELET # BLD AUTO: 248 K/UL — SIGNIFICANT CHANGE UP (ref 150–400)
POTASSIUM SERPL-MCNC: 4.3 MMOL/L — SIGNIFICANT CHANGE UP (ref 3.5–5.3)
POTASSIUM SERPL-SCNC: 4.3 MMOL/L — SIGNIFICANT CHANGE UP (ref 3.5–5.3)
PROTHROM AB SERPL-ACNC: 26 SEC — HIGH (ref 10.5–13.4)
RBC # BLD: 4.73 M/UL — SIGNIFICANT CHANGE UP (ref 4.2–5.8)
RBC # FLD: 11.9 % — SIGNIFICANT CHANGE UP (ref 10.3–14.5)
SODIUM SERPL-SCNC: 138 MMOL/L — SIGNIFICANT CHANGE UP (ref 135–145)
WBC # BLD: 7.26 K/UL — SIGNIFICANT CHANGE UP (ref 3.8–10.5)
WBC # FLD AUTO: 7.26 K/UL — SIGNIFICANT CHANGE UP (ref 3.8–10.5)

## 2022-12-18 PROCEDURE — 99233 SBSQ HOSP IP/OBS HIGH 50: CPT

## 2022-12-18 RX ORDER — INSULIN GLARGINE 100 [IU]/ML
26 INJECTION, SOLUTION SUBCUTANEOUS
Qty: 5 | Refills: 0
Start: 2022-12-18 | End: 2023-01-16

## 2022-12-18 RX ORDER — METFORMIN HYDROCHLORIDE 850 MG/1
1 TABLET ORAL
Qty: 60 | Refills: 0
Start: 2022-12-18 | End: 2023-01-16

## 2022-12-18 RX ORDER — EMPAGLIFLOZIN 10 MG/1
1 TABLET, FILM COATED ORAL
Qty: 30 | Refills: 0
Start: 2022-12-18 | End: 2023-01-16

## 2022-12-18 RX ORDER — WARFARIN SODIUM 2.5 MG/1
5 TABLET ORAL ONCE
Refills: 0 | Status: COMPLETED | OUTPATIENT
Start: 2022-12-18 | End: 2022-12-18

## 2022-12-18 RX ORDER — ISOPROPYL ALCOHOL, BENZOCAINE .7; .06 ML/ML; ML/ML
1 SWAB TOPICAL
Qty: 100 | Refills: 1
Start: 2022-12-18 | End: 2023-02-05

## 2022-12-18 RX ADMIN — Medication 12 UNIT(S): at 12:02

## 2022-12-18 RX ADMIN — Medication 81 MILLIGRAM(S): at 12:00

## 2022-12-18 RX ADMIN — Medication 12 UNIT(S): at 17:49

## 2022-12-18 RX ADMIN — Medication 50 MILLIGRAM(S): at 05:16

## 2022-12-18 RX ADMIN — Medication 0.6 MILLIGRAM(S): at 05:15

## 2022-12-18 RX ADMIN — HEPARIN SODIUM 1100 UNIT(S)/HR: 5000 INJECTION INTRAVENOUS; SUBCUTANEOUS at 08:00

## 2022-12-18 RX ADMIN — Medication 1: at 12:02

## 2022-12-18 RX ADMIN — ATORVASTATIN CALCIUM 80 MILLIGRAM(S): 80 TABLET, FILM COATED ORAL at 22:20

## 2022-12-18 RX ADMIN — CHLORHEXIDINE GLUCONATE 1 APPLICATION(S): 213 SOLUTION TOPICAL at 05:32

## 2022-12-18 RX ADMIN — WARFARIN SODIUM 5 MILLIGRAM(S): 2.5 TABLET ORAL at 23:43

## 2022-12-18 RX ADMIN — Medication 12 UNIT(S): at 08:40

## 2022-12-18 RX ADMIN — CLOPIDOGREL BISULFATE 75 MILLIGRAM(S): 75 TABLET, FILM COATED ORAL at 12:01

## 2022-12-18 RX ADMIN — LOSARTAN POTASSIUM 25 MILLIGRAM(S): 100 TABLET, FILM COATED ORAL at 05:17

## 2022-12-18 RX ADMIN — INSULIN GLARGINE 26 UNIT(S): 100 INJECTION, SOLUTION SUBCUTANEOUS at 22:14

## 2022-12-18 RX ADMIN — HEPARIN SODIUM 1100 UNIT(S)/HR: 5000 INJECTION INTRAVENOUS; SUBCUTANEOUS at 00:32

## 2022-12-18 NOTE — PROGRESS NOTE ADULT - ASSESSMENT
42 y/p [place]ots[place] male with h/o smoking and pre DM2 presents with chest pain - + AW STEMI and now s/p JESUS to pLAD and POBA to diag, course complicated by post-STEMI pericarditis and LV thrombus. Now pending therapeutic INR on coumadin.

## 2022-12-18 NOTE — PROGRESS NOTE ADULT - NS ATTEND AMEND GEN_ALL_CORE FT
Patient is a 42 year old [place]raini male with AWMI--taken to [facility] and sp PCI to pLAD and POBA to diag. No new events  TTE with severe LV dysfunction  -Continue DAPT with ASA/Brilinta  -continue statin therapy  -continue metoprolol  -add ARB  -repeat tte to eval for LV thrombus
Patient is a 42 year old Hungarian male with AWMI--taken to [facility] and sp PCI to [place] and POBA to stefanie.   TTE with severe LV dysfunction and LV thrombus -heparin started; coumadin dosed  -Continue DAPT with ASA/Plavix  -continue statin therapy  -continue metoprolol  -continue losartan  -Now requires heparin-->coumadin, follow INR -goal 2-3
Patient is a 42 year old Lebanese male with AWMI--taken to [facility] and sp PCI to [place] and POBA to stefanie.   TTE with severe LV dysfunction and LV thrombus -heparin started; coumadin dosed  -Continue DAPT with ASA/Brilinta  -continue statin therapy  -continue metoprolol  -continue losartan  -Now requires heparin-->coumadin  -will discuss triple therapy with ASA/Brilinta and coumadin with interventionalist
Patient is a 42 year old Algerian male with AWMI--taken to [facility] and sp PCI to pLAD and POBA to diag. No new events  TTE with severe LV dysfunction  -Continue DAPT with ASA/Brilinta  -continue statin therapy  -continue metoprolol  -continue losartan  -repeat tte to eval for LV thrombus- done today- will followup results
Patient is a 42 year old Swedish male with AWMI--taken to [facility] and sp PCI to [place] and POBA to stefanie.   TTE with severe LV dysfunction and LV thrombus -heparin started; coumadin dosed  -Continue DAPT with ASA/Plavix  -continue statin therapy  -continue metoprolol  -continue losartan  -Now requires heparin-->coumadin
Patient is a 42 year old Jamaican male with AWMI--taken to [facility] and sp PCI to [place] and POBA to stefanie.   TTE with severe LV dysfunction and LV thrombus -heparin started; coumadin dosed  -Continue DAPT with ASA/Brilinta  -continue statin therapy  -continue metoprolol  -continue losartan  -Now requires heparin-->coumadin  -will discuss triple therapy with ASA/Brilinta and coumadin with interventionalist

## 2022-12-18 NOTE — PROGRESS NOTE ADULT - SUBJECTIVE AND OBJECTIVE BOX
Dante Hughes NP  CCU Service  Cardiology   Spect: [phone] ([facility])     PATIENT: SHERINE GONZALEZ, MRN: [id number]    CHIEF COMPLAINT: Patient is a 42y old  Male who presents with a chief complaint of chest pain (18 Dec 2022 08:28)      INTERVAL HISTORY/OVERNIGHT EVENTS: No overnight events. Denies abdominal pain, chest pain or SOB, cough. Has been ambulating without assistance. Oriented to person, place, and time. Breathing comfortably on room air.    REVIEW OF SYSTEMS:    Constitutional:     [ ] negative [ ] fevers [ ] chills [ ] weight loss [ ] weight gain  HEENT:                  [ ] negative [ ] dry eyes [ ] eye irritation [ ] postnasal drip [ ] nasal congestion  CV:                         [ ] negative  [ ] chest pain [ ] orthopnea [ ] palpitations [ ] murmur  Resp:                     [ ] negative [ ] cough [ ] shortness of breath [ ] dyspnea [ ] wheezing [ ] sputum [ ] hemoptysis  GI:                          [ ] negative [ ] nausea [ ] vomiting [ ] diarrhea [ ] constipation [ ] abd pain [ ] dysphagia   :                        [ ] negative [ ] dysuria [ ] nocturia [ ] hematuria [ ] increased urinary frequency  Musculoskeletal: [ ] negative [ ] back pain [ ] myalgias [ ] arthralgias [ ] fracture  Skin:                       [ ] negative [ ] rash [ ] itch  Neurological:        [ ] negative [ ] headache [ ] dizziness [ ] syncope [ ] weakness [ ] numbness  Psychiatric:           [ ] negative [ ] anxiety [ ] depression  Endocrine:            [ ] negative [x] diabetes [ ] thyroid problem  Heme/Lymph:      [ ] negative [ ] anemia [ ] bleeding problem  Allergic/Immune: [ ] negative [ ] itchy eyes [ ] nasal discharge [ ] hives [ ] angioedema    [x] All other systems negative  [ ] Unable to assess ROS because ________.    MEDICATIONS:  MEDICATIONS  (STANDING):  aspirin  chewable 81 milliGRAM(s) Oral daily  atorvastatin 80 milliGRAM(s) Oral at bedtime  chlorhexidine 2% Cloths 1 Application(s) Topical <User Schedule>  clopidogrel Tablet 75 milliGRAM(s) Oral daily  colchicine 0.6 milliGRAM(s) Oral every 12 hours  heparin  Infusion.  Unit(s)/Hr (13 mL/Hr) IV Continuous <Continuous>  influenza   Vaccine 0.5 milliLiter(s) IntraMuscular once  insulin glargine Injectable (LANTUS) 26 Unit(s) SubCutaneous at bedtime  insulin lispro (ADMELOG) corrective regimen sliding scale   SubCutaneous at bedtime  insulin lispro (ADMELOG) corrective regimen sliding scale   SubCutaneous three times a day before meals  insulin lispro Injectable (ADMELOG) 12 Unit(s) SubCutaneous three times a day before meals  losartan 25 milliGRAM(s) Oral daily  metoprolol succinate ER 50 milliGRAM(s) Oral daily    MEDICATIONS  (PRN):  acetaminophen     Tablet .. 650 milliGRAM(s) Oral every 6 hours PRN Mild Pain (1 - 3), Moderate Pain (4 - 6)  dextrose Oral Gel 15 Gram(s) Oral once PRN Blood Glucose LESS THAN 70 milliGRAM(s)/deciliter  heparin   Injectable 6000 Unit(s) IV Push every 6 hours PRN For aPTT less than 40  heparin   Injectable 3000 Unit(s) IV Push every 6 hours PRN For aPTT between 40 - 57      ALLERGIES: Allergies    No Known Allergies    Intolerances        OBJECTIVE:  ICU Vital Signs Last 24 Hrs  T(C): 36.5 (18 Dec 2022 05:10), Max: 36.8 (17 Dec 2022 12:00)  T(F): 97.7 (18 Dec 2022 05:10), Max: 98.3 (17 Dec 2022 20:07)  HR: 83 (18 Dec 2022 05:10) (83 - 95)  BP: 106/77 (18 Dec 2022 05:10) (106/77 - 112/71)  RR: 17 (18 Dec 2022 05:10) (17 - 20)  SpO2: 99% (18 Dec 2022 05:10) (98% - 99%)    O2 Parameters below as of 18 Dec 2022 05:10  Patient On (Oxygen Delivery Method): room air    CAPILLARY BLOOD GLUCOSE      POCT Blood Glucose.: 106 mg/dL (18 Dec 2022 07:46)  POCT Blood Glucose.: 205 mg/dL (17 Dec 2022 20:36)  POCT Blood Glucose.: 135 mg/dL (17 Dec 2022 16:41)  POCT Blood Glucose.: 220 mg/dL (17 Dec 2022 11:32)    CAPILLARY BLOOD GLUCOSE      POCT Blood Glucose.: 106 mg/dL (18 Dec 2022 07:46)    I&O's Summary    Daily     Daily     PHYSICAL EXAMINATION:  General: Comfortable, no acute distress, cooperative with exam.  HEENT: Moist mucous membranes.  Respiratory: CTAB, normal respiratory effort, no coughing, wheezes, crackles, or rales.  CV: RRR, S1S2, no murmurs, rubs or gallops. No JVD. Distal pulses intact.  Abdominal: Soft, nontender, nondistended, no rebound or guarding, normal bowel sounds.  Neurology: AOx3, no focal neuro defects, MELO x 4.  Extremities: No pitting edema, + Peripheral pulses.  Incisions:   Tubes:    LABS:                          14.9   7.26  )-----------( 248      ( 18 Dec 2022 07:46 )             44.8     12-18    138  |  102  |  12  ----------------------------<  118<H>  4.3   |  27  |  0.87    Ca    9.4      18 Dec 2022 07:46  Phos  3.5     12-18  Mg     1.90     12-18        PT/INR - ( 17 Dec 2022 07:55 )   PT: 20.3 sec;   INR: 1.74 ratio         PTT - ( 17 Dec 2022 23:30 )  PTT:73.3 sec    TELEMETRY: NSR    EKG:     IMAGING:     TTE with Doppler (w/Cont) (12.13.22 @ 16:48)   CONCLUSIONS:  Limited repeat study with the intravenous injection of  ultrasound enhancing agent to evaluate for left ventricular  thrombus.  Endocardium not well visualized; grossly  moderate to severe segmental left ventricular systolic  dysfunction.  Hypokinesis of the apex, mid to distal  septum, and mid to distal anterior wall.  Endocardial  visualization enhanced with intravenous injection of echo  contrast (Definity).  A filling defect is seen and is

## 2022-12-18 NOTE — PROGRESS NOTE ADULT - SUBJECTIVE AND OBJECTIVE BOX
Division of Hospital Medicine  Amish Bourne MD  Available via MS Teams  Pager: [phone]    SUBJECTIVE / OVERNIGHT EVENTS:    No complaints at all     MEDICATIONS  (STANDING):  aspirin  chewable 81 milliGRAM(s) Oral daily  atorvastatin 80 milliGRAM(s) Oral at bedtime  chlorhexidine 2% Cloths 1 Application(s) Topical <User Schedule>  clopidogrel Tablet 75 milliGRAM(s) Oral daily  dextrose 5%. 1000 milliLiter(s) (50 mL/Hr) IV Continuous <Continuous>  dextrose 5%. 1000 milliLiter(s) (100 mL/Hr) IV Continuous <Continuous>  dextrose 50% Injectable 25 Gram(s) IV Push once  dextrose 50% Injectable 12.5 Gram(s) IV Push once  dextrose 50% Injectable 25 Gram(s) IV Push once  glucagon  Injectable 1 milliGRAM(s) IntraMuscular once  influenza   Vaccine 0.5 milliLiter(s) IntraMuscular once  insulin glargine Injectable (LANTUS) 26 Unit(s) SubCutaneous at bedtime  insulin lispro (ADMELOG) corrective regimen sliding scale   SubCutaneous at bedtime  insulin lispro (ADMELOG) corrective regimen sliding scale   SubCutaneous three times a day before meals  insulin lispro Injectable (ADMELOG) 12 Unit(s) SubCutaneous three times a day before meals  losartan 25 milliGRAM(s) Oral daily  metoprolol succinate ER 50 milliGRAM(s) Oral daily  warfarin 5 milliGRAM(s) Oral once    MEDICATIONS  (PRN):  acetaminophen     Tablet .. 650 milliGRAM(s) Oral every 6 hours PRN Mild Pain (1 - 3), Moderate Pain (4 - 6)  dextrose Oral Gel 15 Gram(s) Oral once PRN Blood Glucose LESS THAN 70 milliGRAM(s)/deciliter      I&O's Summary      PHYSICAL EXAM:  Vital Signs Last 24 Hrs  T(C): 36.9 (18 Dec 2022 11:42), Max: 36.9 (18 Dec 2022 11:42)  T(F): 98.5 (18 Dec 2022 11:42), Max: 98.5 (18 Dec 2022 11:42)  HR: 84 (18 Dec 2022 11:42) (83 - 95)  BP: 110/66 (18 Dec 2022 11:42) (106/77 - 111/72)  BP(mean): --  RR: 17 (18 Dec 2022 11:42) (17 - 20)  SpO2: 100% (18 Dec 2022 11:42) (98% - 100%)    Parameters below as of 18 Dec 2022 11:42  Patient On (Oxygen Delivery Method): room air      CONSTITUTIONAL: NAD, well-developed  EYES:  conjunctiva and sclera clear  NECK: Supple  RESPIRATORY: Normal respiratory effort; lungs are clear to ascultation bilaterally  CARDIOVASCULAR: Regular rate and rhythm, normal S1 and S2, no murmur/rub/gallop  ABDOMEN: Nontender to palpation, normoactive bowel sounds, no rebound/guarding; No hepatosplenomegaly  MUSCULOSKELETAL:  No lower extremity edema; Peripheral pulses are 2+ bilaterally  PSYCH: A+O to person, place, and time; affect appropriate  NEUROLOGY: no gross motor or sensory deficits   SKIN: No rashes; no palpable lesions    LABS:                        14.9   7.26  )-----------( 248      ( 18 Dec 2022 07:46 )             44.8     12-18    138  |  102  |  12  ----------------------------<  118<H>  4.3   |  27  |  0.87    Ca    9.4      18 Dec 2022 07:46  Phos  3.5     12-18  Mg     1.90     12-18      PT/INR - ( 18 Dec 2022 12:00 )   PT: 26.0 sec;   INR: 2.22 ratio         PTT - ( 17 Dec 2022 23:30 )  PTT:73.3 sec          SARS-CoV-2: NotDetec (12 Dec 2022 02:02)      RADIOLOGY & ADDITIONAL TESTS:  New Results Reviewed Today: Personally reviewed Hg stable on CBC, Cr stable on BMP     COMMUNICATION:  Care Discussed with Consultants/Other Providers and Details of Discussion: Discussed plan of care with ACP

## 2022-12-18 NOTE — PROGRESS NOTE ADULT - PROBLEM SELECTOR PLAN 1
Repeat echo demonstrating LV thrombus  - pt on heparin gtt with bridge to coumadin, DOAC not indicated   - check INR daily, goal 2-3  - cards recs appreciated

## 2022-12-18 NOTE — PROGRESS NOTE ADULT - ASSESSMENT
41 y/o male [place] male with h/o smoking and pre DM2 presents with chest pain - + AWSTEMI and now s/p JESUS to pLAD and POBA to DIAG.     AWSTEMI  - s/p JESUS to pLAD  - continue Aspirin, plavix  Lipitor  - continue Toprol 50mg xl daily   - continue losartan 25mg po daily (hold for SBP<90)  - TTE EF 30%, severe LV systolic dysfunction, normal RV   - trend cardiac enzymes until trending down  - D/c colchicine 0.6mg 12 hours      LV thrombus   - TTE: A filling defect is seen and is consistent with laminar apical mural LV thrombus.  - Heparin to coumadin. Pending INR this morning. Dose coumadin  - Goal INR 2-3      Dispo:  - Once INR is at goal, discharge home    - Will d/c home on plavix , asa and coumadin for 1 month  - Follow up  appt with Dr. Smyth 12/23 at 11:30pm

## 2022-12-18 NOTE — CHART NOTE - NSCHARTNOTESELECT_GEN_ALL_CORE
Event Note
Event Note
card/Event Note
Endocrine
Event Note
Transfer Note
femoral sheath removed/Event Note

## 2022-12-18 NOTE — CHART NOTE - NSCHARTNOTEFT_GEN_A_CORE
Called for discharge recommendations - see last progress note for complete plan of care   Discharge Plan:   Recommend basal + oral regimen for discharge  Patient will need new prescription for basal insulin PEN, options are: Lantus, Basaglar, Tresiba, Toujeo, Semglee   Would discharge on current dose of 26 units QHS  START Metformin 500 mg PO BID, increase to 1000 mg PO BID after 1 week (note, although he has CHF with reduced EF, he is currently stable with acceptable GFR and lactate levels. Ok to use Metformin)   START SGLT2i: Jardiance 10 mg PO daily OR Farxiga 5 mg PO daily (depending on insurance)- this will provide further glycemic control and also reduce complications and hospitalizations from CHF   STOP Janumet (per patient he was not recently taking)  Can consider GLP1RA as outpatient   In regards to SGLT2i, patient reports +hx of kidney stone, no hx of UTI or genital mycotic infections  Please prescribe a new glucometer, glucose test strips, lancets, alcohol swabs and insulin PEN needles    Followup with Endocrine Practice at [address] [place] [address], [address], [place] [address];  # [phone]  Discussed with primary team   Endocrine      CAPILLARY BLOOD GLUCOSE  POCT Blood Glucose.: 179 mg/dL (18 Dec 2022 11:38)  POCT Blood Glucose.: 106 mg/dL (18 Dec 2022 07:46)  POCT Blood Glucose.: 205 mg/dL (17 Dec 2022 20:36)  POCT Blood Glucose.: 135 mg/dL (17 Dec 2022 16:41)    12-18    138  |  102  |  12  ----------------------------<  118<H>  4.3   |  27  |  0.87    Ca    9.4      18 Dec 2022 07:46  Phos  3.5     12-18  Mg     1.90     12-18    A1C with Estimated Average Glucose Result: 10.1 % (12-11-22 @ 04:27)  A1C with Estimated Average Glucose Result: 10.2 % (12-11-22 @ 01:20)    MEDICATIONS  (STANDING):  aspirin  chewable 81 milliGRAM(s) Oral daily  atorvastatin 80 milliGRAM(s) Oral at bedtime  chlorhexidine 2% Cloths 1 Application(s) Topical <User Schedule>  clopidogrel Tablet 75 milliGRAM(s) Oral daily  dextrose 5%. 1000 milliLiter(s) (50 mL/Hr) IV Continuous <Continuous>  dextrose 5%. 1000 milliLiter(s) (100 mL/Hr) IV Continuous <Continuous>  dextrose 50% Injectable 25 Gram(s) IV Push once  dextrose 50% Injectable 12.5 Gram(s) IV Push once  dextrose 50% Injectable 25 Gram(s) IV Push once  glucagon  Injectable 1 milliGRAM(s) IntraMuscular once  influenza   Vaccine 0.5 milliLiter(s) IntraMuscular once  insulin glargine Injectable (LANTUS) 26 Unit(s) SubCutaneous at bedtime  insulin lispro (ADMELOG) corrective regimen sliding scale   SubCutaneous at bedtime  insulin lispro (ADMELOG) corrective regimen sliding scale   SubCutaneous three times a day before meals  insulin lispro Injectable (ADMELOG) 12 Unit(s) SubCutaneous three times a day before meals  losartan 25 milliGRAM(s) Oral daily  metoprolol succinate ER 50 milliGRAM(s) Oral daily  warfarin 5 milliGRAM(s) Oral once Called for discharge recommendations - see last progress note for complete plan of care   Discharge Plan:   Recommend basal + oral regimen for discharge  Patient will need new prescription for basal insulin PEN, options are: Lantus, Basaglar, Tresiba, Toujeo, Semglee   Would discharge on current dose of 26 units QHS  START Metformin 500 mg PO BID, increase to 1000 mg PO BID after 1 week (note, although he has CHF with reduced EF, he is currently stable with acceptable GFR and lactate levels. Ok to use Metformin)   START SGLT2i: Jardiance 10 mg PO daily OR Farxiga 5 mg PO daily (depending on insurance)- this will provide further glycemic control and also reduce complications and hospitalizations from CHF   STOP Janumet (per patient he was not recently taking)  Can consider GLP1RA as outpatient   Please prescribe a new glucometer, glucose test strips, lancets, alcohol swabs and insulin PEN needles    Followup with Endocrine Practice at [address] [place] [address], [address], [place] [address];  # [phone]  Discussed with primary team   Endocrine      CAPILLARY BLOOD GLUCOSE  POCT Blood Glucose.: 179 mg/dL (18 Dec 2022 11:38)  POCT Blood Glucose.: 106 mg/dL (18 Dec 2022 07:46)  POCT Blood Glucose.: 205 mg/dL (17 Dec 2022 20:36)  POCT Blood Glucose.: 135 mg/dL (17 Dec 2022 16:41)    12-18    138  |  102  |  12  ----------------------------<  118<H>  4.3   |  27  |  0.87    Ca    9.4      18 Dec 2022 07:46  Phos  3.5     12-18  Mg     1.90     12-18    A1C with Estimated Average Glucose Result: 10.1 % (12-11-22 @ 04:27)  A1C with Estimated Average Glucose Result: 10.2 % (12-11-22 @ 01:20)    MEDICATIONS  (STANDING):  aspirin  chewable 81 milliGRAM(s) Oral daily  atorvastatin 80 milliGRAM(s) Oral at bedtime  chlorhexidine 2% Cloths 1 Application(s) Topical <User Schedule>  clopidogrel Tablet 75 milliGRAM(s) Oral daily  dextrose 5%. 1000 milliLiter(s) (50 mL/Hr) IV Continuous <Continuous>  dextrose 5%. 1000 milliLiter(s) (100 mL/Hr) IV Continuous <Continuous>  dextrose 50% Injectable 25 Gram(s) IV Push once  dextrose 50% Injectable 12.5 Gram(s) IV Push once  dextrose 50% Injectable 25 Gram(s) IV Push once  glucagon  Injectable 1 milliGRAM(s) IntraMuscular once  influenza   Vaccine 0.5 milliLiter(s) IntraMuscular once  insulin glargine Injectable (LANTUS) 26 Unit(s) SubCutaneous at bedtime  insulin lispro (ADMELOG) corrective regimen sliding scale   SubCutaneous at bedtime  insulin lispro (ADMELOG) corrective regimen sliding scale   SubCutaneous three times a day before meals  insulin lispro Injectable (ADMELOG) 12 Unit(s) SubCutaneous three times a day before meals  losartan 25 milliGRAM(s) Oral daily  metoprolol succinate ER 50 milliGRAM(s) Oral daily  warfarin 5 milliGRAM(s) Oral once Called for discharge recommendations - see last progress note for complete plan of care   Discharge Plan:   Recommend basal + oral regimen for discharge  Patient will need new prescription for basal insulin PEN, options are: Lantus, Basaglar, Tresiba, Toujeo, Semglee   Would discharge on current dose of 26 units QHS  START Metformin 500 mg PO BID, increase to 1000 mg PO BID after 1 week (note, although he has CHF with reduced EF, he is currently stable with acceptable GFR and lactate levels. Ok to use Metformin)   START SGLT2i: Jardiance 10 mg PO daily OR Farxiga 5 mg PO daily (depending on insurance)- this will provide further glycemic control and also reduce complications and hospitalizations from CHF (if coverage determination cannot be made today: Sunday, and patient needs to be discharged today then this medication can be added as outpatient. Otherwise, please confirm coverage Monday prior to discharge to start upon discharge).   STOP Janumet (per patient he was not recently taking)  Can consider GLP1RA as outpatient   Please prescribe a new glucometer, glucose test strips, lancets, alcohol swabs and insulin PEN needles    Followup with Endocrine Practice at [address] [place] [address], [address], [place] [address]; Ph # [phone]  Discussed with primary team   Endocrine      CAPILLARY BLOOD GLUCOSE  POCT Blood Glucose.: 179 mg/dL (18 Dec 2022 11:38)  POCT Blood Glucose.: 106 mg/dL (18 Dec 2022 07:46)  POCT Blood Glucose.: 205 mg/dL (17 Dec 2022 20:36)  POCT Blood Glucose.: 135 mg/dL (17 Dec 2022 16:41)    12-18    138  |  102  |  12  ----------------------------<  118<H>  4.3   |  27  |  0.87    Ca    9.4      18 Dec 2022 07:46  Phos  3.5     12-18  Mg     1.90     12-18    A1C with Estimated Average Glucose Result: 10.1 % (12-11-22 @ 04:27)  A1C with Estimated Average Glucose Result: 10.2 % (12-11-22 @ 01:20)    MEDICATIONS  (STANDING):  aspirin  chewable 81 milliGRAM(s) Oral daily  atorvastatin 80 milliGRAM(s) Oral at bedtime  chlorhexidine 2% Cloths 1 Application(s) Topical <User Schedule>  clopidogrel Tablet 75 milliGRAM(s) Oral daily  dextrose 5%. 1000 milliLiter(s) (50 mL/Hr) IV Continuous <Continuous>  dextrose 5%. 1000 milliLiter(s) (100 mL/Hr) IV Continuous <Continuous>  dextrose 50% Injectable 25 Gram(s) IV Push once  dextrose 50% Injectable 12.5 Gram(s) IV Push once  dextrose 50% Injectable 25 Gram(s) IV Push once  glucagon  Injectable 1 milliGRAM(s) IntraMuscular once  influenza   Vaccine 0.5 milliLiter(s) IntraMuscular once  insulin glargine Injectable (LANTUS) 26 Unit(s) SubCutaneous at bedtime  insulin lispro (ADMELOG) corrective regimen sliding scale   SubCutaneous at bedtime  insulin lispro (ADMELOG) corrective regimen sliding scale   SubCutaneous three times a day before meals  insulin lispro Injectable (ADMELOG) 12 Unit(s) SubCutaneous three times a day before meals  losartan 25 milliGRAM(s) Oral daily  metoprolol succinate ER 50 milliGRAM(s) Oral daily  warfarin 5 milliGRAM(s) Oral once

## 2022-12-18 NOTE — PROGRESS NOTE ADULT - PROBLEM SELECTOR PLAN 7
- DVT ppx: therapeutic hep ggt  - Diet: regular  - Dispo: home pending therapeutic INR - DVT ppx: coumadin   - Diet: regular  - Dispo: tomorrow

## 2022-12-19 ENCOUNTER — TRANSCRIPTION ENCOUNTER (OUTPATIENT)
Age: 42
End: 2022-12-19

## 2022-12-19 VITALS
OXYGEN SATURATION: 100 % | TEMPERATURE: 98 F | RESPIRATION RATE: 18 BRPM | DIASTOLIC BLOOD PRESSURE: 74 MMHG | HEART RATE: 84 BPM | SYSTOLIC BLOOD PRESSURE: 114 MMHG

## 2022-12-19 LAB
ANION GAP SERPL CALC-SCNC: 8 MMOL/L — SIGNIFICANT CHANGE UP (ref 7–14)
APTT BLD: 38.8 SEC — HIGH (ref 27–36.3)
BUN SERPL-MCNC: 13 MG/DL — SIGNIFICANT CHANGE UP (ref 7–23)
CALCIUM SERPL-MCNC: 9.1 MG/DL — SIGNIFICANT CHANGE UP (ref 8.4–10.5)
CHLORIDE SERPL-SCNC: 102 MMOL/L — SIGNIFICANT CHANGE UP (ref 98–107)
CO2 SERPL-SCNC: 27 MMOL/L — SIGNIFICANT CHANGE UP (ref 22–31)
CREAT SERPL-MCNC: 0.86 MG/DL — SIGNIFICANT CHANGE UP (ref 0.5–1.3)
EGFR: 112 ML/MIN/1.73M2 — SIGNIFICANT CHANGE UP
GLUCOSE BLDC GLUCOMTR-MCNC: 121 MG/DL — HIGH (ref 70–99)
GLUCOSE BLDC GLUCOMTR-MCNC: 123 MG/DL — HIGH (ref 70–99)
GLUCOSE BLDC GLUCOMTR-MCNC: 151 MG/DL — HIGH (ref 70–99)
GLUCOSE SERPL-MCNC: 178 MG/DL — HIGH (ref 70–99)
HCT VFR BLD CALC: 44.1 % — SIGNIFICANT CHANGE UP (ref 39–50)
HGB BLD-MCNC: 14.2 G/DL — SIGNIFICANT CHANGE UP (ref 13–17)
INR BLD: 2.16 RATIO — HIGH (ref 0.88–1.16)
MAGNESIUM SERPL-MCNC: 1.9 MG/DL — SIGNIFICANT CHANGE UP (ref 1.6–2.6)
MCHC RBC-ENTMCNC: 30.7 PG — SIGNIFICANT CHANGE UP (ref 27–34)
MCHC RBC-ENTMCNC: 32.2 GM/DL — SIGNIFICANT CHANGE UP (ref 32–36)
MCV RBC AUTO: 95.5 FL — SIGNIFICANT CHANGE UP (ref 80–100)
NRBC # BLD: 0 /100 WBCS — SIGNIFICANT CHANGE UP (ref 0–0)
NRBC # FLD: 0 K/UL — SIGNIFICANT CHANGE UP (ref 0–0)
PHOSPHATE SERPL-MCNC: 3.6 MG/DL — SIGNIFICANT CHANGE UP (ref 2.5–4.5)
PLATELET # BLD AUTO: 257 K/UL — SIGNIFICANT CHANGE UP (ref 150–400)
POTASSIUM SERPL-MCNC: 4.1 MMOL/L — SIGNIFICANT CHANGE UP (ref 3.5–5.3)
POTASSIUM SERPL-SCNC: 4.1 MMOL/L — SIGNIFICANT CHANGE UP (ref 3.5–5.3)
PROTHROM AB SERPL-ACNC: 25.3 SEC — HIGH (ref 10.5–13.4)
RBC # BLD: 4.62 M/UL — SIGNIFICANT CHANGE UP (ref 4.2–5.8)
RBC # FLD: 12 % — SIGNIFICANT CHANGE UP (ref 10.3–14.5)
SODIUM SERPL-SCNC: 137 MMOL/L — SIGNIFICANT CHANGE UP (ref 135–145)
WBC # BLD: 7.63 K/UL — SIGNIFICANT CHANGE UP (ref 3.8–10.5)
WBC # FLD AUTO: 7.63 K/UL — SIGNIFICANT CHANGE UP (ref 3.8–10.5)

## 2022-12-19 RX ORDER — WARFARIN SODIUM 2.5 MG/1
1 TABLET ORAL
Qty: 10 | Refills: 0
Start: 2022-12-19 | End: 2022-12-28

## 2022-12-19 RX ORDER — METFORMIN HYDROCHLORIDE 850 MG/1
1 TABLET ORAL
Qty: 60 | Refills: 0
Start: 2022-12-19 | End: 2023-01-17

## 2022-12-19 RX ORDER — SITAGLIPTIN AND METFORMIN HYDROCHLORIDE 500; 50 MG/1; MG/1
1 TABLET, FILM COATED ORAL
Qty: 0 | Refills: 0 | DISCHARGE

## 2022-12-19 RX ORDER — INSULIN GLARGINE 100 [IU]/ML
26 INJECTION, SOLUTION SUBCUTANEOUS
Qty: 1 | Refills: 0
Start: 2022-12-19 | End: 2023-01-17

## 2022-12-19 RX ORDER — CLOPIDOGREL BISULFATE 75 MG/1
1 TABLET, FILM COATED ORAL
Qty: 30 | Refills: 0
Start: 2022-12-19 | End: 2023-01-17

## 2022-12-19 RX ORDER — METOPROLOL TARTRATE 50 MG
1 TABLET ORAL
Qty: 30 | Refills: 0
Start: 2022-12-19 | End: 2023-01-17

## 2022-12-19 RX ORDER — ASPIRIN/CALCIUM CARB/MAGNESIUM 324 MG
1 TABLET ORAL
Qty: 30 | Refills: 0
Start: 2022-12-19 | End: 2023-01-17

## 2022-12-19 RX ORDER — LOSARTAN POTASSIUM 100 MG/1
1 TABLET, FILM COATED ORAL
Qty: 30 | Refills: 0
Start: 2022-12-19 | End: 2023-01-17

## 2022-12-19 RX ORDER — ATORVASTATIN CALCIUM 80 MG/1
1 TABLET, FILM COATED ORAL
Qty: 30 | Refills: 0
Start: 2022-12-19 | End: 2023-01-17

## 2022-12-19 RX ORDER — EMPAGLIFLOZIN 10 MG/1
1 TABLET, FILM COATED ORAL
Qty: 30 | Refills: 0
Start: 2022-12-19 | End: 2023-01-17

## 2022-12-19 RX ORDER — WARFARIN SODIUM 2.5 MG/1
5 TABLET ORAL ONCE
Refills: 0 | Status: COMPLETED | OUTPATIENT
Start: 2022-12-19 | End: 2022-12-19

## 2022-12-19 RX ORDER — INSULIN GLARGINE 100 [IU]/ML
26 INJECTION, SOLUTION SUBCUTANEOUS
Qty: 5 | Refills: 0
Start: 2022-12-19 | End: 2023-01-17

## 2022-12-19 RX ADMIN — Medication 1: at 17:40

## 2022-12-19 RX ADMIN — Medication 12 UNIT(S): at 12:23

## 2022-12-19 RX ADMIN — WARFARIN SODIUM 5 MILLIGRAM(S): 2.5 TABLET ORAL at 19:34

## 2022-12-19 RX ADMIN — Medication 12 UNIT(S): at 17:39

## 2022-12-19 RX ADMIN — LOSARTAN POTASSIUM 25 MILLIGRAM(S): 100 TABLET, FILM COATED ORAL at 06:10

## 2022-12-19 RX ADMIN — CLOPIDOGREL BISULFATE 75 MILLIGRAM(S): 75 TABLET, FILM COATED ORAL at 12:25

## 2022-12-19 RX ADMIN — Medication 12 UNIT(S): at 08:19

## 2022-12-19 RX ADMIN — Medication 50 MILLIGRAM(S): at 06:10

## 2022-12-19 RX ADMIN — CHLORHEXIDINE GLUCONATE 1 APPLICATION(S): 213 SOLUTION TOPICAL at 06:14

## 2022-12-19 RX ADMIN — Medication 81 MILLIGRAM(S): at 12:25

## 2022-12-19 NOTE — DISCHARGE NOTE NURSING/CASE MANAGEMENT/SOCIAL WORK - PATIENT PORTAL LINK FT
You can access the FollowMyHealth Patient Portal offered by [facility] by registering at the following website: [email or web address]. By joining [facility]’s FollowMyHealth portal, you will also be able to view your health information using other applications (apps) compatible with our system.

## 2022-12-19 NOTE — PROGRESS NOTE ADULT - SUBJECTIVE AND OBJECTIVE BOX
PROGRESS NOTE:     Patient is a 42y old  Male who presents with a chief complaint of chest pain (19 Dec 2022 14:44)      SUBJECTIVE / OVERNIGHT EVENTS: no acute event overnight.     ADDITIONAL REVIEW OF SYSTEMS:    MEDICATIONS  (STANDING):  aspirin  chewable 81 milliGRAM(s) Oral daily  atorvastatin 80 milliGRAM(s) Oral at bedtime  chlorhexidine 2% Cloths 1 Application(s) Topical <User Schedule>  clopidogrel Tablet 75 milliGRAM(s) Oral daily  dextrose 5%. 1000 milliLiter(s) (50 mL/Hr) IV Continuous <Continuous>  dextrose 5%. 1000 milliLiter(s) (100 mL/Hr) IV Continuous <Continuous>  dextrose 50% Injectable 25 Gram(s) IV Push once  dextrose 50% Injectable 12.5 Gram(s) IV Push once  dextrose 50% Injectable 25 Gram(s) IV Push once  glucagon  Injectable 1 milliGRAM(s) IntraMuscular once  influenza   Vaccine 0.5 milliLiter(s) IntraMuscular once  insulin glargine Injectable (LANTUS) 26 Unit(s) SubCutaneous at bedtime  insulin lispro (ADMELOG) corrective regimen sliding scale   SubCutaneous at bedtime  insulin lispro (ADMELOG) corrective regimen sliding scale   SubCutaneous three times a day before meals  insulin lispro Injectable (ADMELOG) 12 Unit(s) SubCutaneous three times a day before meals  losartan 25 milliGRAM(s) Oral daily  metoprolol succinate ER 50 milliGRAM(s) Oral daily    MEDICATIONS  (PRN):  acetaminophen     Tablet .. 650 milliGRAM(s) Oral every 6 hours PRN Mild Pain (1 - 3), Moderate Pain (4 - 6)  dextrose Oral Gel 15 Gram(s) Oral once PRN Blood Glucose LESS THAN 70 milliGRAM(s)/deciliter      CAPILLARY BLOOD GLUCOSE      POCT Blood Glucose.: 123 mg/dL (19 Dec 2022 11:44)  POCT Blood Glucose.: 121 mg/dL (19 Dec 2022 07:43)  POCT Blood Glucose.: 139 mg/dL (18 Dec 2022 21:15)  POCT Blood Glucose.: 109 mg/dL (18 Dec 2022 17:13)    I&O's Summary      PHYSICAL EXAM:  Vital Signs Last 24 Hrs  T(C): 36.9 (19 Dec 2022 10:47), Max: 36.9 (19 Dec 2022 10:47)  T(F): 98.4 (19 Dec 2022 10:47), Max: 98.4 (19 Dec 2022 10:47)  HR: 84 (19 Dec 2022 10:47) (82 - 88)  BP: 114/74 (19 Dec 2022 10:47) (112/67 - 115/78)  BP(mean): --  RR: 18 (19 Dec 2022 10:47) (17 - 18)  SpO2: 100% (19 Dec 2022 10:47) (100% - 100%)    Parameters below as of 19 Dec 2022 10:47  Patient On (Oxygen Delivery Method): room air        CONSTITUTIONAL: NAD, well-developed  RESPIRATORY: Normal respiratory effort; lungs are clear to auscultation bilaterally  CARDIOVASCULAR: Regular rate and rhythm, normal S1 and S2, no murmur/rub/gallop; No lower extremity edema; Peripheral pulses are 2+ bilaterally  ABDOMEN: Nontender to palpation, normoactive bowel sounds, no rebound/guarding; No hepatosplenomegaly  MUSCLOSKELETAL: no clubbing or cyanosis of digits; no joint swelling or tenderness to palpation  SKIN: no rash, erythema, lesion  PSYCH: A+O to person, place, and time; affect appropriate    LABS:                        14.2   7.63  )-----------( 257      ( 19 Dec 2022 06:01 )             44.1     12-19    137  |  102  |  13  ----------------------------<  178<H>  4.1   |  27  |  0.86    Ca    9.1      19 Dec 2022 06:01  Phos  3.6     12-19  Mg     1.90     12-19      PT/INR - ( 19 Dec 2022 06:01 )   PT: 25.3 sec;   INR: 2.16 ratio         PTT - ( 19 Dec 2022 06:01 )  PTT:38.8 sec            RADIOLOGY & ADDITIONAL TESTS:  Results Reviewed:   Imaging Personally Reviewed:  Electrocardiogram Personally Reviewed:    COORDINATION OF CARE:  Care Discussed with Consultants/Other Providers [Y/N]:  Prior or Outpatient Records Reviewed [Y/N]:   PROGRESS NOTE:     Patient is a 42y old  Male who presents with a chief complaint of chest pain (19 Dec 2022 14:44)      SUBJECTIVE / OVERNIGHT EVENTS: no acute event overnight. Reports feeling well. No active complaints.    ADDITIONAL REVIEW OF SYSTEMS:    MEDICATIONS  (STANDING):  aspirin  chewable 81 milliGRAM(s) Oral daily  atorvastatin 80 milliGRAM(s) Oral at bedtime  chlorhexidine 2% Cloths 1 Application(s) Topical <User Schedule>  clopidogrel Tablet 75 milliGRAM(s) Oral daily  dextrose 5%. 1000 milliLiter(s) (50 mL/Hr) IV Continuous <Continuous>  dextrose 5%. 1000 milliLiter(s) (100 mL/Hr) IV Continuous <Continuous>  dextrose 50% Injectable 25 Gram(s) IV Push once  dextrose 50% Injectable 12.5 Gram(s) IV Push once  dextrose 50% Injectable 25 Gram(s) IV Push once  glucagon  Injectable 1 milliGRAM(s) IntraMuscular once  influenza   Vaccine 0.5 milliLiter(s) IntraMuscular once  insulin glargine Injectable (LANTUS) 26 Unit(s) SubCutaneous at bedtime  insulin lispro (ADMELOG) corrective regimen sliding scale   SubCutaneous at bedtime  insulin lispro (ADMELOG) corrective regimen sliding scale   SubCutaneous three times a day before meals  insulin lispro Injectable (ADMELOG) 12 Unit(s) SubCutaneous three times a day before meals  losartan 25 milliGRAM(s) Oral daily  metoprolol succinate ER 50 milliGRAM(s) Oral daily    MEDICATIONS  (PRN):  acetaminophen     Tablet .. 650 milliGRAM(s) Oral every 6 hours PRN Mild Pain (1 - 3), Moderate Pain (4 - 6)  dextrose Oral Gel 15 Gram(s) Oral once PRN Blood Glucose LESS THAN 70 milliGRAM(s)/deciliter      CAPILLARY BLOOD GLUCOSE      POCT Blood Glucose.: 123 mg/dL (19 Dec 2022 11:44)  POCT Blood Glucose.: 121 mg/dL (19 Dec 2022 07:43)  POCT Blood Glucose.: 139 mg/dL (18 Dec 2022 21:15)  POCT Blood Glucose.: 109 mg/dL (18 Dec 2022 17:13)    I&O's Summary      PHYSICAL EXAM:  Vital Signs Last 24 Hrs  T(C): 36.9 (19 Dec 2022 10:47), Max: 36.9 (19 Dec 2022 10:47)  T(F): 98.4 (19 Dec 2022 10:47), Max: 98.4 (19 Dec 2022 10:47)  HR: 84 (19 Dec 2022 10:47) (82 - 88)  BP: 114/74 (19 Dec 2022 10:47) (112/67 - 115/78)  BP(mean): --  RR: 18 (19 Dec 2022 10:47) (17 - 18)  SpO2: 100% (19 Dec 2022 10:47) (100% - 100%)    Parameters below as of 19 Dec 2022 10:47  Patient On (Oxygen Delivery Method): room air        CONSTITUTIONAL: NAD, well-developed  RESPIRATORY: Normal respiratory effort; lungs are clear to auscultation bilaterally  CARDIOVASCULAR: Regular rate and rhythm, normal S1 and S2, no murmur/rub/gallop; No lower extremity edema  ABDOMEN: Nontender to palpation, normoactive bowel sounds, no rebound/guarding  MUSCLOSKELETAL: no clubbing or cyanosis of digits; no joint swelling or tenderness to palpation  SKIN: no rash, erythema, lesion  PSYCH: A+O to person, place, and time; affect appropriate    LABS:                        14.2   7.63  )-----------( 257      ( 19 Dec 2022 06:01 )             44.1     12-19    137  |  102  |  13  ----------------------------<  178<H>  4.1   |  27  |  0.86    Ca    9.1      19 Dec 2022 06:01  Phos  3.6     12-19  Mg     1.90     12-19      PT/INR - ( 19 Dec 2022 06:01 )   PT: 25.3 sec;   INR: 2.16 ratio         PTT - ( 19 Dec 2022 06:01 )  PTT:38.8 sec            RADIOLOGY & ADDITIONAL TESTS:  Results Reviewed:   Imaging Personally Reviewed:  Electrocardiogram Personally Reviewed:    COORDINATION OF CARE:  Care Discussed with Consultants/Other Providers [Y/N]:  Prior or Outpatient Records Reviewed [Y/N]:

## 2022-12-19 NOTE — PROGRESS NOTE ADULT - ASSESSMENT
42 y/p [place] male with h/o smoking and pre DM2 presents with chest pain - + AW STEMI and now s/p JESUS to pLAD and POBA to diag, course complicated by post-STEMI pericarditis and LV thrombus. Now pending therapeutic INR on coumadin.  42 y/p [place] male with h/o smoking and pre DM2 presents with chest pain - + AW STEMI and now s/p JESUS to pLAD and POBA to diag, course complicated by post-STEMI pericarditis and LV thrombus. Now therapeutic INR on coumadin.

## 2022-12-19 NOTE — PROGRESS NOTE ADULT - PROBLEM SELECTOR PLAN 4
Continue metoprolol, losartan  - trend BPs
A1c 10.1  - endocrine consulted, appreciate recommendations  - continue basal bolus regimen   - trend FS, ISS  - outpatient regimen: Basal insulin per insurance, START Metformin 500 mg PO BID, increase to 1000 mg PO BID after 1 week, START SGLT2i: Jardiance 10 mg PO daily OR Farxiga 5 mg PO daily (depending on insurance), STOP Janumet (per patient he was not recently taking)
A1c 10.1  - endocrine consulted, appreciate recommendations  - continue lantus 18U qHS, admelog 6U premeal  - trend FS, ISS  - outpatient regimen: Basal insulin per insurance, START Metformin 500 mg PO BID, increase to 1000 mg PO BID after 1 week, START SGLT2i: Jardiance 10 mg PO daily OR Farxiga 5 mg PO daily (depending on insurance), STOP Janumet (per patient he was not recently taking)
A1c 10.1  - endocrine consulted, appreciate recommendations  - continue basal bolus regimen   - trend FS, ISS  - outpatient regimen: Basal insulin per insurance, START Metformin 500 mg PO BID, increase to 1000 mg PO BID after 1 week, START SGLT2i: Jardiance 10 mg PO daily OR Farxiga 5 mg PO daily (depending on insurance), STOP Janumet (per patient he was not recently taking)
Continue metoprolol, losartan  - trend BPs
A1c 10.1  - endocrine consulted, appreciate recommendations  - continue basal bolus regimen   - trend FS, ISS  - outpatient regimen: Basal insulin per insurance, START Metformin 500 mg PO BID, increase to 1000 mg PO BID after 1 week, START SGLT2i: Jardiance 10 mg PO daily OR Farxiga 5 mg PO daily (depending on insurance), STOP Janumet (per patient he was not recently taking)
A1c 10.1  - endocrine consulted, appreciate recommendations  - continue lantus 18U qHS, admelog 6U premeal  - trend FS, ISS  - outpatient regimen: Basal insulin per insurance, START Metformin 500 mg PO BID, increase to 1000 mg PO BID after 1 week, START SGLT2i: Jardiance 10 mg PO daily OR Farxiga 5 mg PO daily (depending on insurance), STOP Janumet (per patient he was not recently taking)
A1c 10.1  - endocrine consulted, appreciate recommendations  - continue lantus 18U qHS, admelog 6U premeal  - trend FS, ISS  - outpatient regimen: Basal insulin per insurance, START Metformin 500 mg PO BID, increase to 1000 mg PO BID after 1 week, START SGLT2i: Jardiance 10 mg PO daily OR Farxiga 5 mg PO daily (depending on insurance), STOP Janumet (per patient he was not recently taking)

## 2022-12-19 NOTE — PROGRESS NOTE ADULT - PROBLEM SELECTOR PROBLEM 6
Hyperlipidemia
Need for prophylactic measure
Hyperlipidemia
Need for prophylactic measure
Hyperlipidemia

## 2022-12-19 NOTE — PROGRESS NOTE ADULT - PROBLEM SELECTOR PROBLEM 5
Hypertension
Hyperlipidemia
Hyperlipidemia
Hypertension

## 2022-12-19 NOTE — PROGRESS NOTE ADULT - PROBLEM SELECTOR PROBLEM 3
Hyperlipidemia
DM2 (diabetes mellitus, type 2)
SIRS (systemic inflammatory response syndrome)
DM2 (diabetes mellitus, type 2)
SIRS (systemic inflammatory response syndrome)
SIRS (systemic inflammatory response syndrome)

## 2022-12-19 NOTE — PROGRESS NOTE ADULT - PROBLEM SELECTOR PLAN 7
- DVT ppx: coumadin   - Diet: regular  - Dispo: tomorrow - DVT ppx: coumadin   - Diet: regular  - Dispo: medically stable to discharge today. Outpt f/u with endo and card. Discharge planning 36 mins.

## 2022-12-19 NOTE — PROGRESS NOTE ADULT - PROBLEM SELECTOR PLAN 6
**See Addendum**
History of Present Illness
 
General
Chief Complaint: Psychiatric Related Complaint
Stated Complaint: BIBA WITH A POSIVTIVE SI
Source: patient
Exam Limitations: no limitations
 
Vital Signs & Intake/Output
Vital Signs & Intake/Output
 Vital Signs
 
 
Date Time Temp Pulse Resp B/P B/P Pulse O2 O2 Flow FiO2
 
     Mean Ox Delivery Rate 
 
02/05 2218 98.3 57 16 127/78  97   
 
02/05 2200 98.3 57 16 127/78     
 
02/05 1958  55 20 132/80  95 Room Air  
 
02/05 1606 98.2 57 20 127/71  96 Room Air  
 
02/05 0929 97.6 80 18 143/92  98 Room Air  
 
02/05 0547 97.0 92 18 109/54  97 Room Air  
 
02/05 0226 99.3 44 18 92/52  94 Room Air  
 
 
 
Allergies
Coded Allergies:
NO KNOWN ALLERGIES (03/02/17)
 
Triage Note:
SEE NURSES NOTES
Triage Nurses Notes Reviewed? yes
HPI:
Patient presents for evaluation of suicide ideation.  He states he just got out 
of snf and he states he would have committed suicide in sn[place] if possible.  He 
states he is still suicidal and was found on his way to the train tracks.  
Patient admits to heavy alcohol use today but denies any associated drugs.  He 
states that there has been a lot of "crazy stuff" going on.  When notified of 
the abrasion of the nose patient states that he fell on his way to the train 
tracks.  He denies loss of consciousness or other injury.
(Francesca CHAPMAN,Faisal LUCERO)
Reconcile Medications
No Known Home Medications
 
(Darion CHAPMAN,Rachana)
 
Past History
 
Travel History
Traveled to [place] past 21 day No
 
Medical History
Any Pertinent Medical History? see below for history
Neurological: NONE
EENT: NONE
Cardiovascular: NONE
Respiratory: NONE
Gastrointestinal: pancreatitis
Hepatic: hepatitis C, ELEVATED LIVER ENZYMES
Renal: NONE
Musculoskeletal: NONE
Psychiatric: anxiety, depression, insomnia, STRESS
Endocrine: NONE
Blood Disorders: NONE
Cancer(s): NONE
GYN/Reproductive: NONE
History of MRSA: No
History of VRE: No
History of CDIFF: No
Isolation History: Standard
Tetanus Vaccine: 08/05/17
 
Surgical History
Surgical History: non-contributory
 
Psychosocial History
Who do you live with Other (see notes)
Services at Home None
What is your primary language English
Tobacco Use: Current Daily Use
Daily Tobacco Use Amount/Type: => 5 Cigarettes daily
ETOH Use: occasional use
Illicit Drug Use: denies illicit drug use
 
Family History
Family History, If Any:
MOTHER
  FH: diabetes mellitus
  FHx: stroke
FATHER
  FHx: stroke
BROTHER
  FH: heart attack
 
Hx Contributory? No
(Francesca CHAPMAN,Faisal LUCERO)
 
Review of Systems
 
Review of Systems
Constitutional:
Reports: no symptoms. 
EENTM:
Reports: no symptoms. 
Respiratory:
Reports: no symptoms. 
Cardiovascular:
Reports: no symptoms. 
GI:
Reports: no symptoms. 
Genitourinary:
Reports: no symptoms. 
Musculoskeletal:
Reports: no symptoms. 
Skin:
Reports: no symptoms. 
Neurological/Psychological:
Reports: see HPI. 
Hematologic/Endocrine:
Reports: no symptoms. 
Immunologic/Allergic:
Reports: no symptoms. 
All Other Systems: Reviewed and Negative
(Francesca CHAPMAN,Faisal LUCERO)
 
Physical Exam
 
Physical Exam
General Appearance: SEE BELOW
Neurological/Psychiatric: SEE BELOW
Comments:
General: Alert, calm, cooperative, EtOH-like odor
Head: Normocephalic, atraumatic
Eyes: Normal inspection, no nystagmus, EOMI, no periorbital ecchymoses
Ears: Normal inspection, no reyes sign
Nose: Mild abrasion of the nasal bridge, no septal hematoma
Throat: Moist mucosa
Neck: Supple, no goiter
Heart: Regular rate and rhythm, no murmurs rubs or gallops
Lungs: Clear to auscultation bilaterally with good air entry
Abdomen: Soft nontender nondistended, normal bowel sounds
Chest: Nontender
Extremities: Normal range of motion grossly, no tremors present, no cyanosis 
clubbing or edema of the upper extremities
Neurologic: cranial nerves II through XII grossly intact, speech clear, gait 
normal
Psychiatric: No apparent delusions or hallucinations, no pressured speech or 
thought blocking
(Francesca CHAPMAN,Faisal LUCERO)
SAD PERSONS
 
 
SAD PERSONS Response Value
 
Male Sex? yes 1
 
Age <19 or >45 years? yes 1
 
Depression/Hopelessness? yes 2
 
Previous Attempts/Psych Care yes 1
 
Excessive Ethanol/Drug Use? yes 1
 
Rational Thinking Loss? yes 2
 
Single//? yes 1
 
Social Support? has support 0
 
Stated Future Intent? yes 2
 
Total   11
 
 
SAD PERSONS Done? yes
(Ambar CHAPMAN,Binu)
 
Progress
Plan of Care:
 Orders
 
 
Procedure Date/time Status
 
Continuous Observation Monitor 02/05 0700 Active
 
 
 Current Medications
 
 
  Sig/Leyda Start time  Last
 
Medication Dose  Stop Time Status Admin
 
Trazodone HCl 50 MG AT BEDTIME 02/04 2200 UNVr 02/05
 
(Desyrel)     2123
 
Hydroxyzine HCl 50 MG Q6P PRN 02/04 1945 AC 02/05
 
(Atarax)     2123
 
Lorazepam 1 MG Q6P PRN 02/04 1945 AC 
 
(Ativan)     
 
 
 
7:12 PM 2/3/18
PATIENT SIGNED OUT TO ME BY DR SANFORD.  PENDING CRISIS DISPOSITION.
 
7:10 AM 2/5/18
PATIENT SIGNED OUT TO ME BY DR LEE.  PENDING CRISIS EVALUATION/
DISPOSITION.
(Rachana Orlando MD)
(Faisal Ma MD)
Hand-Off
   Endorsed To:
Christopher Cordero MD
   Endorsed Time: 1900
   Pending: consult (CRISIS INPATIENT BED SEARCH), other (CRISIS BED)
(Rachana Orlando MD)
Differential Diagnosis: drug intoxication, drug overdose, drug withdrawal, 
electrolyte abnormality, hypoglycemia
Hand-Off
   Endorsed To:
Arsenio Lee MD
   Endorsed Time: 1900
   Pending: other (psychiatric disposition)
(Binu Sanford MD)
Hand-Off
   Endorsed To:
Binu Sanford MD
   Endorsed Time: 0700
   Pending: consult
(Christopher Cordero MD)
 
Departure
 
Departure
Condition: Stable
Referrals:
Art Barnes (PCP/Family)
 
Departure Forms:
Customer Survey
General Discharge Information
(Faisal Ma MD)
 
Departure
Prescriptions:
Current Visit Scripts
No Known Home Medications     
 
(Rachana Orlando MD)
 
Departure
Disposition: STILL A PATIENT
Clinical Impression
Primary Impression: Depression with suicidal ideation
Secondary Impressions: Alcohol intoxication
(Binu Sanford MD)
 
PA/NP Co-Sign Statement
Statement:
ED Attending supervision documentation-
 
[] I saw and evaluated the patient. I have also reviewed all the pertinent lab 
results and diagnostic results. I agree with the findings and the plan of care 
as documented in the PA's/NP's documentation. 
 
[x] I have reviewed the ED Record and agree with the PA's/NP's documentation.
 
[] Additions or exceptions (if any) to the PAs/NP's note and plan are 
summarized below:
[]
 
(Arsenio Lee MD)
- DVT ppx: HSQ  - Diet: regular  - Dispo: home pending medical workup
- continue atorvastatin
- DVT ppx: HSQ  - Diet: regular  - Dispo: home pending medical workup

## 2022-12-19 NOTE — PROGRESS NOTE ADULT - PROBLEM SELECTOR PROBLEM 4
Hypertension
DM2 (diabetes mellitus, type 2)
DM2 (diabetes mellitus, type 2)
Hypertension
DM2 (diabetes mellitus, type 2)

## 2022-12-19 NOTE — PROGRESS NOTE ADULT - PROBLEM SELECTOR PLAN 3
Resolved. Pt with fever and pleuritic chest pain/throat pain, concerning for post-MI pericarditis  - blood cultures with no growth to date  - CXR without focal finding  - pleuritic chest pain, on ASA and added colchicine x 7 days for treatment of pericarditis (end 12/19)
Pt with fever and pleuritic chest pain/throat pain, concerning for post-MI pericarditis  - blood cultures with no growth to date  - treatment of pericarditis as above  - CXR without focal finding
Resolved. Pt with fever and pleuritic chest pain/throat pain, concerning for post-MI pericarditis  - blood cultures with no growth to date  - treatment of pericarditis as above  - CXR without focal finding
A1c 10.1  - endocrine consulted, appreciate recommendations  - start lantus 18U qHS, admelog 6U premeal  - trend FS, ISS
Resolved. Pt with fever and pleuritic chest pain/throat pain, concerning for post-MI pericarditis  - blood cultures with no growth to date  - treatment of pericarditis as above  - CXR without focal finding
A1c 10.1  - endocrine consulted, appreciate recommendations  - continue lantus 18U qHS, admelog 6U premeal  - trend FS, ISS  - outpatient regimen: Basal insulin per insurance, START Metformin 500 mg PO BID, increase to 1000 mg PO BID after 1 week, START SGLT2i: Jardiance 10 mg PO daily OR Farxiga 5 mg PO daily (depending on insurance), STOP Janumet (per patient he was not recently taking)
Resolved. Pt with fever and pleuritic chest pain/throat pain, concerning for post-MI pericarditis  - blood cultures with no growth to date  - CXR without focal finding  - pleuritic chest pain, on ASA and added colchicine x 7 days for treatment of pericarditis (end 12/19)
Resolved. Pt with fever and pleuritic chest pain/throat pain, concerning for post-MI pericarditis  - blood cultures with no growth to date  - treatment of pericarditis as above  - CXR without focal finding

## 2022-12-19 NOTE — PROGRESS NOTE ADULT - PROVIDER SPECIALTY LIST ADULT
Cardiology
CCU
Hospitalist
Hospitalist
Cardiology
Endocrinology
Hospitalist
Endocrinology
Hospitalist
Endocrinology
Hospitalist

## 2022-12-19 NOTE — PROGRESS NOTE ADULT - ASSESSMENT
This is a 43 yo M /w a PMH of DM2 who present with chest pain and found to have a STEMI with 100% occlusion of proximal LAD c/b reduced ef /w EF 30% now s/p PCI and stenting. Endocrinology for DM2 management given persistent hyperglycemia.     1. DM 2 with hyperglycemia  Uncontrolled, A1c 10.1%  Home Regimen: None, was taking Janumet at some point in the past    While inpatient:  BG target 100-180 mg/dl   Continue with Lantus 26 units SQ qHS   Continue Admelog 12 units SQ TID before meals (Hold if NPO/skips meal)   Continue Admelog LOW dose scale before meals, continue low dose at bedtime   Check BG before meals and bedtime  Consistent carbohydrate diet, RD consult done   Hypoglycemia protocol   DM Education: Insulin PEN education complete    Discharge Plan:   Recommend basal + oral regimen for discharge. Patient is agreeable. Reviewed lifestyle and dietary modifications. Given current insulin requirements, concern he may require bolus insulin as outpatient - followup with PCP/endocrinology for further titration and monitoring.  Semglee PEN preferred by insurance therefore recommend: 26 units SQ qHS  START Metformin 500 mg PO BID, increase to 1000 mg PO BID after 1 week (note, although he has CHF with reduced EF, he is currently stable with acceptable GFR and lactate levels. Ok to use Metformin)   START SGLT2i: Jardiance 10 mg PO daily (covered by insurance for $15 per primary team) this will provide further glycemic control and also reduce complications and hospitalizations from CHF   STOP Janumet (per patient he was not recently taking)  Can consider GLP1RA as outpatient   In regards to SGLT2i, patient reports +hx of kidney stone, no hx of UTI or genital mycotic infections  Please prescribe a new glucometer, glucose test strips, lancets, alcohol swabs and insulin PEN needles    Followup with Endocrine Practice at [address] [place] [address], [address], [place] [address];  # [phone]  *Emailed office to facilitate scheduling, they will contact patient on his cell phone ([phone])    2. HTN  BP target less than 130/80  On Losartan and Metoprolol  Within target, continue to monitor     3. HLD  LDL target less than 70  , s/p STEMI  Continue Atorvastatin 80mg daily if no contraindications    Riya Walker  Nurse Practitioner  Division of Endocrinology & Diabetes  In house pager #[phone]/long range pager #[phone]    If before 9AM or after 6PM, or on weekends/holidays, please call endocrine answering service for assistance ([phone]).  For nonurgent matters email [email or web address] for assistance.  This is a 41 yo M /w a PMH of DM2 who present with chest pain and found to have a STEMI with 100% occlusion of proximal LAD c/b reduced ef /w EF 30% now s/p PCI and stenting. Endocrinology for DM2 management given persistent hyperglycemia.     1. DM 2 with hyperglycemia  Uncontrolled, A1c 10.1%  Home Regimen: None, was taking Janumet at some point in the past    While inpatient:  BG target 100-180 mg/dl   Continue with Lantus 26 units SQ qHS   Continue Admelog 12 units SQ TID before meals (Hold if NPO/skips meal)   Continue Admelog LOW dose scale before meals, continue low dose at bedtime   Check BG before meals and bedtime  Consistent carbohydrate diet, RD consult done   Hypoglycemia protocol   DM Education: Insulin PEN education complete    Discharge Plan:   Recommend basal + oral regimen for discharge. Patient is agreeable. Reviewed lifestyle and dietary modifications. Given current insulin requirements, concern he may require bolus insulin as outpatient - followup with PCP/endocrinology for further titration and monitoring.  Semglee PEN preferred by insurance therefore recommend: 26 units SQ qHS  START Metformin 500 mg PO BID, increase to 1000 mg PO BID after 1 week (note, although he has CHF with reduced EF, he is currently stable with acceptable GFR and lactate levels. Ok to use Metformin)   START SGLT2i: Jardiance 10 mg PO daily (covered by insurance for $15 per primary team) this will provide further glycemic control and also reduce complications and hospitalizations from CHF   STOP Janumet (per patient he was not recently taking)  Can consider GLP1RA as outpatient   In regards to SGLT2i, patient reports +hx of kidney stone, no hx of UTI or genital mycotic infections  Please prescribe a new glucometer, glucose test strips, lancets, alcohol swabs and insulin PEN needles    Followup with Endocrine Practice at [address] [place] [address], [address], [place] [address];  # [phone]  *Emailed office to facilitate scheduling, they will contact patient on his cell phone ([phone])    2. HTN  BP target less than 130/80  On Losartan and Metoprolol  Within target, continue to monitor     3. HLD  LDL target less than 70  , s/p STEMI  Continue Atorvastatin 80mg daily if no contraindications    Reviewed DC recommendations with primary team (TAWANNA, Nadeem CERNA)  Riya Walker  Nurse Practitioner  Division of Endocrinology & Diabetes  In house pager #[phone]/long range pager #[phone]    If before 9AM or after 6PM, or on weekends/holidays, please call endocrine answering service for assistance ([phone]).  For nonurgent matters email [email or web address] for assistance.

## 2022-12-19 NOTE — DISCHARGE NOTE NURSING/CASE MANAGEMENT/SOCIAL WORK - NSDCPEWEB_GEN_ALL_CORE
[facility] for Tobacco Control website --- [email or web address] website --- [email or web address]

## 2022-12-19 NOTE — PROGRESS NOTE ADULT - PROBLEM SELECTOR PROBLEM 1
DM2 (diabetes mellitus, type 2)
Left ventricular thrombus
STEMI (ST elevation myocardial infarction)
Left ventricular thrombus
STEMI (ST elevation myocardial infarction)
Left ventricular thrombus

## 2022-12-19 NOTE — DISCHARGE NOTE NURSING/CASE MANAGEMENT/SOCIAL WORK - NSDCPEFALRISK_GEN_ALL_CORE
For information on Fall & Injury Prevention, visit: [email or web address] OR  [email or web address] OR  https://www.cdc.gov/steadi/patient.html

## 2022-12-19 NOTE — PROGRESS NOTE ADULT - PROBLEM SELECTOR PROBLEM 2
Hypertension
Hypertension
STEMI (ST elevation myocardial infarction)
Hypertension
STEMI (ST elevation myocardial infarction)
SIRS (systemic inflammatory response syndrome)
SIRS (systemic inflammatory response syndrome)
STEMI (ST elevation myocardial infarction)
STEMI (ST elevation myocardial infarction)

## 2022-12-19 NOTE — PROGRESS NOTE ADULT - PROBLEM SELECTOR PLAN 2
s/p JESUS to pLAD  - continue DAPT, atorvastatin  - continue toprol 50 daily   - TTE EF 30%, severe LV systolic dysfunction, normal RV   - cardiac enzymes downtrending  - pleuritic chest pain, on ASA and colchicine x 7 days for treatment of pericarditis  - continue Losartan 25mg daily
s/p JESUS to pLAD  - continue DAPT, atorvastatin  - continue toprol 50 daily   - continue Losartan 25mg daily  - TTE EF 30%, severe LV systolic dysfunction, normal RV   - cardiac enzymes downtrending
Pt with fever and pleuritic chest pain/throat pain, concerning for post-MI pericarditis  - follow up blood and urine cultures  - treatment of pericarditis as above  - CXR without focal finding
s/p JESUS to pLAD  - continue DAPT, atorvastatin  - continue metoprolol 25mg BID  - TTE EF 30%, severe LV systolic dysfunction, normal RV   - cardiac enzymes downtrending  - pleuritic chest pain, on ASA and colchicine x 7 days for treatment of pericarditis  - continue Losartan 25mg daily
s/p JESUS to pLAD  - continue DAPT, atorvastatin  - continue metoprolol 25mg BID  - TTE EF 30%, severe LV systolic dysfunction, normal RV   - cardiac enzymes downtrending  - pleuritic chest pain, on ASA and colchicine x 7 days for treatment of pericarditis  - continue Losartan 12.5mg PO daily
Pt with fever and pleuritic chest pain/throat pain, concerning for post-MI pericarditis  - blood cultures with no growth to date  - treatment of pericarditis as above  - CXR without focal finding
s/p JESUS to pLAD  - continue DAPT, atorvastatin  - continue metoprolol 25mg BID  - TTE EF 30%, severe LV systolic dysfunction, normal RV   - cardiac enzymes downtrending  - pleuritic chest pain, on high dose ASA and colchicine x 7 days for treatment of pericarditis  - continue Losartan 12.5mg PO daily  - repeat echo to r/o LV thrombus
s/p JESUS to pLAD  - continue DAPT, atorvastatin  - continue toprol 50 daily   - continue Losartan 25mg daily  - TTE EF 30%, severe LV systolic dysfunction, normal RV   - cardiac enzymes downtrending

## 2022-12-19 NOTE — PROGRESS NOTE ADULT - PROBLEM SELECTOR PLAN 5
Continue metoprolol, losartan  - trend BPs
- continue atorvastatin
Continue metoprolol, losartan  - trend BPs
- continue atorvastatin
Continue metoprolol, losartan  - trend BPs

## 2022-12-19 NOTE — PROGRESS NOTE ADULT - PROBLEM SELECTOR PLAN 1
Repeat echo demonstrating LV thrombus  - pt on heparin gtt with bridge to coumadin, DOAC not indicated   - check INR daily, goal 2-3  - cards recs appreciated Repeat echo demonstrating LV thrombus  - pt on heparin gtt with bridge to coumadin, DOAC not indicated   - check INR daily, goal 2-3  - cards recs appreciated  - therapeutic on coumadine. Discussed with pharmacy who recommended discharging on coumadin 5mg daily and outpt INR f/u

## 2022-12-23 ENCOUNTER — APPOINTMENT (OUTPATIENT)
Dept: CARDIOLOGY | Facility: CLINIC | Age: 42
End: 2022-12-23
Payer: COMMERCIAL

## 2022-12-23 ENCOUNTER — NON-APPOINTMENT (OUTPATIENT)
Age: 42
End: 2022-12-23

## 2022-12-23 VITALS
DIASTOLIC BLOOD PRESSURE: 52 MMHG | SYSTOLIC BLOOD PRESSURE: 101 MMHG | TEMPERATURE: 97.4 F | HEIGHT: 70 IN | HEART RATE: 87 BPM | OXYGEN SATURATION: 95 % | WEIGHT: 180 LBS | BODY MASS INDEX: 25.77 KG/M2

## 2022-12-23 DIAGNOSIS — F17.200 NICOTINE DEPENDENCE, UNSPECIFIED, UNCOMPLICATED: ICD-10-CM

## 2022-12-23 PROCEDURE — 93000 ELECTROCARDIOGRAM COMPLETE: CPT

## 2022-12-23 PROCEDURE — 36415 COLL VENOUS BLD VENIPUNCTURE: CPT

## 2022-12-23 PROCEDURE — 99215 OFFICE O/P EST HI 40 MIN: CPT

## 2022-12-23 NOTE — REASON FOR VISIT
[Cardiac Failure] : cardiac failure [Structural Heart and Valve Disease] : structural heart and valve disease [Hyperlipidemia] : hyperlipidemia [Hypertension] : hypertension [Coronary Artery Disease] : coronary artery disease

## 2022-12-23 NOTE — HISTORY OF PRESENT ILLNESS
[FreeTextEntry1] : 42 year old man with admission to [facility] from 12/11-12/19/22\par Had AWMI with PCI to pLAD and POBA to diag\par TTE thereafter showed HFrEF with EF 30% and LV thrombus\par \par #CAD- AWMI with PCI to pLAD and POBA to diag\par On ASA and Plavix\par Will continue ASA until next visit and then continue on Plavix alone since also on coumadin\par \par #HFrEF- Patient currently on Toprol and Losartan\par BP is boderline so will hold off on starting aldactone/Entresto at this time\par Check CMP and will followup in 2 weeks\par \par #LV thrombus- On coumadin , check INR today and recheck next week\par \par #HLD- continue Lipitor 80 mg daily\par

## 2022-12-23 NOTE — DISCUSSION/SUMMARY
[FreeTextEntry1] : 42 year old man with history of DM HTN HLD with recent AWMI and PCI to LAD and POBA to Daig\par Here after dc from [facility] for establishing care\par \par #CAD- AWMI with PCI to pLAD and POBA to diag\par On ASA and Plavix\par Will continue ASA until next visit and then continue on Plavix alone since also on coumadin\par \par #HFrEF- Patient currently on Toprol and Losartan\par BP is boderline so will hold off on starting aldactone/Entresto at this time\par Check CMP and will followup in 2 weeks\par \par #LV thrombus- On coumadin , check INR today and recheck next week\par \par #HLD- continue Lipitor 80 mg daily [EKG obtained to assist in diagnosis and management of assessed problem(s)] : EKG obtained to assist in diagnosis and management of assessed problem(s)

## 2022-12-27 ENCOUNTER — APPOINTMENT (OUTPATIENT)
Dept: CARDIOLOGY | Facility: CLINIC | Age: 42
End: 2022-12-27

## 2022-12-27 ENCOUNTER — NON-APPOINTMENT (OUTPATIENT)
Age: 42
End: 2022-12-27

## 2022-12-27 LAB
ALBUMIN SERPL ELPH-MCNC: 4.6 G/DL
ALP BLD-CCNC: 53 U/L
ALT SERPL-CCNC: 55 U/L
ANION GAP SERPL CALC-SCNC: 12 MMOL/L
AST SERPL-CCNC: 19 U/L
BILIRUB SERPL-MCNC: 1.1 MG/DL
BUN SERPL-MCNC: 17 MG/DL
CALCIUM SERPL-MCNC: 10.2 MG/DL
CHLORIDE SERPL-SCNC: 99 MMOL/L
CO2 SERPL-SCNC: 26 MMOL/L
CREAT SERPL-MCNC: 0.88 MG/DL
EGFR: 111 ML/MIN/1.73M2
GLUCOSE SERPL-MCNC: 93 MG/DL
INR PPP: 1.22 RATIO
POTASSIUM SERPL-SCNC: 5.6 MMOL/L
PROT SERPL-MCNC: 7.2 G/DL
PT BLD: 14.2 SEC
SODIUM SERPL-SCNC: 138 MMOL/L

## 2022-12-27 PROCEDURE — 93793 ANTICOAG MGMT PT WARFARIN: CPT

## 2022-12-30 ENCOUNTER — NON-APPOINTMENT (OUTPATIENT)
Age: 42
End: 2022-12-30

## 2022-12-30 ENCOUNTER — APPOINTMENT (OUTPATIENT)
Dept: CARDIOLOGY | Facility: CLINIC | Age: 42
End: 2022-12-30
Payer: COMMERCIAL

## 2022-12-30 LAB
ALBUMIN SERPL ELPH-MCNC: 4.7 G/DL
ALP BLD-CCNC: 62 U/L
ALT SERPL-CCNC: 35 U/L
ANION GAP SERPL CALC-SCNC: 13 MMOL/L
AST SERPL-CCNC: 22 U/L
BILIRUB SERPL-MCNC: 0.6 MG/DL
BUN SERPL-MCNC: 20 MG/DL
CALCIUM SERPL-MCNC: 10.1 MG/DL
CHLORIDE SERPL-SCNC: 102 MMOL/L
CO2 SERPL-SCNC: 27 MMOL/L
CREAT SERPL-MCNC: 0.95 MG/DL
EGFR: 103 ML/MIN/1.73M2
GLUCOSE SERPL-MCNC: 201 MG/DL
INR PPP: 2.07 RATIO
POTASSIUM SERPL-SCNC: 4.8 MMOL/L
PROT SERPL-MCNC: 7.2 G/DL
PT BLD: 24.4 SEC
SODIUM SERPL-SCNC: 142 MMOL/L

## 2022-12-30 PROCEDURE — 93793 ANTICOAG MGMT PT WARFARIN: CPT

## 2023-01-03 ENCOUNTER — LABORATORY RESULT (OUTPATIENT)
Age: 43
End: 2023-01-03

## 2023-01-03 ENCOUNTER — NON-APPOINTMENT (OUTPATIENT)
Age: 43
End: 2023-01-03

## 2023-01-03 ENCOUNTER — APPOINTMENT (OUTPATIENT)
Dept: CARDIOLOGY | Facility: CLINIC | Age: 43
End: 2023-01-03
Payer: COMMERCIAL

## 2023-01-03 VITALS
HEART RATE: 79 BPM | OXYGEN SATURATION: 97 % | TEMPERATURE: 98.5 F | DIASTOLIC BLOOD PRESSURE: 71 MMHG | BODY MASS INDEX: 24.82 KG/M2 | HEIGHT: 70 IN | SYSTOLIC BLOOD PRESSURE: 107 MMHG

## 2023-01-03 VITALS — BODY MASS INDEX: 24.82 KG/M2 | WEIGHT: 173 LBS

## 2023-01-03 LAB
ALBUMIN SERPL ELPH-MCNC: 4.7 G/DL
ALP BLD-CCNC: 49 U/L
ALT SERPL-CCNC: 29 U/L
ANION GAP SERPL CALC-SCNC: 13 MMOL/L
AST SERPL-CCNC: 20 U/L
BILIRUB SERPL-MCNC: 0.5 MG/DL
BUN SERPL-MCNC: 17 MG/DL
CALCIUM SERPL-MCNC: 9.8 MG/DL
CHLORIDE SERPL-SCNC: 102 MMOL/L
CO2 SERPL-SCNC: 24 MMOL/L
CREAT SERPL-MCNC: 0.91 MG/DL
EGFR: 109 ML/MIN/1.73M2
GLUCOSE SERPL-MCNC: 113 MG/DL
POTASSIUM SERPL-SCNC: 4.3 MMOL/L
PROT SERPL-MCNC: 7 G/DL
SODIUM SERPL-SCNC: 140 MMOL/L

## 2023-01-03 PROCEDURE — 93000 ELECTROCARDIOGRAM COMPLETE: CPT

## 2023-01-03 PROCEDURE — 36415 COLL VENOUS BLD VENIPUNCTURE: CPT

## 2023-01-03 PROCEDURE — 99215 OFFICE O/P EST HI 40 MIN: CPT

## 2023-01-03 RX ORDER — LOSARTAN POTASSIUM 25 MG/1
25 TABLET, FILM COATED ORAL DAILY
Refills: 0 | Status: DISCONTINUED | COMMUNITY
End: 2023-01-03

## 2023-01-03 NOTE — DISCUSSION/SUMMARY
[EKG obtained to assist in diagnosis and management of assessed problem(s)] : EKG obtained to assist in diagnosis and management of assessed problem(s) [FreeTextEntry1] : 42 year old man with history of DM HTN HLD with recent AWMI and PCI to LAD and POBA to Daig\par Here after dc from [facility] for establishing care\par \par #CAD- AWMI with PCI to pLAD and POBA to diag\par On ASA and Plavix ( D/c Asa after 1/11/23) \par Will advise to discontinue ASA 1/11/23 and continue on Plavix alone since also on coumadin\par \par #HFrEF- Patient currently on Toprol\par BP is boderline so will hold off on starting aldactone/Entresto at this time\par Will consider restarting Losartan prior to adding Entresto and aldactone\par Check CMP and PT/ INR will followup in 2 weeks\par \par #LV thrombus- On coumadin , check INR today and recheck next week\par \par #HLD- continue Lipitor 80 mg daily\par

## 2023-01-03 NOTE — HISTORY OF PRESENT ILLNESS
[FreeTextEntry1] : 42 year old man with admission to [facility] from 12/11-12/19/22. Off of Losartan but has been taking Metop, \par Had AWMI with PCI to pLAD and POBA to diag\par TTE thereafter showed HFrEF with EF 30% and LV thrombus\par \par #CAD- AWMI with PCI to pLAD and POBA to diag\par On ASA and Plavix ( D/c Asa after 1/11/23) \par Will advise to discontinue ASA 1/11/23 and continue on Plavix alone since also on coumadin\par \par #HFrEF- Patient currently on Toprol\par BP is boderline so will hold off on starting aldactone/Entresto at this time\par Will consider restarting Losartan prior to adding Entresto and aldactone\par Check CMP and PT/ INR will followup in 2 weeks\par \par #LV thrombus- On coumadin , check INR today and recheck next week\par \par #HLD- continue Lipitor 80 mg daily\par

## 2023-01-04 ENCOUNTER — EMERGENCY (EMERGENCY)
Facility: HOSPITAL | Age: 43
LOS: 1 days | Discharge: HOME | End: 2023-01-04
Attending: STUDENT IN AN ORGANIZED HEALTH CARE EDUCATION/TRAINING PROGRAM | Admitting: STUDENT IN AN ORGANIZED HEALTH CARE EDUCATION/TRAINING PROGRAM
Payer: COMMERCIAL

## 2023-01-04 ENCOUNTER — NON-APPOINTMENT (OUTPATIENT)
Age: 43
End: 2023-01-04

## 2023-01-04 VITALS
OXYGEN SATURATION: 98 % | SYSTOLIC BLOOD PRESSURE: 97 MMHG | RESPIRATION RATE: 16 BRPM | DIASTOLIC BLOOD PRESSURE: 64 MMHG | HEART RATE: 77 BPM | TEMPERATURE: 98 F | HEIGHT: 67 IN

## 2023-01-04 LAB
ALBUMIN SERPL ELPH-MCNC: 4.6 G/DL — SIGNIFICANT CHANGE UP (ref 3.3–5)
ALP SERPL-CCNC: 46 U/L — SIGNIFICANT CHANGE UP (ref 40–120)
ALT FLD-CCNC: 30 U/L — SIGNIFICANT CHANGE UP (ref 4–41)
ANION GAP SERPL CALC-SCNC: 11 MMOL/L — SIGNIFICANT CHANGE UP (ref 7–14)
APTT BLD: 67.5 SEC — HIGH (ref 27–36.3)
AST SERPL-CCNC: 25 U/L — SIGNIFICANT CHANGE UP (ref 4–40)
BASOPHILS # BLD AUTO: 0.04 K/UL — SIGNIFICANT CHANGE UP (ref 0–0.2)
BASOPHILS NFR BLD AUTO: 0.5 % — SIGNIFICANT CHANGE UP (ref 0–2)
BILIRUB SERPL-MCNC: 0.6 MG/DL — SIGNIFICANT CHANGE UP (ref 0.2–1.2)
BUN SERPL-MCNC: 17 MG/DL — SIGNIFICANT CHANGE UP (ref 7–23)
CALCIUM SERPL-MCNC: 9.6 MG/DL — SIGNIFICANT CHANGE UP (ref 8.4–10.5)
CHLORIDE SERPL-SCNC: 103 MMOL/L — SIGNIFICANT CHANGE UP (ref 98–107)
CO2 SERPL-SCNC: 24 MMOL/L — SIGNIFICANT CHANGE UP (ref 22–31)
CREAT SERPL-MCNC: 1 MG/DL — SIGNIFICANT CHANGE UP (ref 0.5–1.3)
EGFR: 97 ML/MIN/1.73M2 — SIGNIFICANT CHANGE UP
EOSINOPHIL # BLD AUTO: 0.21 K/UL — SIGNIFICANT CHANGE UP (ref 0–0.5)
EOSINOPHIL NFR BLD AUTO: 2.5 % — SIGNIFICANT CHANGE UP (ref 0–6)
FLUAV AG NPH QL: SIGNIFICANT CHANGE UP
FLUBV AG NPH QL: SIGNIFICANT CHANGE UP
GLUCOSE SERPL-MCNC: 92 MG/DL — SIGNIFICANT CHANGE UP (ref 70–99)
HCT VFR BLD CALC: 48.2 % — SIGNIFICANT CHANGE UP (ref 39–50)
HGB BLD-MCNC: 15.6 G/DL — SIGNIFICANT CHANGE UP (ref 13–17)
IANC: 4.98 K/UL — SIGNIFICANT CHANGE UP (ref 1.8–7.4)
IMM GRANULOCYTES NFR BLD AUTO: 0.5 % — SIGNIFICANT CHANGE UP (ref 0–0.9)
INR BLD: 7.95 RATIO — SIGNIFICANT CHANGE UP (ref 0.88–1.16)
LYMPHOCYTES # BLD AUTO: 2.39 K/UL — SIGNIFICANT CHANGE UP (ref 1–3.3)
LYMPHOCYTES # BLD AUTO: 28.5 % — SIGNIFICANT CHANGE UP (ref 13–44)
MCHC RBC-ENTMCNC: 30.5 PG — SIGNIFICANT CHANGE UP (ref 27–34)
MCHC RBC-ENTMCNC: 32.4 GM/DL — SIGNIFICANT CHANGE UP (ref 32–36)
MCV RBC AUTO: 94.3 FL — SIGNIFICANT CHANGE UP (ref 80–100)
MONOCYTES # BLD AUTO: 0.74 K/UL — SIGNIFICANT CHANGE UP (ref 0–0.9)
MONOCYTES NFR BLD AUTO: 8.8 % — SIGNIFICANT CHANGE UP (ref 2–14)
NEUTROPHILS # BLD AUTO: 4.98 K/UL — SIGNIFICANT CHANGE UP (ref 1.8–7.4)
NEUTROPHILS NFR BLD AUTO: 59.2 % — SIGNIFICANT CHANGE UP (ref 43–77)
NRBC # BLD: 0 /100 WBCS — SIGNIFICANT CHANGE UP (ref 0–0)
NRBC # FLD: 0 K/UL — SIGNIFICANT CHANGE UP (ref 0–0)
PLATELET # BLD AUTO: 188 K/UL — SIGNIFICANT CHANGE UP (ref 150–400)
POTASSIUM SERPL-MCNC: 4.5 MMOL/L — SIGNIFICANT CHANGE UP (ref 3.5–5.3)
POTASSIUM SERPL-SCNC: 4.5 MMOL/L — SIGNIFICANT CHANGE UP (ref 3.5–5.3)
PROT SERPL-MCNC: 7.6 G/DL — SIGNIFICANT CHANGE UP (ref 6–8.3)
PROTHROM AB SERPL-ACNC: 94.2 SEC — SIGNIFICANT CHANGE UP (ref 10.5–13.4)
RBC # BLD: 5.11 M/UL — SIGNIFICANT CHANGE UP (ref 4.2–5.8)
RBC # FLD: 12.1 % — SIGNIFICANT CHANGE UP (ref 10.3–14.5)
RSV RNA NPH QL NAA+NON-PROBE: SIGNIFICANT CHANGE UP
SARS-COV-2 RNA SPEC QL NAA+PROBE: SIGNIFICANT CHANGE UP
SODIUM SERPL-SCNC: 138 MMOL/L — SIGNIFICANT CHANGE UP (ref 135–145)
WBC # BLD: 8.4 K/UL — SIGNIFICANT CHANGE UP (ref 3.8–10.5)
WBC # FLD AUTO: 8.4 K/UL — SIGNIFICANT CHANGE UP (ref 3.8–10.5)

## 2023-01-04 PROCEDURE — 99236 HOSP IP/OBS SAME DATE HI 85: CPT

## 2023-01-04 RX ORDER — DEXTROSE 50 % IN WATER 50 %
12.5 SYRINGE (ML) INTRAVENOUS ONCE
Refills: 0 | Status: DISCONTINUED | OUTPATIENT
Start: 2023-01-04 | End: 2023-01-08

## 2023-01-04 RX ORDER — ATORVASTATIN CALCIUM 80 MG/1
80 TABLET, FILM COATED ORAL AT BEDTIME
Refills: 0 | Status: DISCONTINUED | OUTPATIENT
Start: 2023-01-04 | End: 2023-01-08

## 2023-01-04 RX ORDER — INSULIN GLARGINE 100 [IU]/ML
20 INJECTION, SOLUTION SUBCUTANEOUS AT BEDTIME
Refills: 0 | Status: DISCONTINUED | OUTPATIENT
Start: 2023-01-04 | End: 2023-01-08

## 2023-01-04 RX ORDER — INSULIN LISPRO 100/ML
VIAL (ML) SUBCUTANEOUS AT BEDTIME
Refills: 0 | Status: DISCONTINUED | OUTPATIENT
Start: 2023-01-04 | End: 2023-01-08

## 2023-01-04 RX ORDER — ASPIRIN/CALCIUM CARB/MAGNESIUM 324 MG
81 TABLET ORAL DAILY
Refills: 0 | Status: DISCONTINUED | OUTPATIENT
Start: 2023-01-04 | End: 2023-01-08

## 2023-01-04 RX ORDER — SODIUM CHLORIDE 9 MG/ML
1000 INJECTION, SOLUTION INTRAVENOUS
Refills: 0 | Status: DISCONTINUED | OUTPATIENT
Start: 2023-01-04 | End: 2023-01-08

## 2023-01-04 RX ORDER — GLUCAGON INJECTION, SOLUTION 0.5 MG/.1ML
1 INJECTION, SOLUTION SUBCUTANEOUS ONCE
Refills: 0 | Status: DISCONTINUED | OUTPATIENT
Start: 2023-01-04 | End: 2023-01-08

## 2023-01-04 RX ORDER — CLOPIDOGREL BISULFATE 75 MG/1
75 TABLET, FILM COATED ORAL DAILY
Refills: 0 | Status: DISCONTINUED | OUTPATIENT
Start: 2023-01-04 | End: 2023-01-08

## 2023-01-04 RX ORDER — INSULIN LISPRO 100/ML
VIAL (ML) SUBCUTANEOUS
Refills: 0 | Status: DISCONTINUED | OUTPATIENT
Start: 2023-01-04 | End: 2023-01-08

## 2023-01-04 RX ORDER — DEXTROSE 50 % IN WATER 50 %
25 SYRINGE (ML) INTRAVENOUS ONCE
Refills: 0 | Status: DISCONTINUED | OUTPATIENT
Start: 2023-01-04 | End: 2023-01-08

## 2023-01-04 RX ORDER — METOPROLOL TARTRATE 50 MG
50 TABLET ORAL DAILY
Refills: 0 | Status: DISCONTINUED | OUTPATIENT
Start: 2023-01-04 | End: 2023-01-08

## 2023-01-04 RX ORDER — DEXTROSE 50 % IN WATER 50 %
15 SYRINGE (ML) INTRAVENOUS ONCE
Refills: 0 | Status: DISCONTINUED | OUTPATIENT
Start: 2023-01-04 | End: 2023-01-08

## 2023-01-04 RX ADMIN — ATORVASTATIN CALCIUM 80 MILLIGRAM(S): 80 TABLET, FILM COATED ORAL at 22:36

## 2023-01-04 RX ADMIN — INSULIN GLARGINE 20 UNIT(S): 100 INJECTION, SOLUTION SUBCUTANEOUS at 22:36

## 2023-01-04 NOTE — ED CDU PROVIDER INITIAL DAY NOTE - NS ED ATTENDING STATEMENT MOD
This was a shared visit with the ARIN. I reviewed and verified the documentation and independently performed the documented:

## 2023-01-04 NOTE — ED CDU PROVIDER INITIAL DAY NOTE - ATTENDING APP SHARED VISIT CONTRIBUTION OF CARE
42-year-old male past medical history CAD status post recent STEMI with stents, newly diagnosed reduced EF and LV thrombus on Coumadin presented to ED for elevated INR.  Patient without current active bleeding.  Denies any headache or abdominal pain or joint swelling or pain.  Patient without any reported bruising, gum bleeding.  INR 7.9 in ED exam as above  Plan observe in CDU, repeat INR in a.m., cardiology eval.

## 2023-01-04 NOTE — ED ADULT TRIAGE NOTE - CHIEF COMPLAINT QUOTE
Pt sent by MD for INR of 8.2. Pt on Warfarin. Denies any signs of bleeding, denies physical complaints.

## 2023-01-04 NOTE — ED PROVIDER NOTE - NS ED ROS FT
Constitutional: No fever. no chills.   Eyes: No visual changes, eye pain or redness  HEENT: No throat pain,   CV: No chest pain, no palpitations  Resp: No shortness of breath, no cough  GI: No abdominal pain. No nausea. no  vomiting. No diarrhea. No constipation.   : No dysuria, hematuria. no urinary frequency, no urinary urgency.  MSK: No musculoskeletal pain  Skin: No rash, lesions, no bruises  Neuro: No headache. No numbness or tingling. No weakness. No dizziness.  Heme: no easy bruising.  Allergy/Immunology: no allergy to medicine or food.

## 2023-01-04 NOTE — ED PROVIDER NOTE - OBJECTIVE STATEMENT
42-year-old male past medical history diabetes, CAD with recent admission to [facility] for STEMI status post JESUS to proximal LAD for 100% occlusion, per inpatient discharge summary reduced systolic function EF 30%, echocardiogram showing LV thrombus and now on warfarin presenting today for abnormal INR outpatient.  Patient had INR INR drawn yesterday outpatient reports elevated reviewing outpatient lab work shows an INR 8.28.  Patient denies any other recent changes in his medications.  Reports adherence to diet recommended while on warfarin.  Denies any headache, head injury, chest pain, abdominal pain, extremity pain, joint pain.  Denies any blood in his urine or stool.  Reports taking medication as prescribed.  Last dose of warfarin January 3 morning.

## 2023-01-04 NOTE — ED PROVIDER NOTE - PHYSICAL EXAMINATION
GEN: no acute respiratory distress. nontoxic, speaking comfortably in full sentences, ambulating with steady gait.  HEENT: NCAT. face symmetrical. PERRL 4mm, MMM, oropharynx wnl.  Neck: no JVD, trachea midline, no LAD  CV: RRR. +S1S2, no murmur. 2+ pulses in 4 extremities, cap refill <2 sec  Chest: CTA B/l. no wheezing, rales, rhonchi. no retractions. good air movement.   ABD: +BS, soft, non distended, non tender.   MSK: No clubbing, cyanosis, edema. FROM of all extremities. no tenderness to palpation. No midline or paraspinal tenderness.   Neuro: AAOX3. Sensation intact, motor 5/5 throughout.   SKIN: No rash

## 2023-01-04 NOTE — ED PROVIDER NOTE - CLINICAL SUMMARY MEDICAL DECISION MAKING FREE TEXT BOX
42-year-old male past medical history diabetes, CAD with recent diagnosis under percent proximal LAD occlusion, LV systolic dysfunction with EF of 30%, LV thrombus started on warfarin during last admission with outpatient INR of 8.28 drawn yesterday approximately 1 PM.  Recommended by his cardiologist to come to ED for evaluation today.  Prior admission documentation reviewed.  Patient reports adherence to vitamin K diet and adherence to medication as prescribed without taking additional doses.  Will recheck INR, LFTs, kidney function, platelet, reassess.  Patient does not have evidence of bleeding at this time.  No current indication for imaging.

## 2023-01-04 NOTE — ED ADULT NURSE NOTE - OBJECTIVE STATEMENT
p/t is a 42y old male with recent hx MI c.o of abnormal labs, p/t sent by PMD for high INR, p/t denies any chest pain, denies headaches, no abd pain, labs drawn and sent as per MD order

## 2023-01-04 NOTE — ED CDU PROVIDER INITIAL DAY NOTE - OBJECTIVE STATEMENT
Initial ED provider note: "42-year-old male past medical history diabetes, CAD with recent admission to [facility] for STEMI status post JESUS to proximal LAD for 100% occlusion, per inpatient discharge summary reduced systolic function EF 30%, echocardiogram showing LV thrombus and now on warfarin presenting today for abnormal INR outpatient.  Patient had INR INR drawn yesterday outpatient reports elevated reviewing outpatient lab work shows an INR 8.28.  Patient denies any other recent changes in his medications.  Reports adherence to diet recommended while on warfarin.  Denies any headache, head injury, chest pain, abdominal pain, extremity pain, joint pain.  Denies any blood in his urine or stool.  Reports taking medication as prescribed.  Last dose of warfarin January 3 morning."    CDU note: Agree with above. Pt with supratherapeutic INR. No acute complaints. Denies any bleeding. HD stable, well appearing, in no acute distress. Labs reviewed, INR 7.9. Sent in Cardiologist Dr. Emy Mcgrath. CDU plan to hold Coumadin and consult cards.

## 2023-01-04 NOTE — ED PROVIDER NOTE - PROGRESS NOTE DETAILS
Ricky Mota DO: Patient with supra therapeutic INR still LFTs and renal function normal.  Patient without headache.  There is no signs or symptoms of bleeding.  We will continue to hold Coumadin.  Will place patient in CDU for repeat INR and cardiology eval

## 2023-01-05 ENCOUNTER — NON-APPOINTMENT (OUTPATIENT)
Age: 43
End: 2023-01-05

## 2023-01-05 VITALS
RESPIRATION RATE: 16 BRPM | OXYGEN SATURATION: 100 % | DIASTOLIC BLOOD PRESSURE: 72 MMHG | TEMPERATURE: 99 F | HEART RATE: 79 BPM | SYSTOLIC BLOOD PRESSURE: 97 MMHG

## 2023-01-05 LAB
APTT BLD: 72.9 SEC — HIGH (ref 27–36.3)
BASOPHILS # BLD AUTO: 0.03 K/UL — SIGNIFICANT CHANGE UP (ref 0–0.2)
BASOPHILS NFR BLD AUTO: 0.5 % — SIGNIFICANT CHANGE UP (ref 0–2)
EOSINOPHIL # BLD AUTO: 0.21 K/UL — SIGNIFICANT CHANGE UP (ref 0–0.5)
EOSINOPHIL NFR BLD AUTO: 3.4 % — SIGNIFICANT CHANGE UP (ref 0–6)
HCT VFR BLD CALC: 49 % — SIGNIFICANT CHANGE UP (ref 39–50)
HGB BLD-MCNC: 15.8 G/DL — SIGNIFICANT CHANGE UP (ref 13–17)
IANC: 2.88 K/UL — SIGNIFICANT CHANGE UP (ref 1.8–7.4)
IMM GRANULOCYTES NFR BLD AUTO: 0.3 % — SIGNIFICANT CHANGE UP (ref 0–0.9)
INR BLD: 6.56 RATIO — CRITICAL HIGH (ref 0.88–1.16)
LYMPHOCYTES # BLD AUTO: 2.34 K/UL — SIGNIFICANT CHANGE UP (ref 1–3.3)
LYMPHOCYTES # BLD AUTO: 37.7 % — SIGNIFICANT CHANGE UP (ref 13–44)
MCHC RBC-ENTMCNC: 30.7 PG — SIGNIFICANT CHANGE UP (ref 27–34)
MCHC RBC-ENTMCNC: 32.2 GM/DL — SIGNIFICANT CHANGE UP (ref 32–36)
MCV RBC AUTO: 95.1 FL — SIGNIFICANT CHANGE UP (ref 80–100)
MONOCYTES # BLD AUTO: 0.72 K/UL — SIGNIFICANT CHANGE UP (ref 0–0.9)
MONOCYTES NFR BLD AUTO: 11.6 % — SIGNIFICANT CHANGE UP (ref 2–14)
NEUTROPHILS # BLD AUTO: 2.88 K/UL — SIGNIFICANT CHANGE UP (ref 1.8–7.4)
NEUTROPHILS NFR BLD AUTO: 46.5 % — SIGNIFICANT CHANGE UP (ref 43–77)
NRBC # BLD: 0 /100 WBCS — SIGNIFICANT CHANGE UP (ref 0–0)
NRBC # FLD: 0 K/UL — SIGNIFICANT CHANGE UP (ref 0–0)
PLATELET # BLD AUTO: 171 K/UL — SIGNIFICANT CHANGE UP (ref 150–400)
PROTHROM AB SERPL-ACNC: 77.6 SEC — HIGH (ref 10.5–13.4)
RBC # BLD: 5.15 M/UL — SIGNIFICANT CHANGE UP (ref 4.2–5.8)
RBC # FLD: 11.9 % — SIGNIFICANT CHANGE UP (ref 10.3–14.5)
WBC # BLD: 6.2 K/UL — SIGNIFICANT CHANGE UP (ref 3.8–10.5)
WBC # FLD AUTO: 6.2 K/UL — SIGNIFICANT CHANGE UP (ref 3.8–10.5)

## 2023-01-05 PROCEDURE — 99239 HOSP IP/OBS DSCHRG MGMT >30: CPT

## 2023-01-05 RX ADMIN — Medication 81 MILLIGRAM(S): at 11:40

## 2023-01-05 RX ADMIN — CLOPIDOGREL BISULFATE 75 MILLIGRAM(S): 75 TABLET, FILM COATED ORAL at 11:40

## 2023-01-05 NOTE — ED CDU PROVIDER SUBSEQUENT DAY NOTE - PHYSICAL EXAMINATION
Vital signs reviewed.   CONSTITUTIONAL: Well-appearing; well-nourished; in no apparent distress. Non-toxic appearing.   HEAD: Normocephalic, atraumatic.  EYES: conjunctiva and sclera WNL.  ENT: normal nose; no rhinorrhea  CARD: Normal S1, S2; no murmurs  RESP: Normal chest excursion with respiration; breath sounds clear and equal bilaterally; no wheezes, rhonchi, or rales.  EXT/MS: moves all extremities  SKIN: Normal for age and race  NEURO: Awake, alert, oriented x 3, no gross deficits

## 2023-01-05 NOTE — ED CDU PROVIDER DISPOSITION NOTE - PATIENT PORTAL LINK FT
You can access the FollowMyHealth Patient Portal offered by [facility] by registering at the following website: [email or web address]. By joining [facility]’s FollowMyHealth portal, you will also be able to view your health information using other applications (apps) compatible with our system.

## 2023-01-05 NOTE — ED CDU PROVIDER SUBSEQUENT DAY NOTE - PROGRESS NOTE DETAILS
Pt's INR this morning 6.9<7.5, trending down. Pt remains asymptomatic, no acute complaints. Cards consulted. Recommend dc home with F/U in clinic in 2 days. Continue to hold coumadin until next f/u in 2 days. Strict return precautions.

## 2023-01-05 NOTE — ED CDU PROVIDER DISPOSITION NOTE - ATTENDING CONTRIBUTION TO CARE
Mahin Perez DO:  patient seen and evaluated with the PA.  I was present for key portions of the History & Physical, and I agree with the Impression & Plan. 41 yo m pmh  CAD with recent admission to [facility] for STEMI status post JESUS to proximal LAD for 100% occlusion, per inpatient discharge summary reduced systolic function EF 30%, echocardiogram showing LV thrombus and now on warfarin presenting today for abnormal INR outpatient. cdu for obs and repeat INR and cards recs. pt reports no acute complaints. cards recs appreciated. provided pt strict return precautions. dc

## 2023-01-05 NOTE — ED CDU PROVIDER DISPOSITION NOTE - NS ED ATTENDING STATEMENT MOD
This was a shared visit with the [facility]. I reviewed and verified the documentation and independently performed the documented: Attending with

## 2023-01-05 NOTE — ED CDU PROVIDER DISPOSITION NOTE - ATTENDING APP SHARED VISIT CONTRIBUTION OF CARE
Mahin Perez DO:  patient seen and evaluated with the PA.  I was present for key portions of the History & Physical, and I agree with the Impression & Plan. 41 yo m pmh  CAD with recent admission to [facility] for STEMI status post JESUS to proximal LAD for 100% occlusion, per inpatient discharge summary reduced systolic function EF 30%, echocardiogram showing LV thrombus and now on warfarin presenting today for abnormal INR outpatient. cdu for obs and repeat INR and cards recs. pt reports no acute complaints. requesting IV dc, informed pt necessary while in OBS, pt relayed understanding. pending cards recs. Mahin Perez DO:  patient seen and evaluated with the PA.  I was present for key portions of the History & Physical, and I agree with the Impression & Plan. 41 yo m pmh  CAD with recent admission to [facility] for STEMI status post JESUS to proximal LAD for 100% occlusion, per inpatient discharge summary reduced systolic function EF 30%, echocardiogram showing LV thrombus and now on warfarin presenting today for abnormal INR outpatient. cdu for obs and repeat INR and cards recs. pt reports no acute complaints. cards recs appreciated. provided pt strict return precautions. dc

## 2023-01-05 NOTE — ED CDU PROVIDER SUBSEQUENT DAY NOTE - CLINICAL SUMMARY MEDICAL DECISION MAKING FREE TEXT BOX
Pt is a 42-year-old male past medical history diabetes, CAD with recent admission to [facility] for STEMI status post JESUS to proximal LAD for 100% occlusion, per inpatient discharge summary reduced systolic function EF 30%, echocardiogram showing LV thrombus and now on warfarin presenting today for abnormal INR outpatient.  Patient had INR drawn and it was elevated so was advised to drop his 10 mg warfarin to 7.5 mg, at re-check s/p change his INR still was elevated prompting him to be sent to ED.  Patient denies any other recent changes in his medications.  Reports adherence to diet recommended while on warfarin.  Denies any headache, head injury, chest pain, abdominal pain, extremity pain, joint pain, bleeding, melena.  Last dose of warfarin January 3 morning. In our ED patient INR 7.95. Pt transferred to CDU for; INR check in AM and to hold warfarin. Cards to be consulted in AM. Pt follows with Dr. Mcgrath.

## 2023-01-05 NOTE — ED CDU PROVIDER DISPOSITION NOTE - CLINICAL COURSE
42-year-old male past medical history diabetes, CAD with recent admission to [facility] for STEMI status post JESUS to proximal LAD for 100% occlusion, per inpatient discharge summary reduced systolic function EF 30%, echocardiogram showing LV thrombus and now on warfarin presenting today for abnormal INR outpatient.  Patient had INR drawn and it was elevated so was advised to drop his 10 mg warfarin to 7.5 mg, at re-check s/p change his INR still was elevated prompting him to be sent to ED.  Patient denies any other recent changes in his medications.  Reports adherence to diet recommended while on warfarin.  Denies any headache, head injury, chest pain, abdominal pain, extremity pain, joint pain, bleeding, melena.  Last dose of warfarin January 3 morning. In our ED patient INR 7.95. Pt transferred to CDU for; INR check in AM and to hold warfarin. Cards to be consulted in AM. Pt follows with Dr. Mcgrath. Pt's INR this morning 6.9<7.5, trending down. Pt remains asymptomatic, no acute complaints. Cards consulted. Recommend dc home with F/U in clinic in 2 days. Continue to hold coumadin until next f/u in 2 days. Strict return precautions.

## 2023-01-05 NOTE — ED CDU PROVIDER SUBSEQUENT DAY NOTE - HISTORY
Pt is a 42-year-old male past medical history diabetes, CAD with recent admission to [facility] for STEMI status post JESUS to proximal LAD for 100% occlusion, per inpatient discharge summary reduced systolic function EF 30%, echocardiogram showing LV thrombus and now on warfarin presenting today for abnormal INR outpatient.  Patient had INR drawn and it was elevated so was advised to drop his 10 mg warfarin to 7.5 mg, at re-check s/p change his INR still was elevated prompting him to be sent to ED.  Patient denies any other recent changes in his medications.  Reports adherence to diet recommended while on warfarin.  Denies any headache, head injury, chest pain, abdominal pain, extremity pain, joint pain, bleeding, melena.  Last dose of warfarin January 3 morning. In our ED patient INR 7.95. Pt transferred to CDU for; INR check in AM and to hold warfarin. Cards to be consulted in AM. Pt follows with Dr. Mcgrath.     In interim- Pt resting comfortably, vitals stable. No acute complaints. AM labs pending.

## 2023-01-05 NOTE — ED CDU PROVIDER DISPOSITION NOTE - NSFOLLOWUPINSTRUCTIONS_ED_ALL_ED_FT
Please follow up with Dr. Mcgrath in 2 days.    If you have any new, worsened or concerning symptoms, please return to the emergency department immediately.    Elevated INR    WHAT YOU NEED TO KNOW:    The INR, or International Normalized Ratio, is a measure of how long it takes your blood to clot. A prothrombin time (PT) is a another blood test done to help measure your INR. The higher your PT or INR, the longer your blood takes to clot. An elevated PT or INR means your blood is taking longer to clot than your healthcare provider believes is healthy for you. When your PT or INR is too high, you have an increased risk of bleeding.    DISCHARGE INSTRUCTIONS:    Prevent an elevated INR:   •Have your INR measured regularly. You may need to have your INR measured every few days until it is stable, and then only once a month. You may have blood drawn at the office of either your healthcare provider or a specialist. Some people can test their blood at home.      •If you take medicine, take it as directed. Contact your healthcare provider or specialist before you take other medicines or supplements, because they may elevate your INR.      •Eat the same amount of vitamin K daily to keep your INR stable. Vitamin K changes how your blood clots and affects your INR. Vitamin K is found in green leafy vegetables, broccoli, grapes, and other foods. Ask your healthcare provider for more information about what to eat when you have an elevated INR.      •Limit alcohol. Alcohol increases your INR. Ask your healthcare provider or specialist how much alcohol is safe for you.      •Do not smoke. If you smoke, it is never too late to quit. Smoking can affect the way your blood clots. Ask for information if you need help quitting.      Decrease your risk of bleeding:   •Avoid activities that may cause bleeding or bruising.      •Brush and shave gently. Use a soft toothbrush and an electric razor to avoid bleeding.      •Tell your dentist and other healthcare providers if you take anticoagulant medicine or have a bleeding disorder. Wear medical alert jewelry, or carry a card that gives this information. Ask where you can get these items.      Follow up with your healthcare provider or specialist as directed: You may need to return to have more tests. Write down your questions so you remember to ask them during your visits.    Contact your healthcare provider or specialist if:    •Your menstrual period is heavier than normal.    •You see blood in your urine.    •Your bowel movement is bloody or black.    •You bruise or bleed more than normal, your gums bleed, or you have frequent nosebleeds.    •You have pain or swelling in your joints.    •Your fingers or toes turn dark purple.    •You have more headaches than normal, or your headaches are different than before.    •You have questions or concerns about your care.      Return to the emergency department if:    •You throw up blood, or your vomit looks like coffee grounds.    •You have any kind of bleeding that does not stop in 15 minutes.    •Your leg feels warm, tender, and painful. It may look swollen and red.    You have any of the following signs of a stroke: ?Numbness or drooping on one side of your face     ?Weakness in an arm or leg    ?Confusion or difficulty speaking    ?Dizziness, a severe headache, or vision loss

## 2023-01-05 NOTE — ED CDU PROVIDER SUBSEQUENT DAY NOTE - ATTENDING APP SHARED VISIT CONTRIBUTION OF CARE
Mahin Perez DO:  patient seen and evaluated with the PA.  I was present for key portions of the History & Physical, and I agree with the Impression & Plan. 43 yo m pmh  CAD with recent admission to [facility] for STEMI status post JESUS to proximal LAD for 100% occlusion, per inpatient discharge summary reduced systolic function EF 30%, echocardiogram showing LV thrombus and now on warfarin presenting today for abnormal INR outpatient. cdu for obs and repeat INR and cards recs. pt reports no acute complaints. requesting IV dc, informed pt necessary while in OBS, pt relayed understanding. pending cards recs.

## 2023-01-05 NOTE — CHART NOTE - NSCHARTNOTEFT_GEN_A_CORE
Pt with MAUDE on 12/11/22 s/p JESUS to pLAD and POBA to DIAG c/b LV thrombus now on coumadin. He was found to have INR=8.28 in the clinic, sent to the ED. INR =6.5 this morning. Pt denies any evidence of bleeding, and has no other complaints. Pt instructed to hold coumadin for now, follow up in 2 days with Dr. Smyth for repeat INR check, and will get further instruction at that time.  Discussed with Dr. Haja Garcia, Cardiology Fellow, F-1    For all New Consults and Questions:  [email or web address]   Login: [email or web address]

## 2023-01-07 ENCOUNTER — LABORATORY RESULT (OUTPATIENT)
Age: 43
End: 2023-01-07

## 2023-01-09 ENCOUNTER — NON-APPOINTMENT (OUTPATIENT)
Age: 43
End: 2023-01-09

## 2023-01-12 ENCOUNTER — LABORATORY RESULT (OUTPATIENT)
Age: 43
End: 2023-01-12

## 2023-01-13 ENCOUNTER — OUTPATIENT (OUTPATIENT)
Dept: OUTPATIENT SERVICES | Facility: HOSPITAL | Age: 43
LOS: 1 days | Discharge: HOME | End: 2023-01-13

## 2023-01-13 ENCOUNTER — APPOINTMENT (OUTPATIENT)
Dept: ENDOCRINOLOGY | Facility: CLINIC | Age: 43
End: 2023-01-13
Payer: COMMERCIAL

## 2023-01-13 ENCOUNTER — APPOINTMENT (OUTPATIENT)
Dept: MEDICATION MANAGEMENT | Facility: CLINIC | Age: 43
End: 2023-01-13

## 2023-01-13 VITALS
DIASTOLIC BLOOD PRESSURE: 70 MMHG | HEART RATE: 84 BPM | WEIGHT: 173 LBS | HEIGHT: 70 IN | BODY MASS INDEX: 24.77 KG/M2 | SYSTOLIC BLOOD PRESSURE: 100 MMHG | OXYGEN SATURATION: 98 %

## 2023-01-13 DIAGNOSIS — Z87.891 PERSONAL HISTORY OF NICOTINE DEPENDENCE: ICD-10-CM

## 2023-01-13 DIAGNOSIS — Z83.3 FAMILY HISTORY OF DIABETES MELLITUS: ICD-10-CM

## 2023-01-13 DIAGNOSIS — Z79.01 LONG TERM (CURRENT) USE OF ANTICOAGULANTS: ICD-10-CM

## 2023-01-13 DIAGNOSIS — Z82.3 FAMILY HISTORY OF STROKE: ICD-10-CM

## 2023-01-13 LAB
CREAT SPEC-SCNC: 76 MG/DL
HBA1C MFR BLD HPLC: 8.3
MICROALBUMIN 24H UR DL<=1MG/L-MCNC: <1.2 MG/DL
MICROALBUMIN/CREAT 24H UR-RTO: NORMAL MG/G

## 2023-01-13 PROCEDURE — 99215 OFFICE O/P EST HI 40 MIN: CPT | Mod: 25

## 2023-01-13 PROCEDURE — 83036 HEMOGLOBIN GLYCOSYLATED A1C: CPT | Mod: QW

## 2023-01-17 ENCOUNTER — LABORATORY RESULT (OUTPATIENT)
Age: 43
End: 2023-01-17

## 2023-01-17 RX ORDER — BLOOD SUGAR DIAGNOSTIC
STRIP MISCELLANEOUS
Qty: 3 | Refills: 1 | Status: ACTIVE | COMMUNITY
Start: 2023-01-17 | End: 1900-01-01

## 2023-01-17 RX ORDER — EMPAGLIFLOZIN 10 MG/1
10 TABLET, FILM COATED ORAL DAILY
Qty: 90 | Refills: 1 | Status: ACTIVE | COMMUNITY
Start: 1900-01-01 | End: 1900-01-01

## 2023-01-17 RX ORDER — ISOPROPYL ALCOHOL 0.7 ML/ML
SWAB TOPICAL
Qty: 2 | Refills: 1 | Status: ACTIVE | COMMUNITY
Start: 2023-01-17 | End: 1900-01-01

## 2023-01-17 RX ORDER — LANCETS 28 GAUGE
EACH MISCELLANEOUS
Qty: 3 | Refills: 1 | Status: ACTIVE | COMMUNITY
Start: 2023-01-17 | End: 1900-01-01

## 2023-01-18 ENCOUNTER — OUTPATIENT (OUTPATIENT)
Dept: OUTPATIENT SERVICES | Facility: HOSPITAL | Age: 43
LOS: 1 days | Discharge: HOME | End: 2023-01-18

## 2023-01-18 ENCOUNTER — NON-APPOINTMENT (OUTPATIENT)
Age: 43
End: 2023-01-18

## 2023-01-18 ENCOUNTER — APPOINTMENT (OUTPATIENT)
Dept: MEDICATION MANAGEMENT | Facility: CLINIC | Age: 43
End: 2023-01-18

## 2023-01-20 DIAGNOSIS — I51.3 INTRACARDIAC THROMBOSIS, NOT ELSEWHERE CLASSIFIED: ICD-10-CM

## 2023-01-20 DIAGNOSIS — Z79.01 LONG TERM (CURRENT) USE OF ANTICOAGULANTS: ICD-10-CM

## 2023-01-21 ENCOUNTER — LABORATORY RESULT (OUTPATIENT)
Age: 43
End: 2023-01-21

## 2023-01-23 ENCOUNTER — OUTPATIENT (OUTPATIENT)
Dept: OUTPATIENT SERVICES | Facility: HOSPITAL | Age: 43
LOS: 1 days | Discharge: HOME | End: 2023-01-23

## 2023-01-23 ENCOUNTER — APPOINTMENT (OUTPATIENT)
Dept: MEDICATION MANAGEMENT | Facility: CLINIC | Age: 43
End: 2023-01-23

## 2023-01-23 DIAGNOSIS — Z79.01 LONG TERM (CURRENT) USE OF ANTICOAGULANTS: ICD-10-CM

## 2023-01-23 DIAGNOSIS — I51.3 INTRACARDIAC THROMBOSIS, NOT ELSEWHERE CLASSIFIED: ICD-10-CM

## 2023-01-26 ENCOUNTER — LABORATORY RESULT (OUTPATIENT)
Age: 43
End: 2023-01-26

## 2023-01-27 ENCOUNTER — OUTPATIENT (OUTPATIENT)
Dept: OUTPATIENT SERVICES | Facility: HOSPITAL | Age: 43
LOS: 1 days | Discharge: HOME | End: 2023-01-27

## 2023-01-27 ENCOUNTER — APPOINTMENT (OUTPATIENT)
Dept: MEDICATION MANAGEMENT | Facility: CLINIC | Age: 43
End: 2023-01-27

## 2023-01-27 DIAGNOSIS — Z79.01 LONG TERM (CURRENT) USE OF ANTICOAGULANTS: ICD-10-CM

## 2023-01-27 DIAGNOSIS — I51.3 INTRACARDIAC THROMBOSIS, NOT ELSEWHERE CLASSIFIED: ICD-10-CM

## 2023-02-06 ENCOUNTER — APPOINTMENT (OUTPATIENT)
Dept: MEDICATION MANAGEMENT | Facility: CLINIC | Age: 43
End: 2023-02-06

## 2023-02-06 ENCOUNTER — OUTPATIENT (OUTPATIENT)
Dept: OUTPATIENT SERVICES | Facility: HOSPITAL | Age: 43
LOS: 1 days | End: 2023-02-06
Payer: COMMERCIAL

## 2023-02-06 ENCOUNTER — LABORATORY RESULT (OUTPATIENT)
Age: 43
End: 2023-02-06

## 2023-02-06 DIAGNOSIS — I51.3 INTRACARDIAC THROMBOSIS, NOT ELSEWHERE CLASSIFIED: ICD-10-CM

## 2023-02-06 DIAGNOSIS — Z79.01 LONG TERM (CURRENT) USE OF ANTICOAGULANTS: ICD-10-CM

## 2023-02-06 PROCEDURE — 99211 OFF/OP EST MAY X REQ PHY/QHP: CPT | Mod: 95

## 2023-02-14 ENCOUNTER — NON-APPOINTMENT (OUTPATIENT)
Age: 43
End: 2023-02-14

## 2023-02-14 ENCOUNTER — APPOINTMENT (OUTPATIENT)
Dept: CARDIOLOGY | Facility: CLINIC | Age: 43
End: 2023-02-14
Payer: COMMERCIAL

## 2023-02-14 VITALS
HEIGHT: 70 IN | SYSTOLIC BLOOD PRESSURE: 124 MMHG | WEIGHT: 174 LBS | HEART RATE: 91 BPM | TEMPERATURE: 98.5 F | BODY MASS INDEX: 24.91 KG/M2 | OXYGEN SATURATION: 98 % | DIASTOLIC BLOOD PRESSURE: 88 MMHG

## 2023-02-14 PROCEDURE — 93000 ELECTROCARDIOGRAM COMPLETE: CPT

## 2023-02-14 PROCEDURE — 99214 OFFICE O/P EST MOD 30 MIN: CPT

## 2023-02-14 RX ORDER — ASPIRIN 81 MG/1
81 TABLET, CHEWABLE ORAL
Refills: 0 | Status: DISCONTINUED | COMMUNITY
End: 2023-02-14

## 2023-02-14 NOTE — HISTORY OF PRESENT ILLNESS
[FreeTextEntry1] : 42 year old man with admission to [facility] from 12/11-12/19/22. Off of Losartan but has been taking Metop, \par Had AWMI with PCI to pLAD and POBA to diag\par TTE thereafter showed HFrEF with EF 30% and LV thrombus\par \par #CAD- AWMI with PCI to pLAD and POBA to diag\par On Plavix and Coumadin\par \par #HFrEF- Patient currently on Toprol\par BP is better now, will start Losartan \par \par #LV thrombus- On coumadin , check INR today and recheck next week\par \par #HLD- continue Lipitor 80 mg daily\par

## 2023-02-14 NOTE — DISCUSSION/SUMMARY
[FreeTextEntry1] : 42 year old man with history of DM HTN HLD with recent AWMI and PCI to LAD and POBA to Daig\par \par \par #CAD- AWMI with PCI to pLAD and POBA to diag\par On Plavix and Coumadin\par \par #HFrEF- Patient currently on Toprol\par BP is better now, will start Losartan \par \par #LV thrombus- On coumadin , check INR today and recheck next week\par \par #HLD- continue Lipitor 80 mg daily\par \par  [EKG obtained to assist in diagnosis and management of assessed problem(s)] : EKG obtained to assist in diagnosis and management of assessed problem(s)

## 2023-03-02 DIAGNOSIS — Z79.01 LONG TERM (CURRENT) USE OF ANTICOAGULANTS: ICD-10-CM

## 2023-03-02 DIAGNOSIS — I51.3 INTRACARDIAC THROMBOSIS, NOT ELSEWHERE CLASSIFIED: ICD-10-CM

## 2023-03-02 LAB
INR PPP: 1.83 RATIO
PT BLD: 21.7 SEC

## 2023-03-03 ENCOUNTER — APPOINTMENT (OUTPATIENT)
Dept: MEDICATION MANAGEMENT | Facility: CLINIC | Age: 43
End: 2023-03-03

## 2023-03-03 ENCOUNTER — OUTPATIENT (OUTPATIENT)
Dept: OUTPATIENT SERVICES | Facility: HOSPITAL | Age: 43
LOS: 1 days | End: 2023-03-03
Payer: COMMERCIAL

## 2023-03-03 DIAGNOSIS — Z79.01 LONG TERM (CURRENT) USE OF ANTICOAGULANTS: ICD-10-CM

## 2023-03-03 DIAGNOSIS — I23.6 THROMBOSIS OF ATRIUM, AURICULAR APPENDAGE, AND VENTRICLE AS CURRENT COMPLICATIONS FOLLOWING ACUTE MYOCARDIAL INFARCTION: ICD-10-CM

## 2023-03-03 PROCEDURE — 99211 OFF/OP EST MAY X REQ PHY/QHP: CPT | Mod: 95

## 2023-03-04 DIAGNOSIS — I23.6 THROMBOSIS OF ATRIUM, AURICULAR APPENDAGE, AND VENTRICLE AS CURRENT COMPLICATIONS FOLLOWING ACUTE MYOCARDIAL INFARCTION: ICD-10-CM

## 2023-03-04 DIAGNOSIS — Z79.01 LONG TERM (CURRENT) USE OF ANTICOAGULANTS: ICD-10-CM

## 2023-03-09 PROBLEM — Z83.3 FAMILY HISTORY OF DIABETES MELLITUS: Status: ACTIVE | Noted: 2023-03-09

## 2023-03-09 PROBLEM — Z82.3 FAMILY HISTORY OF CEREBROVASCULAR ACCIDENT (CVA): Status: ACTIVE | Noted: 2023-03-09

## 2023-03-09 PROBLEM — Z87.891 FORMER SMOKER: Status: ACTIVE | Noted: 2023-03-09

## 2023-03-09 NOTE — ASSESSMENT
[Diabetes Foot Care] : diabetes foot care [Long Term Vascular Complications] : long term vascular complications of diabetes [Carbohydrate Consistent Diet] : carbohydrate consistent diet [Importance of Diet and Exercise] : importance of diet and exercise to improve glycemic control, achieve weight loss and improve cardiovascular health [Exercise/Effect on Glucose] : exercise/effect on glucose [Hypoglycemia Management] : hypoglycemia management [Self Monitoring of Blood Glucose] : self monitoring of blood glucose [Retinopathy Screening] : Patient was referred to ophthalmology for retinopathy screening [FreeTextEntry1] : 41 yo M with PMH of HLD, CAD s/p STEMI with PCI c/b reduced EF, chronic heart failure, here for hospital follow-up of Type 2 Diabetes.\par \par Type 2 Diabetes, uncontrolled:\par - A1c 10.1% today (12/11/2022).\par - Glucose log reviewed today (see HPI).\par - Will decrease Semglee to 22 units qhs.\par - Continue metformin 500 mg BID for now (as BG is stable and h/o new HF).\par - Continue Jardiance 10 mg qd.\par - Will consider GLP-1 in the future for glycemic control and cardiac protection, with goal to decrease insulin with time. Need ophtho visit first.\par - R+B of GLP-1 discussed with the patient.\par - Discussed hypoglycemia management.\par - Discussed diet modification, carb consistent diet, and exercise. Patient has made significant dietary changes. \par - Continue SMBG 2-3x daily (keep glucose log and send readings to us). Call if persistent highs or lows.\par - F/u with ophthalmology and podiatry annually. Not UTD with ophtho, referral provided. \par \par Blood Pressure/Heart Failure:\par - Goal BP <130/80, /70 today.\par - On metoprolol ER 50 mg qd. ARB on hold due to borderline BP. Continue management as per cardiologist.\par - eGFR 97 (1/4/2023). Check urine alb/cr today.\par \par HLD:\par - Goal LDL <70. , TG 53 (12/2022).\par - On atorvastatin 80 mg qd. Continue management as per cardiologist.\par \par F/u in 2 months with Dr. Gardner.\par \par Desirae Pastor NP (Brenda)

## 2023-03-09 NOTE — PHYSICAL EXAM
[Alert] : alert [No Acute Distress] : no acute distress [Normal Sclera/Conjunctiva] : normal sclera/conjunctiva [EOMI] : extra ocular movement intact [No Proptosis] : no proptosis [Thyroid Not Enlarged] : the thyroid was not enlarged [No Respiratory Distress] : no respiratory distress [No Accessory Muscle Use] : no accessory muscle use [Clear to Auscultation] : lungs were clear to auscultation bilaterally [Normal S1, S2] : normal S1 and S2 [Normal Rate] : heart rate was normal [Regular Rhythm] : with a regular rhythm [No Edema] : no peripheral edema [Normal Bowel Sounds] : normal bowel sounds [Not Tender] : non-tender [Soft] : abdomen soft [No Spinal Tenderness] : no spinal tenderness [Normal Gait] : normal gait [No Rash] : no rash [Right foot was examined, including] : right foot ~C was examined, including visual inspection with sensory and pulse exams [Left foot was examined, including] : left foot ~C was examined, including visual inspection with sensory and pulse exams [Normal] : normal [2+] : 2+ in the dorsalis pedis [Diminished Throughout Both Feet] : normal tactile sensation with monofilament testing throughout both feet [Normal Reflexes] : deep tendon reflexes were 2+ and symmetric [Oriented x3] : oriented to person, place, and time [Normal Affect] : the affect was normal [Normal Mood] : the mood was normal [FreeTextEntry1] : callus [FreeTextEntry5] : callus

## 2023-03-09 NOTE — HISTORY OF PRESENT ILLNESS
[FreeTextEntry1] : 43 yo M with PMH of HLD, CAD s/p MI with PCI c/b reduced EF, HF, here for hospital follow-up of Type 2 Diabetes.\par \par Hospital Course (12/11-12/19/22):\par 42-year-old Romanian male smoker with history of DM2 who presented with 10/10 chest pain. Found to have a STEMI with 100% occlusion of proximal LAD c/b reduced ef /w EF 30% now s/p PCI and stenting. Endocrinology for DM2 management given persistent hyperglycemia.\par Patient reports DM2 for the last 3 years. He was started on "some pills". He cant recall their names but chart reports he was taking janumet. Patient report he stopped taking his medications over a year ago because he was feeling fine. Previously managed by his PCP but he has not followed up recently. Patient admits to polydipsia, worsening vision, and tingling/numbness in fingers and toes. Denies polyuria or nocturia.\par  \par #D/C recommendations\par -start metformin 500mg BID and increase to 1000mg BID after 1 week\par -start either jardiance 10mg daily or farxiga 5mg daily depending on insurance\par - this will provide further glycemic control and also reduce complications and\par hospitalizations from CHF given low EF at 30%\par -consider starting GLP1 agonist as outpatient\par  \par 1. DM 2 with hyperglycemia\par Uncontrolled, A1c 10.1%\par Home Regimen: None, was taking Janumet at some point in the past\par  \par Discharge Plan:\par Recommend basal + oral regimen for discharge. Patient is agreeable. Reviewed\par lifestyle and dietary modifications. Given current insulin requirements,\par concern he may require bolus insulin as outpatient - followup with\par PCP/endocrinology for further titration and monitoring.\par Semglee PEN preferred by insurance therefore recommend: 26 units SQ qHS\par START Metformin 500 mg PO BID, increase to 1000 mg PO BID after 1 week (note,\par although he has CHF with reduced EF, he is currently stable with acceptable GFR\par and lactate levels. Ok to use Metformin)\par START SGLT2i: Jardiance 10 mg PO daily (covered by insurance for $15 per\par primary team) this will provide further glycemic control and also reduce\par complications and hospitalizations from CHF\par STOP Janumet (per patient he was not recently taking)\par Can consider GLP1RA as outpatient\par In regards to SGLT2i, patient reports +hx of kidney stone, no hx of UTI or\par genital mycotic infections\par \par Current HPI:\par Diagnosis: 6 years ago\par Previous Endo: none, was managed by PCP \par Current DM Medications: Semglee 26 units qhs, metformin 500 mg BID, Jardiance 10 mg qd (all started 12/2022 on discharge from hospital)\par Past DM medications: was on insulin prior in 2018 (needed glycemic control before kidney stone removal), maybe oral DM meds (was possibly on Janumet for a period of time)\par A1c: 10.1% (12/11/2022) <-- 9.9% (7/2017).\par \par Macrovascular Complications: +CAD s/p MI with stent c/b reduced EF of 30% (12/2022). Denies CVA. F/b cardiologist Dr. Smyth.\par Eyes: Last ophthalmology visit >1 year ago. Denies h/o of diabetic retinopathy. No blurry vision.\par Feet: Does not see podiatry. Denies h/o neuropathy. No h/o sores or ulcers.\par Nephrology: eGFR 97 (1/4/2023). Denies h/o nephropathy. +Kidney stone x 1 (2018). Denies h/o UTIs or genital mycotic infections.\par Symptoms: Reports polyuria and polydipsia have improved. Denies CP, SOB, nausea, headache, dizziness. Weight stable.\par \par SMBG: checks 3x/day (Freestyle Lite glucometer)\par Glucose log reviewed today: \par fasting- 152, 111, 88, 91, 128, 110, 106, 105, 66, 139, 90, 71, 92, 74, 75, 78, 78, 77, 103, 93, 99, 117, 130, 108\par pre-lunch- 125, 130, 112, 112, 132, 120, 112, 122, 103, 102, 130, 131, 120, 102, 98, 106, 104, 111, 112, 112, 119, 125, 97\par pre-dinner- 100, 133, 106, 130, 126, 133, 99, 128, 106, 109, 189, 126, 116, 69, 131, 163, 126, 85, 129, 129, 135, 154, 140\par Rare hypoglycemia. One time was in the 60s and felt dizzy. If low at night will skip Semglee. \par \par Current Diet:  Used to have 's fast food daily. States has been eating much healthier since discharge.\par breakfast- small bowl of oatmeal, decaf coffee without sugar\par lunch- meat, small salad, a fistful of brown rice, or sweet potatoes, or whole wheat bread\par midday snack- ww bread with pb in afternoon\par dinner- fish, 1 ww roti, no vegetables\par drinks- water, no juice or soda\par Keeping fruit intake to a minimum.\par Exercise: walking\par \par HLD: , HDL 60, TG 53 (12/2022). 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

## 2023-03-09 NOTE — REVIEW OF SYSTEMS
[Visual Field Defect] : no visual field defect [Blurred Vision] : no blurred vision [Chest Pain] : no chest pain [Shortness Of Breath] : no shortness of breath [Polyuria] : no polyuria [Pain/Numbness of Digits] : no pain/numbness of digits [Polydipsia] : no polydipsia [Negative] : Heme/Lymph

## 2023-03-10 ENCOUNTER — NON-APPOINTMENT (OUTPATIENT)
Age: 43
End: 2023-03-10

## 2023-03-10 ENCOUNTER — APPOINTMENT (OUTPATIENT)
Dept: ENDOCRINOLOGY | Facility: CLINIC | Age: 43
End: 2023-03-10
Payer: COMMERCIAL

## 2023-03-10 VITALS
OXYGEN SATURATION: 97 % | DIASTOLIC BLOOD PRESSURE: 78 MMHG | BODY MASS INDEX: 26.48 KG/M2 | WEIGHT: 185 LBS | HEART RATE: 86 BPM | HEIGHT: 70 IN | SYSTOLIC BLOOD PRESSURE: 118 MMHG

## 2023-03-10 PROCEDURE — 99215 OFFICE O/P EST HI 40 MIN: CPT

## 2023-03-10 NOTE — ASSESSMENT
[FreeTextEntry1] : #Type 2 diabetes\par -Patient with history of type 2 diabetes for the past 6 years however was poorly controlled at the time.  Patient presented to the hospital in December 2022 for a STEMI with subsequent PCI.  At the time it was noted that his hemoglobin A1c was 10% without any medications.\par -At discharge, he was started on Semglee 26 units daily along with metformin and Jardiance.\par -He had seen the nurse practitioner in the meantime, his Semglee was decreased from 26units to 22 units\par -Reviewed patient's blood sugar log, blood sugars appear to be majority at goal\par Plan:\par -Recommend to increase Jardiance to 25 mg daily for maximum glycemic control especially in the setting of heart failure\par -Continue with metformin 500 mg twice daily\par -Can decrease Semglee down to 20 units daily\par -Next visit, will discuss introduction of GLP-1 agonist for decreasing his insulin requirements further\par -We will check labs today\par \par Patient counseled extensively about the complications of diabetes including but not limited to nephropathy, neuropathy, and retinopathy. We discussed the importance of annual foot and optho exams. Explained that ideally blood sugars in the morning prior to breakfast should be between 80 and 130. Blood sugars should be checked 2 hours after eating and should be <180. If blood sugar is <70, patient should treat the blood sugar FIRST and then contact provider. Advised patient to let us know if BG persistently <70 or >200 \par \par #Hyperlipidemia\par -Continue with atorvastatin 80 mg\par \par #Hypertension\par -Blood pressure stable today\par -Patient is already on ARB with losartan\par -We will check an albumin to creatinine ratio

## 2023-03-13 ENCOUNTER — NON-APPOINTMENT (OUTPATIENT)
Age: 43
End: 2023-03-13

## 2023-03-13 LAB
25(OH)D3 SERPL-MCNC: 19.2 NG/ML
ALBUMIN SERPL ELPH-MCNC: 4.8 G/DL
ALP BLD-CCNC: 51 U/L
ALT SERPL-CCNC: 31 U/L
ANION GAP SERPL CALC-SCNC: 13 MMOL/L
AST SERPL-CCNC: 30 U/L
BILIRUB SERPL-MCNC: 1 MG/DL
BUN SERPL-MCNC: 19 MG/DL
CALCIUM SERPL-MCNC: 10.4 MG/DL
CHLORIDE SERPL-SCNC: 102 MMOL/L
CHOLEST SERPL-MCNC: 165 MG/DL
CO2 SERPL-SCNC: 25 MMOL/L
CREAT SERPL-MCNC: 0.86 MG/DL
CREAT SPEC-SCNC: 75 MG/DL
EGFR: 112 ML/MIN/1.73M2
ESTIMATED AVERAGE GLUCOSE: 174 MG/DL
GLUCOSE SERPL-MCNC: 90 MG/DL
HBA1C MFR BLD HPLC: 7.7 %
HDLC SERPL-MCNC: 57 MG/DL
LDLC SERPL CALC-MCNC: 89 MG/DL
MICROALBUMIN 24H UR DL<=1MG/L-MCNC: <1.2 MG/DL
MICROALBUMIN/CREAT 24H UR-RTO: NORMAL MG/G
NONHDLC SERPL-MCNC: 108 MG/DL
POTASSIUM SERPL-SCNC: 4.4 MMOL/L
PROT SERPL-MCNC: 7.7 G/DL
SODIUM SERPL-SCNC: 140 MMOL/L
TRIGL SERPL-MCNC: 95 MG/DL
TSH SERPL-ACNC: 1.3 UIU/ML

## 2023-03-18 ENCOUNTER — LABORATORY RESULT (OUTPATIENT)
Age: 43
End: 2023-03-18

## 2023-03-22 ENCOUNTER — OUTPATIENT (OUTPATIENT)
Dept: OUTPATIENT SERVICES | Facility: HOSPITAL | Age: 43
LOS: 1 days | End: 2023-03-22
Payer: COMMERCIAL

## 2023-03-22 ENCOUNTER — APPOINTMENT (OUTPATIENT)
Dept: MEDICATION MANAGEMENT | Facility: CLINIC | Age: 43
End: 2023-03-22

## 2023-03-22 PROCEDURE — G0463: CPT

## 2023-03-23 DIAGNOSIS — Z79.01 LONG TERM (CURRENT) USE OF ANTICOAGULANTS: ICD-10-CM

## 2023-03-23 DIAGNOSIS — I23.6 THROMBOSIS OF ATRIUM, AURICULAR APPENDAGE, AND VENTRICLE AS CURRENT COMPLICATIONS FOLLOWING ACUTE MYOCARDIAL INFARCTION: ICD-10-CM

## 2023-03-24 ENCOUNTER — APPOINTMENT (OUTPATIENT)
Dept: MEDICATION MANAGEMENT | Facility: CLINIC | Age: 43
End: 2023-03-24

## 2023-03-24 ENCOUNTER — APPOINTMENT (OUTPATIENT)
Dept: CARDIOLOGY | Facility: CLINIC | Age: 43
End: 2023-03-24
Payer: COMMERCIAL

## 2023-03-24 VITALS
WEIGHT: 178 LBS | HEIGHT: 70 IN | SYSTOLIC BLOOD PRESSURE: 113 MMHG | OXYGEN SATURATION: 97 % | BODY MASS INDEX: 25.48 KG/M2 | DIASTOLIC BLOOD PRESSURE: 75 MMHG | HEART RATE: 92 BPM

## 2023-03-24 DIAGNOSIS — Z79.01 LONG TERM (CURRENT) USE OF ANTICOAGULANTS: ICD-10-CM

## 2023-03-24 DIAGNOSIS — I23.6 THROMBOSIS OF ATRIUM, AURICULAR APPENDAGE, AND VENTRICLE AS CURRENT COMPLICATIONS FOLLOWING ACUTE MYOCARDIAL INFARCTION: ICD-10-CM

## 2023-03-24 PROCEDURE — 99214 OFFICE O/P EST MOD 30 MIN: CPT

## 2023-03-24 PROCEDURE — 93000 ELECTROCARDIOGRAM COMPLETE: CPT

## 2023-03-24 RX ORDER — LOSARTAN POTASSIUM 25 MG/1
25 TABLET, FILM COATED ORAL DAILY
Qty: 1 | Refills: 1 | Status: DISCONTINUED | COMMUNITY
Start: 2023-02-14 | End: 2023-03-24

## 2023-03-24 NOTE — HISTORY OF PRESENT ILLNESS
[FreeTextEntry1] : 42 year old man with admission to [facility] from 12/11-12/19/22. Off of Losartan but has been taking Metop, \par Had AWMI with PCI to pLAD and POBA to diag\par TTE thereafter showed HFrEF with EF 30% and LV thrombus\par \par #CAD- AWMI with PCI to pLAD and POBA to diag\par On Plavix and Coumadin\par \par #HFrEF- Patient currently on Toprol and Losartan (started last visit)\par Tolerating well- will change Losartan to Entresto 24-26 mg BID\par Check TTE\par \par #LV thrombus- On coumadin , check INR today and recheck next week\par \par #HLD- continue Lipitor 80 mg daily

## 2023-03-24 NOTE — DISCUSSION/SUMMARY
[EKG obtained to assist in diagnosis and management of assessed problem(s)] : EKG obtained to assist in diagnosis and management of assessed problem(s) [FreeTextEntry1] : 42 year old man with history of DM HTN HLD with recent AWMI and PCI to LAD and POBA to Daig\par \par #CAD- AWMI with PCI to pLAD and POBA to diag\par On Plavix and Coumadin\par \par #HFrEF- Patient currently on Toprol and Losartan (started last visit)\par Tolerating well- will change Losartan to Entresto 24-26 mg BID\par Check TTE\par \par #LV thrombus- On coumadin , check INR today and recheck next week\par \par #HLD- continue Lipitor 80 mg daily\par \par

## 2023-03-27 ENCOUNTER — LABORATORY RESULT (OUTPATIENT)
Age: 43
End: 2023-03-27

## 2023-03-27 ENCOUNTER — OUTPATIENT (OUTPATIENT)
Dept: OUTPATIENT SERVICES | Facility: HOSPITAL | Age: 43
LOS: 1 days | End: 2023-03-27
Payer: COMMERCIAL

## 2023-03-27 ENCOUNTER — NON-APPOINTMENT (OUTPATIENT)
Age: 43
End: 2023-03-27

## 2023-03-27 ENCOUNTER — APPOINTMENT (OUTPATIENT)
Dept: MEDICATION MANAGEMENT | Facility: CLINIC | Age: 43
End: 2023-03-27

## 2023-03-27 DIAGNOSIS — Z79.01 LONG TERM (CURRENT) USE OF ANTICOAGULANTS: ICD-10-CM

## 2023-03-27 PROCEDURE — 99211 OFF/OP EST MAY X REQ PHY/QHP: CPT | Mod: 95

## 2023-03-28 DIAGNOSIS — I23.6 THROMBOSIS OF ATRIUM, AURICULAR APPENDAGE, AND VENTRICLE AS CURRENT COMPLICATIONS FOLLOWING ACUTE MYOCARDIAL INFARCTION: ICD-10-CM

## 2023-03-29 DIAGNOSIS — Z79.01 LONG TERM (CURRENT) USE OF ANTICOAGULANTS: ICD-10-CM

## 2023-03-29 DIAGNOSIS — I23.6 THROMBOSIS OF ATRIUM, AURICULAR APPENDAGE, AND VENTRICLE AS CURRENT COMPLICATIONS FOLLOWING ACUTE MYOCARDIAL INFARCTION: ICD-10-CM

## 2023-04-01 ENCOUNTER — LABORATORY RESULT (OUTPATIENT)
Age: 43
End: 2023-04-01

## 2023-04-03 ENCOUNTER — APPOINTMENT (OUTPATIENT)
Dept: MEDICATION MANAGEMENT | Facility: CLINIC | Age: 43
End: 2023-04-03

## 2023-04-07 ENCOUNTER — APPOINTMENT (OUTPATIENT)
Dept: MEDICATION MANAGEMENT | Facility: CLINIC | Age: 43
End: 2023-04-07

## 2023-04-07 ENCOUNTER — OUTPATIENT (OUTPATIENT)
Dept: OUTPATIENT SERVICES | Facility: HOSPITAL | Age: 43
LOS: 1 days | End: 2023-04-07
Payer: COMMERCIAL

## 2023-04-07 DIAGNOSIS — Z79.01 LONG TERM (CURRENT) USE OF ANTICOAGULANTS: ICD-10-CM

## 2023-04-07 PROCEDURE — 99211 OFF/OP EST MAY X REQ PHY/QHP: CPT | Mod: 95

## 2023-04-08 DIAGNOSIS — Z79.01 LONG TERM (CURRENT) USE OF ANTICOAGULANTS: ICD-10-CM

## 2023-04-10 DIAGNOSIS — I23.6 THROMBOSIS OF ATRIUM, AURICULAR APPENDAGE, AND VENTRICLE AS CURRENT COMPLICATIONS FOLLOWING ACUTE MYOCARDIAL INFARCTION: ICD-10-CM

## 2023-04-17 ENCOUNTER — APPOINTMENT (OUTPATIENT)
Dept: MEDICATION MANAGEMENT | Facility: CLINIC | Age: 43
End: 2023-04-17

## 2023-04-22 ENCOUNTER — LABORATORY RESULT (OUTPATIENT)
Age: 43
End: 2023-04-22

## 2023-04-28 ENCOUNTER — APPOINTMENT (OUTPATIENT)
Dept: MEDICATION MANAGEMENT | Facility: CLINIC | Age: 43
End: 2023-04-28

## 2023-04-28 ENCOUNTER — TRANSCRIPTION ENCOUNTER (OUTPATIENT)
Age: 43
End: 2023-04-28

## 2023-04-28 ENCOUNTER — OUTPATIENT (OUTPATIENT)
Dept: OUTPATIENT SERVICES | Facility: HOSPITAL | Age: 43
LOS: 1 days | End: 2023-04-28
Payer: COMMERCIAL

## 2023-04-28 DIAGNOSIS — Z79.01 LONG TERM (CURRENT) USE OF ANTICOAGULANTS: ICD-10-CM

## 2023-04-28 PROCEDURE — 99211 OFF/OP EST MAY X REQ PHY/QHP: CPT | Mod: 95

## 2023-04-29 ENCOUNTER — LABORATORY RESULT (OUTPATIENT)
Age: 43
End: 2023-04-29

## 2023-05-01 DIAGNOSIS — I48.91 UNSPECIFIED ATRIAL FIBRILLATION: ICD-10-CM

## 2023-05-02 ENCOUNTER — APPOINTMENT (OUTPATIENT)
Dept: MEDICATION MANAGEMENT | Facility: CLINIC | Age: 43
End: 2023-05-02

## 2023-05-02 ENCOUNTER — OUTPATIENT (OUTPATIENT)
Dept: OUTPATIENT SERVICES | Facility: HOSPITAL | Age: 43
LOS: 1 days | End: 2023-05-02
Payer: COMMERCIAL

## 2023-05-02 ENCOUNTER — LABORATORY RESULT (OUTPATIENT)
Age: 43
End: 2023-05-02

## 2023-05-02 DIAGNOSIS — Z79.01 LONG TERM (CURRENT) USE OF ANTICOAGULANTS: ICD-10-CM

## 2023-05-02 DIAGNOSIS — I48.91 UNSPECIFIED ATRIAL FIBRILLATION: ICD-10-CM

## 2023-05-02 PROCEDURE — 99211 OFF/OP EST MAY X REQ PHY/QHP: CPT | Mod: 95

## 2023-05-03 DIAGNOSIS — I23.6 THROMBOSIS OF ATRIUM, AURICULAR APPENDAGE, AND VENTRICLE AS CURRENT COMPLICATIONS FOLLOWING ACUTE MYOCARDIAL INFARCTION: ICD-10-CM

## 2023-05-04 DIAGNOSIS — I23.6 THROMBOSIS OF ATRIUM, AURICULAR APPENDAGE, AND VENTRICLE AS CURRENT COMPLICATIONS FOLLOWING ACUTE MYOCARDIAL INFARCTION: ICD-10-CM

## 2023-05-04 DIAGNOSIS — Z79.01 LONG TERM (CURRENT) USE OF ANTICOAGULANTS: ICD-10-CM

## 2023-05-06 ENCOUNTER — LABORATORY RESULT (OUTPATIENT)
Age: 43
End: 2023-05-06

## 2023-05-08 ENCOUNTER — APPOINTMENT (OUTPATIENT)
Dept: MEDICATION MANAGEMENT | Facility: CLINIC | Age: 43
End: 2023-05-08

## 2023-05-08 ENCOUNTER — OUTPATIENT (OUTPATIENT)
Dept: OUTPATIENT SERVICES | Facility: HOSPITAL | Age: 43
LOS: 1 days | End: 2023-05-08
Payer: COMMERCIAL

## 2023-05-08 DIAGNOSIS — Z79.01 LONG TERM (CURRENT) USE OF ANTICOAGULANTS: ICD-10-CM

## 2023-05-08 PROCEDURE — 99211 OFF/OP EST MAY X REQ PHY/QHP: CPT | Mod: 95

## 2023-05-09 DIAGNOSIS — I23.6 THROMBOSIS OF ATRIUM, AURICULAR APPENDAGE, AND VENTRICLE AS CURRENT COMPLICATIONS FOLLOWING ACUTE MYOCARDIAL INFARCTION: ICD-10-CM

## 2023-05-10 DIAGNOSIS — I23.6 THROMBOSIS OF ATRIUM, AURICULAR APPENDAGE, AND VENTRICLE AS CURRENT COMPLICATIONS FOLLOWING ACUTE MYOCARDIAL INFARCTION: ICD-10-CM

## 2023-05-10 DIAGNOSIS — Z79.01 LONG TERM (CURRENT) USE OF ANTICOAGULANTS: ICD-10-CM

## 2023-05-13 ENCOUNTER — LABORATORY RESULT (OUTPATIENT)
Age: 43
End: 2023-05-13

## 2023-05-15 ENCOUNTER — OUTPATIENT (OUTPATIENT)
Dept: OUTPATIENT SERVICES | Facility: HOSPITAL | Age: 43
LOS: 1 days | End: 2023-05-15
Payer: COMMERCIAL

## 2023-05-15 ENCOUNTER — APPOINTMENT (OUTPATIENT)
Dept: MEDICATION MANAGEMENT | Facility: CLINIC | Age: 43
End: 2023-05-15

## 2023-05-15 DIAGNOSIS — Z79.01 LONG TERM (CURRENT) USE OF ANTICOAGULANTS: ICD-10-CM

## 2023-05-15 PROCEDURE — G0463: CPT

## 2023-05-16 DIAGNOSIS — I23.6 THROMBOSIS OF ATRIUM, AURICULAR APPENDAGE, AND VENTRICLE AS CURRENT COMPLICATIONS FOLLOWING ACUTE MYOCARDIAL INFARCTION: ICD-10-CM

## 2023-05-17 DIAGNOSIS — Z79.01 LONG TERM (CURRENT) USE OF ANTICOAGULANTS: ICD-10-CM

## 2023-05-17 DIAGNOSIS — I23.6 THROMBOSIS OF ATRIUM, AURICULAR APPENDAGE, AND VENTRICLE AS CURRENT COMPLICATIONS FOLLOWING ACUTE MYOCARDIAL INFARCTION: ICD-10-CM

## 2023-05-19 ENCOUNTER — APPOINTMENT (OUTPATIENT)
Dept: CARDIOLOGY | Facility: CLINIC | Age: 43
End: 2023-05-19
Payer: COMMERCIAL

## 2023-05-19 ENCOUNTER — NON-APPOINTMENT (OUTPATIENT)
Age: 43
End: 2023-05-19

## 2023-05-19 ENCOUNTER — APPOINTMENT (OUTPATIENT)
Dept: CV DIAGNOSITCS | Facility: HOSPITAL | Age: 43
End: 2023-05-19

## 2023-05-19 ENCOUNTER — OUTPATIENT (OUTPATIENT)
Dept: OUTPATIENT SERVICES | Facility: HOSPITAL | Age: 43
LOS: 1 days | End: 2023-05-19
Payer: COMMERCIAL

## 2023-05-19 VITALS
SYSTOLIC BLOOD PRESSURE: 117 MMHG | HEART RATE: 79 BPM | WEIGHT: 118 LBS | HEIGHT: 70 IN | BODY MASS INDEX: 16.89 KG/M2 | DIASTOLIC BLOOD PRESSURE: 82 MMHG | OXYGEN SATURATION: 98 %

## 2023-05-19 DIAGNOSIS — I50.22 CHRONIC SYSTOLIC (CONGESTIVE) HEART FAILURE: ICD-10-CM

## 2023-05-19 PROCEDURE — 99214 OFFICE O/P EST MOD 30 MIN: CPT

## 2023-05-19 PROCEDURE — 93000 ELECTROCARDIOGRAM COMPLETE: CPT

## 2023-05-19 PROCEDURE — 93306 TTE W/DOPPLER COMPLETE: CPT | Mod: 26

## 2023-05-19 RX ORDER — PANTOPRAZOLE 40 MG/1
40 TABLET, DELAYED RELEASE ORAL DAILY
Qty: 90 | Refills: 0 | Status: ACTIVE | COMMUNITY
Start: 2023-02-14 | End: 1900-01-01

## 2023-05-19 NOTE — DISCUSSION/SUMMARY
[EKG obtained to assist in diagnosis and management of assessed problem(s)] : EKG obtained to assist in diagnosis and management of assessed problem(s) [FreeTextEntry1] : 43 year old man with history of DM HTN HLD with recent AWMI and PCI to LAD and POBA to Daig\par \par #CAD- AWMI with PCI to pLAD and POBA to diag\par On Plavix and Coumadin\par \par #HFrEF- Patient currently on Toprol and Entresto- he is only taking once a day but will start taking twice a day. In addition he has not taken Toprol in a few months because he didn't know to  the refill\par Check TTE- had today and will followup for EF and LV thrombus\par \par #LV thrombus- On coumadin , check INR today and recheck next week\par \par #HLD- does not have lipitor- will add atorvastatin back at 40 mg daily

## 2023-05-19 NOTE — HISTORY OF PRESENT ILLNESS
[FreeTextEntry1] : 43 year old man with admission to [facility] from 12/11-12/19/22. Off of Losartan but has been taking Metop, \par Had AWMI with PCI to pLAD and POBA to diag\par TTE thereafter showed HFrEF with EF 30% and LV thrombus\par \par #CAD- AWMI with PCI to pLAD and POBA to diag\par On Plavix and Coumadin\par \par #HFrEF- Patient currently on Toprol and Entresto- he is only taking once a day but will start taking twice a day. In addition he has not taken Toprol in a few months because he didn't know to  the refill\par Check TTE- had today and will followup for EF and LV thrombus\par \par #LV thrombus- On coumadin , check INR today and recheck next week\par \par #HLD- does not have lipitor- will add atorvastatin back at 40 mg daily

## 2023-05-20 ENCOUNTER — LABORATORY RESULT (OUTPATIENT)
Age: 43
End: 2023-05-20

## 2023-05-22 ENCOUNTER — OUTPATIENT (OUTPATIENT)
Dept: OUTPATIENT SERVICES | Facility: HOSPITAL | Age: 43
LOS: 1 days | End: 2023-05-22
Payer: COMMERCIAL

## 2023-05-22 ENCOUNTER — APPOINTMENT (OUTPATIENT)
Dept: MEDICATION MANAGEMENT | Facility: CLINIC | Age: 43
End: 2023-05-22

## 2023-05-22 DIAGNOSIS — Z79.01 LONG TERM (CURRENT) USE OF ANTICOAGULANTS: ICD-10-CM

## 2023-05-22 PROCEDURE — G0463: CPT

## 2023-05-23 DIAGNOSIS — I23.6 THROMBOSIS OF ATRIUM, AURICULAR APPENDAGE, AND VENTRICLE AS CURRENT COMPLICATIONS FOLLOWING ACUTE MYOCARDIAL INFARCTION: ICD-10-CM

## 2023-05-24 DIAGNOSIS — Z79.01 LONG TERM (CURRENT) USE OF ANTICOAGULANTS: ICD-10-CM

## 2023-05-24 DIAGNOSIS — I23.6 THROMBOSIS OF ATRIUM, AURICULAR APPENDAGE, AND VENTRICLE AS CURRENT COMPLICATIONS FOLLOWING ACUTE MYOCARDIAL INFARCTION: ICD-10-CM

## 2023-07-05 ENCOUNTER — APPOINTMENT (OUTPATIENT)
Dept: ENDOCRINOLOGY | Facility: CLINIC | Age: 43
End: 2023-07-05

## 2023-07-15 NOTE — ED CDU PROVIDER INITIAL DAY NOTE - PSYCHIATRIC, MLM
Report to EMS   Alert and oriented to person, place, time/situation. normal mood and affect. no apparent risk to self or others.

## 2023-08-10 RX ORDER — INSULIN GLARGINE-YFGN 100 [IU]/ML
100 INJECTION, SOLUTION SUBCUTANEOUS
Qty: 5 | Refills: 5 | Status: ACTIVE | COMMUNITY
Start: 1900-01-01 | End: 1900-01-01

## 2023-08-25 ENCOUNTER — NON-APPOINTMENT (OUTPATIENT)
Age: 43
End: 2023-08-25

## 2023-08-25 ENCOUNTER — APPOINTMENT (OUTPATIENT)
Dept: CARDIOLOGY | Facility: CLINIC | Age: 43
End: 2023-08-25
Payer: COMMERCIAL

## 2023-08-25 VITALS
OXYGEN SATURATION: 100 % | HEART RATE: 97 BPM | SYSTOLIC BLOOD PRESSURE: 130 MMHG | DIASTOLIC BLOOD PRESSURE: 93 MMHG | HEIGHT: 70 IN | BODY MASS INDEX: 16.89 KG/M2 | WEIGHT: 118 LBS

## 2023-08-25 PROCEDURE — 93000 ELECTROCARDIOGRAM COMPLETE: CPT

## 2023-08-25 PROCEDURE — 99214 OFFICE O/P EST MOD 30 MIN: CPT

## 2023-08-25 RX ORDER — WARFARIN 5 MG/1
5 TABLET ORAL
Qty: 90 | Refills: 2 | Status: DISCONTINUED | COMMUNITY
End: 2023-08-25

## 2023-08-25 RX ORDER — WARFARIN 5 MG/1
5 TABLET ORAL
Qty: 60 | Refills: 2 | Status: DISCONTINUED | COMMUNITY
Start: 2023-04-14 | End: 2023-08-25

## 2023-08-25 RX ORDER — ENOXAPARIN SODIUM 80 MG/.8ML
80 INJECTION, SOLUTION SUBCUTANEOUS
Qty: 10 | Refills: 0 | Status: DISCONTINUED | COMMUNITY
Start: 2023-01-13 | End: 2023-08-25

## 2023-08-25 NOTE — DISCUSSION/SUMMARY
[FreeTextEntry1] : 43 year old man with history of DM HTN HLD with recent AWMI and PCI to LAD and POBA to Daig  #CAD- AWMI with PCI to pLAD and POBA to diag On Plavix but states he has not taken in quite some time Coumadin stopped in May bc no LV thormbus WIll now recheck TTE  #HFrEF- Patient currently on Toprol and Entresto- he is now taking his medications Check TTE- will followup for EF and LV thrombus  #HLD-  atorvastatin back at 40 mg daily [EKG obtained to assist in diagnosis and management of assessed problem(s)] : EKG obtained to assist in diagnosis and management of assessed problem(s)

## 2023-08-25 NOTE — HISTORY OF PRESENT ILLNESS
[FreeTextEntry1] : 43 year old man with admission to [facility] from 12/11-12/19/22. Off of Losartan but has been taking Metop,  Had AWMI with PCI to pLAD and POBA to diag TTE thereafter showed HFrEF with EF 30% and LV thrombus  #CAD- AWMI with PCI to pLAD and POBA to diag On Plavix but states he has not taken in quite some time Coumadin stopped in May bc no LV thormbus WIll now recheck TTE  #HFrEF- Patient currently on Toprol and Entresto- he is now taking his medications Check TTE- will followup for EF and LV thrombus  #HLD-  atorvastatin back at 40 mg daily

## 2023-11-17 ENCOUNTER — APPOINTMENT (OUTPATIENT)
Dept: CARDIOLOGY | Facility: CLINIC | Age: 43
End: 2023-11-17
Payer: COMMERCIAL

## 2023-11-17 ENCOUNTER — OUTPATIENT (OUTPATIENT)
Dept: OUTPATIENT SERVICES | Facility: HOSPITAL | Age: 43
LOS: 1 days | End: 2023-11-17
Payer: COMMERCIAL

## 2023-11-17 ENCOUNTER — APPOINTMENT (OUTPATIENT)
Dept: CV DIAGNOSITCS | Facility: HOSPITAL | Age: 43
End: 2023-11-17

## 2023-11-17 ENCOUNTER — NON-APPOINTMENT (OUTPATIENT)
Age: 43
End: 2023-11-17

## 2023-11-17 VITALS
WEIGHT: 118 LBS | HEIGHT: 70 IN | SYSTOLIC BLOOD PRESSURE: 122 MMHG | DIASTOLIC BLOOD PRESSURE: 81 MMHG | OXYGEN SATURATION: 97 % | BODY MASS INDEX: 16.89 KG/M2 | TEMPERATURE: 98 F | HEART RATE: 112 BPM

## 2023-11-17 DIAGNOSIS — I21.02 ST ELEVATION (STEMI) MYOCARDIAL INFARCTION INVOLVING LEFT ANTERIOR DESCENDING CORONARY ARTERY: ICD-10-CM

## 2023-11-17 DIAGNOSIS — I50.22 CHRONIC SYSTOLIC (CONGESTIVE) HEART FAILURE: ICD-10-CM

## 2023-11-17 DIAGNOSIS — I25.10 ATHEROSCLEROTIC HEART DISEASE OF NATIVE CORONARY ARTERY WITHOUT ANGINA PECTORIS: ICD-10-CM

## 2023-11-17 PROCEDURE — 93000 ELECTROCARDIOGRAM COMPLETE: CPT

## 2023-11-17 PROCEDURE — 93306 TTE W/DOPPLER COMPLETE: CPT | Mod: 26

## 2023-11-17 PROCEDURE — 99215 OFFICE O/P EST HI 40 MIN: CPT

## 2023-11-21 ENCOUNTER — APPOINTMENT (OUTPATIENT)
Dept: MEDICATION MANAGEMENT | Facility: CLINIC | Age: 43
End: 2023-11-21

## 2023-11-21 ENCOUNTER — OUTPATIENT (OUTPATIENT)
Dept: OUTPATIENT SERVICES | Facility: HOSPITAL | Age: 43
LOS: 1 days | End: 2023-11-21
Payer: COMMERCIAL

## 2023-11-21 DIAGNOSIS — Z79.01 LONG TERM (CURRENT) USE OF ANTICOAGULANTS: ICD-10-CM

## 2023-11-21 PROCEDURE — G0463: CPT

## 2023-11-22 DIAGNOSIS — I23.6 THROMBOSIS OF ATRIUM, AURICULAR APPENDAGE, AND VENTRICLE AS CURRENT COMPLICATIONS FOLLOWING ACUTE MYOCARDIAL INFARCTION: ICD-10-CM

## 2023-11-22 LAB
INR PPP: 3.27 RATIO
PT BLD: 36 SEC

## 2023-11-23 DIAGNOSIS — I23.6 THROMBOSIS OF ATRIUM, AURICULAR APPENDAGE, AND VENTRICLE AS CURRENT COMPLICATIONS FOLLOWING ACUTE MYOCARDIAL INFARCTION: ICD-10-CM

## 2023-11-23 DIAGNOSIS — Z79.01 LONG TERM (CURRENT) USE OF ANTICOAGULANTS: ICD-10-CM

## 2023-11-27 ENCOUNTER — LABORATORY RESULT (OUTPATIENT)
Age: 43
End: 2023-11-27

## 2023-11-27 ENCOUNTER — OUTPATIENT (OUTPATIENT)
Dept: OUTPATIENT SERVICES | Facility: HOSPITAL | Age: 43
LOS: 1 days | End: 2023-11-27
Payer: COMMERCIAL

## 2023-11-27 ENCOUNTER — APPOINTMENT (OUTPATIENT)
Dept: MEDICATION MANAGEMENT | Facility: CLINIC | Age: 43
End: 2023-11-27

## 2023-11-27 PROCEDURE — G0463: CPT

## 2023-11-29 ENCOUNTER — OUTPATIENT (OUTPATIENT)
Dept: OUTPATIENT SERVICES | Facility: HOSPITAL | Age: 43
LOS: 1 days | End: 2023-11-29
Payer: COMMERCIAL

## 2023-11-29 ENCOUNTER — APPOINTMENT (OUTPATIENT)
Dept: MEDICATION MANAGEMENT | Facility: CLINIC | Age: 43
End: 2023-11-29

## 2023-11-29 ENCOUNTER — LABORATORY RESULT (OUTPATIENT)
Age: 43
End: 2023-11-29

## 2023-11-29 DIAGNOSIS — I23.6 THROMBOSIS OF ATRIUM, AURICULAR APPENDAGE, AND VENTRICLE AS CURRENT COMPLICATIONS FOLLOWING ACUTE MYOCARDIAL INFARCTION: ICD-10-CM

## 2023-11-29 DIAGNOSIS — Z79.01 LONG TERM (CURRENT) USE OF ANTICOAGULANTS: ICD-10-CM

## 2023-11-29 PROCEDURE — G0463: CPT

## 2023-11-30 DIAGNOSIS — I23.6 THROMBOSIS OF ATRIUM, AURICULAR APPENDAGE, AND VENTRICLE AS CURRENT COMPLICATIONS FOLLOWING ACUTE MYOCARDIAL INFARCTION: ICD-10-CM

## 2023-11-30 DIAGNOSIS — Z79.01 LONG TERM (CURRENT) USE OF ANTICOAGULANTS: ICD-10-CM

## 2023-12-01 ENCOUNTER — OUTPATIENT (OUTPATIENT)
Dept: OUTPATIENT SERVICES | Facility: HOSPITAL | Age: 43
LOS: 1 days | End: 2023-12-01
Payer: COMMERCIAL

## 2023-12-01 ENCOUNTER — APPOINTMENT (OUTPATIENT)
Dept: MEDICATION MANAGEMENT | Facility: CLINIC | Age: 43
End: 2023-12-01

## 2023-12-01 DIAGNOSIS — I23.6 THROMBOSIS OF ATRIUM, AURICULAR APPENDAGE, AND VENTRICLE AS CURRENT COMPLICATIONS FOLLOWING ACUTE MYOCARDIAL INFARCTION: ICD-10-CM

## 2023-12-01 DIAGNOSIS — Z79.01 LONG TERM (CURRENT) USE OF ANTICOAGULANTS: ICD-10-CM

## 2023-12-01 LAB
INR PPP: 3.6
PT BLD: 42.6

## 2023-12-01 PROCEDURE — G0463: CPT

## 2023-12-04 DIAGNOSIS — I23.6 THROMBOSIS OF ATRIUM, AURICULAR APPENDAGE, AND VENTRICLE AS CURRENT COMPLICATIONS FOLLOWING ACUTE MYOCARDIAL INFARCTION: ICD-10-CM

## 2023-12-05 ENCOUNTER — OUTPATIENT (OUTPATIENT)
Dept: OUTPATIENT SERVICES | Facility: HOSPITAL | Age: 43
LOS: 1 days | End: 2023-12-05
Payer: COMMERCIAL

## 2023-12-05 ENCOUNTER — APPOINTMENT (OUTPATIENT)
Dept: MEDICATION MANAGEMENT | Facility: CLINIC | Age: 43
End: 2023-12-05

## 2023-12-05 DIAGNOSIS — Z79.01 LONG TERM (CURRENT) USE OF ANTICOAGULANTS: ICD-10-CM

## 2023-12-05 DIAGNOSIS — I23.6 THROMBOSIS OF ATRIUM, AURICULAR APPENDAGE, AND VENTRICLE AS CURRENT COMPLICATIONS FOLLOWING ACUTE MYOCARDIAL INFARCTION: ICD-10-CM

## 2023-12-05 LAB
INR PPP: 1.6
PT BLD: 18.8

## 2023-12-05 PROCEDURE — G0463: CPT

## 2023-12-06 DIAGNOSIS — I23.6 THROMBOSIS OF ATRIUM, AURICULAR APPENDAGE, AND VENTRICLE AS CURRENT COMPLICATIONS FOLLOWING ACUTE MYOCARDIAL INFARCTION: ICD-10-CM

## 2023-12-07 DIAGNOSIS — Z79.01 LONG TERM (CURRENT) USE OF ANTICOAGULANTS: ICD-10-CM

## 2023-12-07 DIAGNOSIS — I23.6 THROMBOSIS OF ATRIUM, AURICULAR APPENDAGE, AND VENTRICLE AS CURRENT COMPLICATIONS FOLLOWING ACUTE MYOCARDIAL INFARCTION: ICD-10-CM

## 2023-12-09 LAB
INR PPP: 1.2
PT BLD: 14

## 2023-12-15 ENCOUNTER — APPOINTMENT (OUTPATIENT)
Dept: MEDICATION MANAGEMENT | Facility: CLINIC | Age: 43
End: 2023-12-15

## 2023-12-15 ENCOUNTER — OUTPATIENT (OUTPATIENT)
Dept: OUTPATIENT SERVICES | Facility: HOSPITAL | Age: 43
LOS: 1 days | End: 2023-12-15
Payer: COMMERCIAL

## 2023-12-15 DIAGNOSIS — Z79.01 LONG TERM (CURRENT) USE OF ANTICOAGULANTS: ICD-10-CM

## 2023-12-15 LAB
INR PPP: 4.6
PT BLD: 55.7

## 2023-12-15 PROCEDURE — G0463: CPT

## 2023-12-18 NOTE — DISCHARGE NOTE NURSING/CASE MANAGEMENT/SOCIAL WORK - NURSING SECTION COMPLETE
"  Virtual Regular Visit    Verification of patient location:    Patient is located at Other in the following state in which I hold an active license [place]      Assessment/Plan:    Problem List Items Addressed This Visit    None      Goals addressed in session: Goal 1          Reason for visit is   Chief Complaint   Patient presents with    Medication Management        Encounter provider Shannon Young MD    Provider located at [facility]  [address] [facility] [id number] [phone]      Recent Visits  No visits were found meeting these conditions.  Showing recent visits within past 7 days and meeting all other requirements  Today's Visits  Date Type Provider Dept   12/18/23 Telemedicine Shannon Young MD [facility]   Showing today's visits and meeting all other requirements  Future Appointments  No visits were found meeting these conditions.  Showing future appointments within next 150 days and meeting all other requirements       The patient was identified by name and date of birth. Brenda Griffin was informed that this is a telemedicine visit and that the visit is being conducted throughthe Epic Embedded platform. She agrees to proceed..  My office door was closed. No one else was in the room.  She acknowledged consent and understanding of privacy and security of the video platform. The patient has agreed to participate and understands they can discontinue the visit at any time.    Patient is aware this is a billable service.     Subjective  Brenda Griffin is a 26 y.o. female with anxiety presents for regular f/u  .  Compliant with meds, denies SE  Works in  - stressed lately  Social with friends, but likes to stay at home  Denies acute medical problems     HPI   Anxiety - in social situations gets \" uncomfortable\", jittery sometimes - avoiding behavior sometimes  Episodes of negative intrusive thoughts , which make her feeling " depressed  Mood - gets depressed sometimes, low self esteem, body image concerns , low motivation- uses coping skills; denies SIB  History reviewed. No pertinent past medical history.    History reviewed. No pertinent surgical history.    Current Outpatient Medications   Medication Sig Dispense Refill    Fluvoxamine Maleate 150 MG CP24 Take 1 capsule (150 mg total) by mouth daily at bedtime 90 capsule 1     No current facility-administered medications for this visit.        No Known Allergies    Review of Systems   Constitutional:  Negative for activity change and appetite change.   Psychiatric/Behavioral:  Negative for dysphoric mood, sleep disturbance and suicidal ideas. The patient is not nervous/anxious.        Video Exam    There were no vitals filed for this visit.    Physical Exam  Constitutional:       Appearance: Normal appearance. She is normal weight.   Neurological:      Mental Status: She is alert.   Psychiatric:         Attention and Perception: Attention and perception normal.         Mood and Affect: Mood and affect normal.         Speech: Speech normal.         Behavior: Behavior normal. Behavior is cooperative.         Thought Content: Thought content normal.          Visit Time    Visit Start Time: 3.56 pm   Visit Stop Time: 4.07 pm   Total Visit Duration:  25 minutes    TREATMENT PLAN (Medication Management Only)        [facility]    Name and Date of Birth:  Brenda Griffin 26 y.o. 1997  Date of Treatment Plan: December 18, 2023  Diagnosis/Diagnoses:  No diagnosis found.  Strengths/Personal Resources for Self-Care: taking medications as prescribed.  Area/Areas of need (in own words): anxiety  1. Long Term Goal: maintain control of anxiety.  Target Date:6 months - 6/18/2024  Person/Persons responsible for completion of goal: Brenda  2.  Short Term Objective (s) - How will we reach this goal?:   A. Provider new recommended medication/dosage  changes and/or continue medication(s): continue current medications as prescribed Luvox.  B. N/A.  C. N/A.  Target Date:6 months - 6/18/2024  Person/Persons Responsible for Completion of Goal: Brenda  Progress Towards Goals: stable  Treatment Modality: medication management every 6 months  Review due 180 days from date of this plan: 6 months - 6/18/2024  Expected length of service: ongoing treatment  My Physician/PA/NP and I have developed this plan together and I agree to work on the goals and objectives. I understand the treatment goals that were developed for my treatment.         Patient/Caregiver provided printed discharge information.

## 2023-12-22 ENCOUNTER — OUTPATIENT (OUTPATIENT)
Dept: OUTPATIENT SERVICES | Facility: HOSPITAL | Age: 43
LOS: 1 days | End: 2023-12-22
Payer: COMMERCIAL

## 2023-12-22 ENCOUNTER — APPOINTMENT (OUTPATIENT)
Dept: MEDICATION MANAGEMENT | Facility: CLINIC | Age: 43
End: 2023-12-22

## 2023-12-22 DIAGNOSIS — Z79.01 LONG TERM (CURRENT) USE OF ANTICOAGULANTS: ICD-10-CM

## 2023-12-22 DIAGNOSIS — I23.6 THROMBOSIS OF ATRIUM, AURICULAR APPENDAGE, AND VENTRICLE AS CURRENT COMPLICATIONS FOLLOWING ACUTE MYOCARDIAL INFARCTION: ICD-10-CM

## 2023-12-22 LAB
INR PPP: 1.7
PT BLD: 20.2

## 2023-12-22 PROCEDURE — G0463: CPT

## 2023-12-23 DIAGNOSIS — Z79.01 LONG TERM (CURRENT) USE OF ANTICOAGULANTS: ICD-10-CM

## 2023-12-23 DIAGNOSIS — I23.6 THROMBOSIS OF ATRIUM, AURICULAR APPENDAGE, AND VENTRICLE AS CURRENT COMPLICATIONS FOLLOWING ACUTE MYOCARDIAL INFARCTION: ICD-10-CM

## 2023-12-27 DIAGNOSIS — I23.6 THROMBOSIS OF ATRIUM, AURICULAR APPENDAGE, AND VENTRICLE AS CURRENT COMPLICATIONS FOLLOWING ACUTE MYOCARDIAL INFARCTION: ICD-10-CM

## 2023-12-29 ENCOUNTER — APPOINTMENT (OUTPATIENT)
Dept: MEDICATION MANAGEMENT | Facility: CLINIC | Age: 43
End: 2023-12-29

## 2023-12-29 ENCOUNTER — OUTPATIENT (OUTPATIENT)
Dept: OUTPATIENT SERVICES | Facility: HOSPITAL | Age: 43
LOS: 1 days | End: 2023-12-29
Payer: COMMERCIAL

## 2023-12-29 DIAGNOSIS — Z79.01 LONG TERM (CURRENT) USE OF ANTICOAGULANTS: ICD-10-CM

## 2023-12-29 LAB
INR PPP: 1.3
PT BLD: 15

## 2023-12-29 PROCEDURE — G0463: CPT

## 2024-01-03 ENCOUNTER — APPOINTMENT (OUTPATIENT)
Dept: MEDICATION MANAGEMENT | Facility: CLINIC | Age: 44
End: 2024-01-03

## 2024-01-04 ENCOUNTER — OUTPATIENT (OUTPATIENT)
Dept: OUTPATIENT SERVICES | Facility: HOSPITAL | Age: 44
LOS: 1 days | End: 2024-01-04
Payer: COMMERCIAL

## 2024-01-04 ENCOUNTER — APPOINTMENT (OUTPATIENT)
Dept: MEDICATION MANAGEMENT | Facility: CLINIC | Age: 44
End: 2024-01-04

## 2024-01-04 DIAGNOSIS — I23.6 THROMBOSIS OF ATRIUM, AURICULAR APPENDAGE, AND VENTRICLE AS CURRENT COMPLICATIONS FOLLOWING ACUTE MYOCARDIAL INFARCTION: ICD-10-CM

## 2024-01-04 DIAGNOSIS — Z79.01 LONG TERM (CURRENT) USE OF ANTICOAGULANTS: ICD-10-CM

## 2024-01-04 LAB
PROTHROMBIN TIME AND INR, PLASMA: >8
PT BLD: >96

## 2024-01-04 PROCEDURE — G0463: CPT

## 2024-01-05 DIAGNOSIS — I23.6 THROMBOSIS OF ATRIUM, AURICULAR APPENDAGE, AND VENTRICLE AS CURRENT COMPLICATIONS FOLLOWING ACUTE MYOCARDIAL INFARCTION: ICD-10-CM

## 2024-01-05 DIAGNOSIS — Z79.01 LONG TERM (CURRENT) USE OF ANTICOAGULANTS: ICD-10-CM

## 2024-01-06 ENCOUNTER — OUTPATIENT (OUTPATIENT)
Dept: OUTPATIENT SERVICES | Facility: HOSPITAL | Age: 44
LOS: 1 days | End: 2024-01-06
Payer: COMMERCIAL

## 2024-01-06 ENCOUNTER — APPOINTMENT (OUTPATIENT)
Dept: MEDICATION MANAGEMENT | Facility: CLINIC | Age: 44
End: 2024-01-06

## 2024-01-06 DIAGNOSIS — Z79.01 LONG TERM (CURRENT) USE OF ANTICOAGULANTS: ICD-10-CM

## 2024-01-06 LAB
INR PPP: >8
PT BLD: >96

## 2024-01-06 PROCEDURE — G0463: CPT

## 2024-01-08 ENCOUNTER — APPOINTMENT (OUTPATIENT)
Dept: MEDICATION MANAGEMENT | Facility: CLINIC | Age: 44
End: 2024-01-08

## 2024-01-08 ENCOUNTER — LABORATORY RESULT (OUTPATIENT)
Age: 44
End: 2024-01-08

## 2024-01-09 DIAGNOSIS — I23.6 THROMBOSIS OF ATRIUM, AURICULAR APPENDAGE, AND VENTRICLE AS CURRENT COMPLICATIONS FOLLOWING ACUTE MYOCARDIAL INFARCTION: ICD-10-CM

## 2024-01-10 DIAGNOSIS — Z79.01 LONG TERM (CURRENT) USE OF ANTICOAGULANTS: ICD-10-CM

## 2024-01-10 DIAGNOSIS — I23.6 THROMBOSIS OF ATRIUM, AURICULAR APPENDAGE, AND VENTRICLE AS CURRENT COMPLICATIONS FOLLOWING ACUTE MYOCARDIAL INFARCTION: ICD-10-CM

## 2024-01-11 ENCOUNTER — LABORATORY RESULT (OUTPATIENT)
Age: 44
End: 2024-01-11

## 2024-01-11 DIAGNOSIS — I23.6 THROMBOSIS OF ATRIUM, AURICULAR APPENDAGE, AND VENTRICLE AS CURRENT COMPLICATIONS FOLLOWING ACUTE MYOCARDIAL INFARCTION: ICD-10-CM

## 2024-01-12 ENCOUNTER — OUTPATIENT (OUTPATIENT)
Dept: OUTPATIENT SERVICES | Facility: HOSPITAL | Age: 44
LOS: 1 days | End: 2024-01-12
Payer: COMMERCIAL

## 2024-01-12 ENCOUNTER — NON-APPOINTMENT (OUTPATIENT)
Age: 44
End: 2024-01-12

## 2024-01-12 ENCOUNTER — APPOINTMENT (OUTPATIENT)
Dept: CARDIOLOGY | Facility: CLINIC | Age: 44
End: 2024-01-12
Payer: COMMERCIAL

## 2024-01-12 ENCOUNTER — APPOINTMENT (OUTPATIENT)
Dept: MEDICATION MANAGEMENT | Facility: CLINIC | Age: 44
End: 2024-01-12

## 2024-01-12 VITALS
OXYGEN SATURATION: 96 % | SYSTOLIC BLOOD PRESSURE: 142 MMHG | WEIGHT: 118 LBS | HEIGHT: 70 IN | DIASTOLIC BLOOD PRESSURE: 93 MMHG | HEART RATE: 104 BPM | TEMPERATURE: 98 F | BODY MASS INDEX: 16.89 KG/M2

## 2024-01-12 DIAGNOSIS — I23.6 THROMBOSIS OF ATRIUM, AURICULAR APPENDAGE, AND VENTRICLE AS CURRENT COMPLICATIONS FOLLOWING ACUTE MYOCARDIAL INFARCTION: ICD-10-CM

## 2024-01-12 DIAGNOSIS — Z79.01 LONG TERM (CURRENT) USE OF ANTICOAGULANTS: ICD-10-CM

## 2024-01-12 PROCEDURE — 93000 ELECTROCARDIOGRAM COMPLETE: CPT

## 2024-01-12 PROCEDURE — G0463: CPT

## 2024-01-12 PROCEDURE — 99214 OFFICE O/P EST MOD 30 MIN: CPT

## 2024-01-12 RX ORDER — WARFARIN 5 MG/1
5 TABLET ORAL
Qty: 90 | Refills: 3 | Status: ACTIVE | COMMUNITY
Start: 2023-11-17 | End: 1900-01-01

## 2024-01-12 RX ORDER — SACUBITRIL AND VALSARTAN 24; 26 MG/1; MG/1
24-26 TABLET, FILM COATED ORAL TWICE DAILY
Qty: 180 | Refills: 3 | Status: ACTIVE | COMMUNITY
Start: 2023-03-24 | End: 1900-01-01

## 2024-01-12 RX ORDER — METOPROLOL SUCCINATE 50 MG/1
50 TABLET, EXTENDED RELEASE ORAL
Qty: 90 | Refills: 3 | Status: ACTIVE | COMMUNITY
Start: 1900-01-01 | End: 1900-01-01

## 2024-01-12 RX ORDER — CLOPIDOGREL BISULFATE 75 MG/1
75 TABLET, FILM COATED ORAL
Qty: 90 | Refills: 3 | Status: ACTIVE | COMMUNITY
Start: 1900-01-01 | End: 1900-01-01

## 2024-01-12 RX ORDER — ATORVASTATIN CALCIUM 40 MG/1
40 TABLET, FILM COATED ORAL DAILY
Qty: 1 | Refills: 3 | Status: ACTIVE | COMMUNITY
Start: 1900-01-01 | End: 1900-01-01

## 2024-01-12 NOTE — DISCUSSION/SUMMARY
[FreeTextEntry1] : 43 year old man with history of DM HTN HLD with recent AWMI and PCI to LAD and POBA to Tatig  #CAD- AWMI with PCI to pLAD and POBA to diag On Plavix but states he has not taken in quite some time Coumadin stopped in May bc no LV thrombus but echo reviewed from today and now with LV thormbus Will resume coumadin with 5 mg daily and check INR on Tuesday11/21 Will refer to coumadin clinic again (was using previously) #HFrEF- Patient currently on Toprol and Entresto- he is now taking his medications TTE now with LV thrombus-back on coumadin Would like to think about ICD and will let me know #HLD-  atorvastatin back at 40 mg daily #FU in 3 months [EKG obtained to assist in diagnosis and management of assessed problem(s)] : EKG obtained to assist in diagnosis and management of assessed problem(s)

## 2024-01-12 NOTE — HISTORY OF PRESENT ILLNESS
[FreeTextEntry1] : 43 year old man with admission to [facility] from 12/11-12/19/22. Off of Losartan but has been taking Metop,  Had AWMI with PCI to pLAD and POBA to diag TTE thereafter showed HFrEF with EF 30% and LV thrombus  #CAD- AWMI with PCI to pLAD and POBA to diag On Plavix but states he has not taken in quite some time Coumadin stopped in May bc no LV thrombus but echo reviewed from today and now with LV thormbus Will resume coumadin with 5 mg daily and check INR on Tuesday11/21 Will refer to coumadin clinic again (was using previously) #HFrEF- Patient currently on Toprol and Entresto- he is now taking his medications TTE now with LV thrombus-back on coumadin Would like to think about ICD and will let me know #HLD-  atorvastatin back at 40 mg daily

## 2024-01-12 NOTE — CARDIOLOGY SUMMARY
Reviewed  Last seen 4/4/23 for annual  Routed to [facility]   Please advise   [de-identified] : SR with anterior wall MI

## 2024-01-13 DIAGNOSIS — I23.6 THROMBOSIS OF ATRIUM, AURICULAR APPENDAGE, AND VENTRICLE AS CURRENT COMPLICATIONS FOLLOWING ACUTE MYOCARDIAL INFARCTION: ICD-10-CM

## 2024-01-13 DIAGNOSIS — Z79.01 LONG TERM (CURRENT) USE OF ANTICOAGULANTS: ICD-10-CM

## 2024-01-18 ENCOUNTER — LABORATORY RESULT (OUTPATIENT)
Age: 44
End: 2024-01-18

## 2024-01-19 ENCOUNTER — OUTPATIENT (OUTPATIENT)
Dept: OUTPATIENT SERVICES | Facility: HOSPITAL | Age: 44
LOS: 1 days | End: 2024-01-19
Payer: COMMERCIAL

## 2024-01-19 ENCOUNTER — APPOINTMENT (OUTPATIENT)
Dept: ENDOCRINOLOGY | Facility: CLINIC | Age: 44
End: 2024-01-19
Payer: COMMERCIAL

## 2024-01-19 ENCOUNTER — APPOINTMENT (OUTPATIENT)
Dept: MEDICATION MANAGEMENT | Facility: CLINIC | Age: 44
End: 2024-01-19

## 2024-01-19 VITALS
OXYGEN SATURATION: 97 % | BODY MASS INDEX: 16.89 KG/M2 | WEIGHT: 118 LBS | TEMPERATURE: 98.7 F | HEART RATE: 102 BPM | DIASTOLIC BLOOD PRESSURE: 85 MMHG | HEIGHT: 70 IN | SYSTOLIC BLOOD PRESSURE: 121 MMHG

## 2024-01-19 DIAGNOSIS — Z79.01 LONG TERM (CURRENT) USE OF ANTICOAGULANTS: ICD-10-CM

## 2024-01-19 DIAGNOSIS — I23.6 THROMBOSIS OF ATRIUM, AURICULAR APPENDAGE, AND VENTRICLE AS CURRENT COMPLICATIONS FOLLOWING ACUTE MYOCARDIAL INFARCTION: ICD-10-CM

## 2024-01-19 LAB
GLUCOSE BLDC GLUCOMTR-MCNC: 244
HBA1C MFR BLD HPLC: 9.7

## 2024-01-19 PROCEDURE — 82962 GLUCOSE BLOOD TEST: CPT

## 2024-01-19 PROCEDURE — 99214 OFFICE O/P EST MOD 30 MIN: CPT

## 2024-01-19 PROCEDURE — G2211 COMPLEX E/M VISIT ADD ON: CPT

## 2024-01-19 PROCEDURE — G0463: CPT

## 2024-01-19 PROCEDURE — 83036 HEMOGLOBIN GLYCOSYLATED A1C: CPT | Mod: QW

## 2024-01-22 NOTE — HISTORY OF PRESENT ILLNESS
[FreeTextEntry1] : Primary diagnosis: type 2 DM, HLD, HTN Other medical problems: CAD s/p recent STEMI in 2022 with stent, HFrEF  #Diabetes Mellitus Type 2     Diagnosis- 6 years ago  Current regimen: Semglee 20-22 units qhs, MFM 500mg BID, Jardiance 25mg daily  Other diabetic medications discontinued in the past: 1.  Janumet                          Reason for discontinuation: unclear  2. Was on insulin in 2018 prior to kidney stone surgery and then was discontinued  PCP/prior endocrinologist: managed by PCP  Signs/symptoms: Denies  Last HgbA1c: 10.1% (2022) --> 7.7% in 2023--> 9.7% in 2024 Home blood sugars:  Fasting- 100-120 At night- 140-160 Hypoglycemia: Denies  FH of diabetes: mother  History of CAD: yes as noted above History of CVA: Denies    Exercise:  walking   eGFR: 97    Alb/creat: -  Follow with nephrology? Denies  Last eye exam: UTD  Patient saw Dr. Octavio Esteban Evidence of retinopathy? Denies   Last foot exam: UTD Any issues? Denies  Social History:  Alcohol: Denies Tobacco: Quit. former for 20+ years   Work: [employer] and [employer]  #Hyperlipidemia  (2022) LDL: 121 T Currently on statin: Lipitor 40mg daily  Hx of CAD/MI? Yes, STEMI s/p stent  Follows with cardiology? Yes, Dr. Smyth  #Hypertension BP today: 121/85 Current regimen: Metoprolol 50mg daily, losartan 25mg

## 2024-01-22 NOTE — ASSESSMENT
[FreeTextEntry1] : # Type 2 diabetes -Patient's hemoglobin A1c has increased since last visit and now up to 9% -His blood sugars at home do not correlate with his A1c.  Patient states that his sugars at home are always in the 120s in the morning however his A1c indicates a higher average blood glucose -Unable to adjust Semglee given stable blood glucoses at home in the morning Plan: -Advised patient to keep a log of his blood sugars for the next 3 weeks -Patient to come in with CDE in 3 weeks to review blood sugars in order to make further adjustments -Evidence of vitiligo on exam, will screen for Ladha -For now continue with current regimen with the Semglee, metformin, Jardiance  Patient counseled extensively about the complications of diabetes including but not limited to nephropathy, neuropathy, and retinopathy. We discussed the importance of annual foot and optho exams. Explained that ideally blood sugars in the morning prior to breakfast should be between 80 and 130. Blood sugars should be checked 2 hours after eating and should be <180. If blood sugar is <70, patient should treat the blood sugar FIRST and then contact provider. Advised patient to let us know if BG persistently <70 or >200  # Hyperlipidemia - Continue with current regimen  # Hypertension -Continue with current regimen

## 2024-01-23 DIAGNOSIS — I23.6 THROMBOSIS OF ATRIUM, AURICULAR APPENDAGE, AND VENTRICLE AS CURRENT COMPLICATIONS FOLLOWING ACUTE MYOCARDIAL INFARCTION: ICD-10-CM

## 2024-01-25 ENCOUNTER — LABORATORY RESULT (OUTPATIENT)
Age: 44
End: 2024-01-25

## 2024-01-26 ENCOUNTER — APPOINTMENT (OUTPATIENT)
Dept: MEDICATION MANAGEMENT | Facility: CLINIC | Age: 44
End: 2024-01-26

## 2024-01-26 ENCOUNTER — OUTPATIENT (OUTPATIENT)
Dept: OUTPATIENT SERVICES | Facility: HOSPITAL | Age: 44
LOS: 1 days | End: 2024-01-26
Payer: COMMERCIAL

## 2024-01-26 DIAGNOSIS — Z79.01 LONG TERM (CURRENT) USE OF ANTICOAGULANTS: ICD-10-CM

## 2024-01-26 DIAGNOSIS — I23.6 THROMBOSIS OF ATRIUM, AURICULAR APPENDAGE, AND VENTRICLE AS CURRENT COMPLICATIONS FOLLOWING ACUTE MYOCARDIAL INFARCTION: ICD-10-CM

## 2024-01-26 PROCEDURE — G0463: CPT

## 2024-01-29 DIAGNOSIS — I23.6 THROMBOSIS OF ATRIUM, AURICULAR APPENDAGE, AND VENTRICLE AS CURRENT COMPLICATIONS FOLLOWING ACUTE MYOCARDIAL INFARCTION: ICD-10-CM

## 2024-02-01 ENCOUNTER — LABORATORY RESULT (OUTPATIENT)
Age: 44
End: 2024-02-01

## 2024-02-01 RX ORDER — METFORMIN HYDROCHLORIDE 500 MG/1
500 TABLET, COATED ORAL
Qty: 180 | Refills: 2 | Status: ACTIVE | COMMUNITY
Start: 1900-01-01 | End: 1900-01-01

## 2024-02-02 ENCOUNTER — OUTPATIENT (OUTPATIENT)
Dept: OUTPATIENT SERVICES | Facility: HOSPITAL | Age: 44
LOS: 1 days | End: 2024-02-02
Payer: COMMERCIAL

## 2024-02-02 ENCOUNTER — APPOINTMENT (OUTPATIENT)
Dept: MEDICATION MANAGEMENT | Facility: CLINIC | Age: 44
End: 2024-02-02

## 2024-02-02 DIAGNOSIS — Z79.01 LONG TERM (CURRENT) USE OF ANTICOAGULANTS: ICD-10-CM

## 2024-02-02 DIAGNOSIS — I23.6 THROMBOSIS OF ATRIUM, AURICULAR APPENDAGE, AND VENTRICLE AS CURRENT COMPLICATIONS FOLLOWING ACUTE MYOCARDIAL INFARCTION: ICD-10-CM

## 2024-02-02 PROCEDURE — G0463: CPT

## 2024-02-08 ENCOUNTER — LABORATORY RESULT (OUTPATIENT)
Age: 44
End: 2024-02-08

## 2024-02-09 ENCOUNTER — APPOINTMENT (OUTPATIENT)
Dept: MEDICATION MANAGEMENT | Facility: CLINIC | Age: 44
End: 2024-02-09

## 2024-02-15 ENCOUNTER — LABORATORY RESULT (OUTPATIENT)
Age: 44
End: 2024-02-15

## 2024-02-15 LAB
ALBUMIN SERPL ELPH-MCNC: 4.9 G/DL
ALP BLD-CCNC: 51 U/L
ALT SERPL-CCNC: 36 U/L
ANION GAP SERPL CALC-SCNC: 15 MMOL/L
AST SERPL-CCNC: 28 U/L
BILIRUB SERPL-MCNC: 0.8 MG/DL
BUN SERPL-MCNC: 18 MG/DL
CALCIUM SERPL-MCNC: 10.1 MG/DL
CHLORIDE SERPL-SCNC: 97 MMOL/L
CHOLEST SERPL-MCNC: 255 MG/DL
CO2 SERPL-SCNC: 25 MMOL/L
CREAT SERPL-MCNC: 0.96 MG/DL
CREAT SPEC-SCNC: 134 MG/DL
EGFR: 101 ML/MIN/1.73M2
GAD65 AB SER-MCNC: 0.03 NMOL/L
GLUCOSE SERPL-MCNC: 243 MG/DL
HDLC SERPL-MCNC: 65 MG/DL
LDLC SERPL CALC-MCNC: 159 MG/DL
MICROALBUMIN 24H UR DL<=1MG/L-MCNC: 3 MG/DL
MICROALBUMIN/CREAT 24H UR-RTO: 22 MG/G
NONHDLC SERPL-MCNC: 190 MG/DL
PANC ISLET CELL AB SER QL: NORMAL
POTASSIUM SERPL-SCNC: 4.3 MMOL/L
PROT SERPL-MCNC: 8 G/DL
SODIUM SERPL-SCNC: 136 MMOL/L
TRIGL SERPL-MCNC: 174 MG/DL
TSH SERPL-ACNC: 1.86 UIU/ML
ZINC TRANSPORTER 8 AB: <15 U/ML

## 2024-02-15 RX ORDER — ATORVASTATIN CALCIUM 80 MG/1
80 TABLET, FILM COATED ORAL
Qty: 90 | Refills: 3 | Status: ACTIVE | COMMUNITY
Start: 2024-02-15 | End: 1900-01-01

## 2024-02-16 ENCOUNTER — APPOINTMENT (OUTPATIENT)
Dept: MEDICATION MANAGEMENT | Facility: CLINIC | Age: 44
End: 2024-02-16

## 2024-02-16 ENCOUNTER — OUTPATIENT (OUTPATIENT)
Dept: OUTPATIENT SERVICES | Facility: HOSPITAL | Age: 44
LOS: 1 days | End: 2024-02-16
Payer: COMMERCIAL

## 2024-02-16 DIAGNOSIS — Z79.01 LONG TERM (CURRENT) USE OF ANTICOAGULANTS: ICD-10-CM

## 2024-02-16 DIAGNOSIS — I23.6 THROMBOSIS OF ATRIUM, AURICULAR APPENDAGE, AND VENTRICLE AS CURRENT COMPLICATIONS FOLLOWING ACUTE MYOCARDIAL INFARCTION: ICD-10-CM

## 2024-02-16 PROCEDURE — G0463: CPT

## 2024-02-22 ENCOUNTER — LABORATORY RESULT (OUTPATIENT)
Age: 44
End: 2024-02-22

## 2024-02-23 ENCOUNTER — OUTPATIENT (OUTPATIENT)
Dept: OUTPATIENT SERVICES | Facility: HOSPITAL | Age: 44
LOS: 1 days | End: 2024-02-23
Payer: COMMERCIAL

## 2024-02-23 ENCOUNTER — APPOINTMENT (OUTPATIENT)
Dept: MEDICATION MANAGEMENT | Facility: CLINIC | Age: 44
End: 2024-02-23

## 2024-02-23 DIAGNOSIS — I23.6 THROMBOSIS OF ATRIUM, AURICULAR APPENDAGE, AND VENTRICLE AS CURRENT COMPLICATIONS FOLLOWING ACUTE MYOCARDIAL INFARCTION: ICD-10-CM

## 2024-02-23 DIAGNOSIS — Z79.01 LONG TERM (CURRENT) USE OF ANTICOAGULANTS: ICD-10-CM

## 2024-02-23 PROCEDURE — G0463: CPT

## 2024-02-26 DIAGNOSIS — I23.6 THROMBOSIS OF ATRIUM, AURICULAR APPENDAGE, AND VENTRICLE AS CURRENT COMPLICATIONS FOLLOWING ACUTE MYOCARDIAL INFARCTION: ICD-10-CM

## 2024-02-29 ENCOUNTER — LABORATORY RESULT (OUTPATIENT)
Age: 44
End: 2024-02-29

## 2024-03-01 ENCOUNTER — APPOINTMENT (OUTPATIENT)
Dept: MEDICATION MANAGEMENT | Facility: CLINIC | Age: 44
End: 2024-03-01

## 2024-03-01 ENCOUNTER — OUTPATIENT (OUTPATIENT)
Dept: OUTPATIENT SERVICES | Facility: HOSPITAL | Age: 44
LOS: 1 days | End: 2024-03-01
Payer: COMMERCIAL

## 2024-03-01 DIAGNOSIS — Z79.01 LONG TERM (CURRENT) USE OF ANTICOAGULANTS: ICD-10-CM

## 2024-03-01 DIAGNOSIS — I23.6 THROMBOSIS OF ATRIUM, AURICULAR APPENDAGE, AND VENTRICLE AS CURRENT COMPLICATIONS FOLLOWING ACUTE MYOCARDIAL INFARCTION: ICD-10-CM

## 2024-03-01 PROCEDURE — G0463: CPT

## 2024-03-04 DIAGNOSIS — I23.6 THROMBOSIS OF ATRIUM, AURICULAR APPENDAGE, AND VENTRICLE AS CURRENT COMPLICATIONS FOLLOWING ACUTE MYOCARDIAL INFARCTION: ICD-10-CM

## 2024-03-07 ENCOUNTER — LABORATORY RESULT (OUTPATIENT)
Age: 44
End: 2024-03-07

## 2024-03-08 ENCOUNTER — APPOINTMENT (OUTPATIENT)
Dept: MEDICATION MANAGEMENT | Facility: CLINIC | Age: 44
End: 2024-03-08

## 2024-03-08 ENCOUNTER — OUTPATIENT (OUTPATIENT)
Dept: OUTPATIENT SERVICES | Facility: HOSPITAL | Age: 44
LOS: 1 days | End: 2024-03-08
Payer: COMMERCIAL

## 2024-03-08 DIAGNOSIS — I23.6 THROMBOSIS OF ATRIUM, AURICULAR APPENDAGE, AND VENTRICLE AS CURRENT COMPLICATIONS FOLLOWING ACUTE MYOCARDIAL INFARCTION: ICD-10-CM

## 2024-03-08 DIAGNOSIS — Z79.01 LONG TERM (CURRENT) USE OF ANTICOAGULANTS: ICD-10-CM

## 2024-03-08 PROCEDURE — G0463: CPT

## 2024-03-12 DIAGNOSIS — I23.6 THROMBOSIS OF ATRIUM, AURICULAR APPENDAGE, AND VENTRICLE AS CURRENT COMPLICATIONS FOLLOWING ACUTE MYOCARDIAL INFARCTION: ICD-10-CM

## 2024-03-14 ENCOUNTER — LABORATORY RESULT (OUTPATIENT)
Age: 44
End: 2024-03-14

## 2024-03-15 ENCOUNTER — APPOINTMENT (OUTPATIENT)
Dept: MEDICATION MANAGEMENT | Facility: CLINIC | Age: 44
End: 2024-03-15

## 2024-03-15 ENCOUNTER — OUTPATIENT (OUTPATIENT)
Dept: OUTPATIENT SERVICES | Facility: HOSPITAL | Age: 44
LOS: 1 days | End: 2024-03-15
Payer: COMMERCIAL

## 2024-03-15 DIAGNOSIS — Z79.01 LONG TERM (CURRENT) USE OF ANTICOAGULANTS: ICD-10-CM

## 2024-03-15 DIAGNOSIS — I23.6 THROMBOSIS OF ATRIUM, AURICULAR APPENDAGE, AND VENTRICLE AS CURRENT COMPLICATIONS FOLLOWING ACUTE MYOCARDIAL INFARCTION: ICD-10-CM

## 2024-03-15 PROCEDURE — G0463: CPT

## 2024-03-21 ENCOUNTER — LABORATORY RESULT (OUTPATIENT)
Age: 44
End: 2024-03-21

## 2024-03-22 ENCOUNTER — OUTPATIENT (OUTPATIENT)
Dept: OUTPATIENT SERVICES | Facility: HOSPITAL | Age: 44
LOS: 1 days | End: 2024-03-22
Payer: COMMERCIAL

## 2024-03-22 ENCOUNTER — APPOINTMENT (OUTPATIENT)
Dept: MEDICATION MANAGEMENT | Facility: CLINIC | Age: 44
End: 2024-03-22

## 2024-03-22 DIAGNOSIS — I23.6 THROMBOSIS OF ATRIUM, AURICULAR APPENDAGE, AND VENTRICLE AS CURRENT COMPLICATIONS FOLLOWING ACUTE MYOCARDIAL INFARCTION: ICD-10-CM

## 2024-03-22 DIAGNOSIS — Z79.01 LONG TERM (CURRENT) USE OF ANTICOAGULANTS: ICD-10-CM

## 2024-03-22 PROCEDURE — G0463: CPT

## 2024-03-25 DIAGNOSIS — I23.6 THROMBOSIS OF ATRIUM, AURICULAR APPENDAGE, AND VENTRICLE AS CURRENT COMPLICATIONS FOLLOWING ACUTE MYOCARDIAL INFARCTION: ICD-10-CM

## 2024-03-29 ENCOUNTER — OUTPATIENT (OUTPATIENT)
Dept: OUTPATIENT SERVICES | Facility: HOSPITAL | Age: 44
LOS: 1 days | End: 2024-03-29
Payer: COMMERCIAL

## 2024-03-29 ENCOUNTER — LABORATORY RESULT (OUTPATIENT)
Age: 44
End: 2024-03-29

## 2024-03-29 ENCOUNTER — APPOINTMENT (OUTPATIENT)
Dept: MEDICATION MANAGEMENT | Facility: CLINIC | Age: 44
End: 2024-03-29

## 2024-03-29 DIAGNOSIS — Z79.01 LONG TERM (CURRENT) USE OF ANTICOAGULANTS: ICD-10-CM

## 2024-03-29 DIAGNOSIS — I23.6 THROMBOSIS OF ATRIUM, AURICULAR APPENDAGE, AND VENTRICLE AS CURRENT COMPLICATIONS FOLLOWING ACUTE MYOCARDIAL INFARCTION: ICD-10-CM

## 2024-03-29 PROCEDURE — G0463: CPT

## 2024-04-01 DIAGNOSIS — I23.6 THROMBOSIS OF ATRIUM, AURICULAR APPENDAGE, AND VENTRICLE AS CURRENT COMPLICATIONS FOLLOWING ACUTE MYOCARDIAL INFARCTION: ICD-10-CM

## 2024-04-05 ENCOUNTER — LABORATORY RESULT (OUTPATIENT)
Age: 44
End: 2024-04-05

## 2024-04-05 ENCOUNTER — APPOINTMENT (OUTPATIENT)
Dept: MEDICATION MANAGEMENT | Facility: CLINIC | Age: 44
End: 2024-04-05

## 2024-04-05 ENCOUNTER — APPOINTMENT (OUTPATIENT)
Dept: ENDOCRINOLOGY | Facility: CLINIC | Age: 44
End: 2024-04-05
Payer: COMMERCIAL

## 2024-04-05 ENCOUNTER — OUTPATIENT (OUTPATIENT)
Dept: OUTPATIENT SERVICES | Facility: HOSPITAL | Age: 44
LOS: 1 days | End: 2024-04-05
Payer: COMMERCIAL

## 2024-04-05 DIAGNOSIS — I23.6 THROMBOSIS OF ATRIUM, AURICULAR APPENDAGE, AND VENTRICLE AS CURRENT COMPLICATIONS FOLLOWING ACUTE MYOCARDIAL INFARCTION: ICD-10-CM

## 2024-04-05 DIAGNOSIS — Z79.01 LONG TERM (CURRENT) USE OF ANTICOAGULANTS: ICD-10-CM

## 2024-04-05 DIAGNOSIS — E11.9 TYPE 2 DIABETES MELLITUS W/OUT COMPLICATIONS: ICD-10-CM

## 2024-04-05 PROCEDURE — G0108 DIAB MANAGE TRN  PER INDIV: CPT

## 2024-04-05 PROCEDURE — G0463: CPT

## 2024-04-05 RX ORDER — EMPAGLIFLOZIN 25 MG/1
25 TABLET, FILM COATED ORAL
Qty: 90 | Refills: 3 | Status: ACTIVE | COMMUNITY
Start: 2023-03-10 | End: 1900-01-01

## 2024-04-05 RX ORDER — ELECTROLYTES/DEXTROSE
32G X 4 MM SOLUTION, ORAL ORAL
Qty: 100 | Refills: 2 | Status: ACTIVE | COMMUNITY
Start: 2023-05-16 | End: 1900-01-01

## 2024-04-08 ENCOUNTER — APPOINTMENT (OUTPATIENT)
Dept: MEDICATION MANAGEMENT | Facility: CLINIC | Age: 44
End: 2024-04-08

## 2024-04-08 ENCOUNTER — LABORATORY RESULT (OUTPATIENT)
Age: 44
End: 2024-04-08

## 2024-04-08 ENCOUNTER — OUTPATIENT (OUTPATIENT)
Dept: OUTPATIENT SERVICES | Facility: HOSPITAL | Age: 44
LOS: 1 days | End: 2024-04-08
Payer: COMMERCIAL

## 2024-04-08 DIAGNOSIS — I23.6 THROMBOSIS OF ATRIUM, AURICULAR APPENDAGE, AND VENTRICLE AS CURRENT COMPLICATIONS FOLLOWING ACUTE MYOCARDIAL INFARCTION: ICD-10-CM

## 2024-04-08 DIAGNOSIS — Z79.01 LONG TERM (CURRENT) USE OF ANTICOAGULANTS: ICD-10-CM

## 2024-04-08 PROCEDURE — G0463: CPT

## 2024-04-09 DIAGNOSIS — I23.6 THROMBOSIS OF ATRIUM, AURICULAR APPENDAGE, AND VENTRICLE AS CURRENT COMPLICATIONS FOLLOWING ACUTE MYOCARDIAL INFARCTION: ICD-10-CM

## 2024-04-11 ENCOUNTER — APPOINTMENT (OUTPATIENT)
Dept: CARDIOLOGY | Facility: CLINIC | Age: 44
End: 2024-04-11
Payer: COMMERCIAL

## 2024-04-11 ENCOUNTER — OUTPATIENT (OUTPATIENT)
Dept: OUTPATIENT SERVICES | Facility: HOSPITAL | Age: 44
LOS: 1 days | End: 2024-04-11
Payer: COMMERCIAL

## 2024-04-11 ENCOUNTER — APPOINTMENT (OUTPATIENT)
Dept: MEDICATION MANAGEMENT | Facility: CLINIC | Age: 44
End: 2024-04-11

## 2024-04-11 ENCOUNTER — NON-APPOINTMENT (OUTPATIENT)
Age: 44
End: 2024-04-11

## 2024-04-11 VITALS
OXYGEN SATURATION: 95 % | HEIGHT: 70 IN | DIASTOLIC BLOOD PRESSURE: 71 MMHG | BODY MASS INDEX: 25.77 KG/M2 | HEART RATE: 112 BPM | SYSTOLIC BLOOD PRESSURE: 111 MMHG | WEIGHT: 180 LBS

## 2024-04-11 DIAGNOSIS — I25.10 ATHEROSCLEROTIC HEART DISEASE OF NATIVE CORONARY ARTERY W/OUT ANGINA PECTORIS: ICD-10-CM

## 2024-04-11 DIAGNOSIS — I50.22 CHRONIC SYSTOLIC (CONGESTIVE) HEART FAILURE: ICD-10-CM

## 2024-04-11 DIAGNOSIS — Z79.01 LONG TERM (CURRENT) USE OF ANTICOAGULANTS: ICD-10-CM

## 2024-04-11 DIAGNOSIS — I23.6 THROMBOSIS OF ATRIUM, AURICULAR APPENDAGE, AND VENTRICLE AS CURRENT COMPLICATIONS FOLLOWING ACUTE MYOCARDIAL INFARCTION: ICD-10-CM

## 2024-04-11 DIAGNOSIS — I21.29 ST ELEVATION (STEMI) MYOCARDIAL INFARCTION INVOLVING OTHER SITES: ICD-10-CM

## 2024-04-11 DIAGNOSIS — I10 ESSENTIAL (PRIMARY) HYPERTENSION: ICD-10-CM

## 2024-04-11 DIAGNOSIS — E78.5 HYPERLIPIDEMIA, UNSPECIFIED: ICD-10-CM

## 2024-04-11 PROCEDURE — G0463: CPT

## 2024-04-11 PROCEDURE — 93000 ELECTROCARDIOGRAM COMPLETE: CPT

## 2024-04-11 PROCEDURE — 99214 OFFICE O/P EST MOD 30 MIN: CPT

## 2024-04-11 PROCEDURE — G2211 COMPLEX E/M VISIT ADD ON: CPT

## 2024-04-11 NOTE — HISTORY OF PRESENT ILLNESS
[FreeTextEntry1] : 43 year old man with admission to [facility] from 12/11-12/19/22. Off of Losartan but has been taking Metop,  Had AWMI with PCI to pLAD and POBA to diag TTE thereafter showed HFrEF with EF 30% and LV thrombus  #CAD- AWMI with PCI to pLAD and POBA to diag On Plavix but states he has not taken in quite some time Coumadin stopped in May bc no LV thrombus but echo reviewed and now with LV thrombus #HFrEF- Patient currently on Toprol and Entresto- he is now taking his medications TTE now with LV thrombus-back on coumadin Would like to think about ICD and will let me know #HLD-  atorvastatin back at 40 mg daily
normal...

## 2024-04-11 NOTE — DISCUSSION/SUMMARY
[EKG obtained to assist in diagnosis and management of assessed problem(s)] : EKG obtained to assist in diagnosis and management of assessed problem(s) [FreeTextEntry1] : 43 year old man with history of DM HTN HLD with recent AWMI and PCI to LAD and POBA to Daig  #CAD- AWMI with PCI to pLAD and POBA to diag On Plavix but states he has not taken in quite some time Coumadin stopped in May bc no LV thrombus but echo reviewed now with LV thormbus Continue coumadin #HFrEF- Patient currently on Toprol and Entresto- he is now taking his medications TTE now with LV thrombus-back on coumadin Would like to think about ICD and will let me know #HLD-  atorvastatin back at 40 mg daily #FU in 3 months

## 2024-04-18 ENCOUNTER — OUTPATIENT (OUTPATIENT)
Dept: OUTPATIENT SERVICES | Facility: HOSPITAL | Age: 44
LOS: 1 days | End: 2024-04-18
Payer: COMMERCIAL

## 2024-04-18 ENCOUNTER — APPOINTMENT (OUTPATIENT)
Dept: MEDICATION MANAGEMENT | Facility: CLINIC | Age: 44
End: 2024-04-18

## 2024-04-18 DIAGNOSIS — I23.6 THROMBOSIS OF ATRIUM, AURICULAR APPENDAGE, AND VENTRICLE AS CURRENT COMPLICATIONS FOLLOWING ACUTE MYOCARDIAL INFARCTION: ICD-10-CM

## 2024-04-18 DIAGNOSIS — Z79.01 LONG TERM (CURRENT) USE OF ANTICOAGULANTS: ICD-10-CM

## 2024-04-18 LAB
INR PPP: 3.4 RATIO
INR PPP: 3.6 RATIO
POCT-PROTHROMBIN TIME: 40.5 SECS
POCT-PROTHROMBIN TIME: 43.6 SECS

## 2024-04-18 PROCEDURE — G0463: CPT

## 2024-04-25 ENCOUNTER — APPOINTMENT (OUTPATIENT)
Dept: MEDICATION MANAGEMENT | Facility: CLINIC | Age: 44
End: 2024-04-25

## 2024-04-25 ENCOUNTER — OUTPATIENT (OUTPATIENT)
Dept: OUTPATIENT SERVICES | Facility: HOSPITAL | Age: 44
LOS: 1 days | End: 2024-04-25
Payer: COMMERCIAL

## 2024-04-25 DIAGNOSIS — Z79.01 LONG TERM (CURRENT) USE OF ANTICOAGULANTS: ICD-10-CM

## 2024-04-25 DIAGNOSIS — I23.6 THROMBOSIS OF ATRIUM, AURICULAR APPENDAGE, AND VENTRICLE AS CURRENT COMPLICATIONS FOLLOWING ACUTE MYOCARDIAL INFARCTION: ICD-10-CM

## 2024-04-25 LAB
INR PPP: 3.4 RATIO
POCT-PROTHROMBIN TIME: 40.4 SECS

## 2024-04-25 PROCEDURE — G0463: CPT

## 2024-05-02 ENCOUNTER — APPOINTMENT (OUTPATIENT)
Dept: MEDICATION MANAGEMENT | Facility: CLINIC | Age: 44
End: 2024-05-02

## 2024-05-02 ENCOUNTER — OUTPATIENT (OUTPATIENT)
Dept: OUTPATIENT SERVICES | Facility: HOSPITAL | Age: 44
LOS: 1 days | End: 2024-05-02
Payer: COMMERCIAL

## 2024-05-02 DIAGNOSIS — Z79.01 LONG TERM (CURRENT) USE OF ANTICOAGULANTS: ICD-10-CM

## 2024-05-02 DIAGNOSIS — I23.6 THROMBOSIS OF ATRIUM, AURICULAR APPENDAGE, AND VENTRICLE AS CURRENT COMPLICATIONS FOLLOWING ACUTE MYOCARDIAL INFARCTION: ICD-10-CM

## 2024-05-02 LAB
INR PPP: 2.3 RATIO
POCT-PROTHROMBIN TIME: 28 SECS

## 2024-05-02 PROCEDURE — G0463: CPT

## 2024-05-02 RX ORDER — WARFARIN 2.5 MG/1
2.5 TABLET ORAL DAILY
Qty: 30 | Refills: 0 | Status: ACTIVE | COMMUNITY
Start: 2024-05-02 | End: 1900-01-01

## 2024-05-02 RX ORDER — WARFARIN 2.5 MG/1
2.5 TABLET ORAL AT BEDTIME
Qty: 90 | Refills: 1 | Status: ACTIVE | COMMUNITY
Start: 2024-05-02 | End: 1900-01-01

## 2024-05-09 ENCOUNTER — OUTPATIENT (OUTPATIENT)
Dept: OUTPATIENT SERVICES | Facility: HOSPITAL | Age: 44
LOS: 1 days | End: 2024-05-09
Payer: COMMERCIAL

## 2024-05-09 ENCOUNTER — APPOINTMENT (OUTPATIENT)
Dept: MEDICATION MANAGEMENT | Facility: CLINIC | Age: 44
End: 2024-05-09

## 2024-05-09 DIAGNOSIS — Z79.01 LONG TERM (CURRENT) USE OF ANTICOAGULANTS: ICD-10-CM

## 2024-05-09 DIAGNOSIS — I23.6 THROMBOSIS OF ATRIUM, AURICULAR APPENDAGE, AND VENTRICLE AS CURRENT COMPLICATIONS FOLLOWING ACUTE MYOCARDIAL INFARCTION: ICD-10-CM

## 2024-05-09 LAB
INR PPP: 5.9 RATIO
POCT-PROTHROMBIN TIME: 70.4 SECS

## 2024-05-09 PROCEDURE — G0463: CPT

## 2024-05-16 ENCOUNTER — APPOINTMENT (OUTPATIENT)
Dept: MEDICATION MANAGEMENT | Facility: CLINIC | Age: 44
End: 2024-05-16

## 2024-05-16 ENCOUNTER — OUTPATIENT (OUTPATIENT)
Dept: OUTPATIENT SERVICES | Facility: HOSPITAL | Age: 44
LOS: 1 days | End: 2024-05-16
Payer: COMMERCIAL

## 2024-05-16 DIAGNOSIS — Z79.01 LONG TERM (CURRENT) USE OF ANTICOAGULANTS: ICD-10-CM

## 2024-05-16 DIAGNOSIS — I23.6 THROMBOSIS OF ATRIUM, AURICULAR APPENDAGE, AND VENTRICLE AS CURRENT COMPLICATIONS FOLLOWING ACUTE MYOCARDIAL INFARCTION: ICD-10-CM

## 2024-05-16 LAB
INR PPP: 1.9 RATIO
POCT-PROTHROMBIN TIME: 22.5 SECS

## 2024-05-16 PROCEDURE — G0463: CPT

## 2024-05-28 ENCOUNTER — LABORATORY RESULT (OUTPATIENT)
Age: 44
End: 2024-05-28

## 2024-05-28 ENCOUNTER — OUTPATIENT (OUTPATIENT)
Dept: OUTPATIENT SERVICES | Facility: HOSPITAL | Age: 44
LOS: 1 days | End: 2024-05-28
Payer: COMMERCIAL

## 2024-05-28 ENCOUNTER — APPOINTMENT (OUTPATIENT)
Dept: MEDICATION MANAGEMENT | Facility: CLINIC | Age: 44
End: 2024-05-28

## 2024-05-28 DIAGNOSIS — Z79.01 LONG TERM (CURRENT) USE OF ANTICOAGULANTS: ICD-10-CM

## 2024-05-28 DIAGNOSIS — I23.6 THROMBOSIS OF ATRIUM, AURICULAR APPENDAGE, AND VENTRICLE AS CURRENT COMPLICATIONS FOLLOWING ACUTE MYOCARDIAL INFARCTION: ICD-10-CM

## 2024-05-28 PROCEDURE — G0463: CPT

## 2024-05-29 DIAGNOSIS — I23.6 THROMBOSIS OF ATRIUM, AURICULAR APPENDAGE, AND VENTRICLE AS CURRENT COMPLICATIONS FOLLOWING ACUTE MYOCARDIAL INFARCTION: ICD-10-CM

## 2024-05-30 PROBLEM — Z79.01 LONG TERM CURRENT USE OF ANTICOAGULANT: Status: ACTIVE | Noted: 2024-05-30

## 2024-06-06 ENCOUNTER — APPOINTMENT (OUTPATIENT)
Dept: MEDICATION MANAGEMENT | Facility: CLINIC | Age: 44
End: 2024-06-06

## 2024-06-06 ENCOUNTER — OUTPATIENT (OUTPATIENT)
Dept: OUTPATIENT SERVICES | Facility: HOSPITAL | Age: 44
LOS: 1 days | End: 2024-06-06
Payer: COMMERCIAL

## 2024-06-06 DIAGNOSIS — I23.6 THROMBOSIS OF ATRIUM, AURICULAR APPENDAGE, AND VENTRICLE AS CURRENT COMPLICATIONS FOLLOWING ACUTE MYOCARDIAL INFARCTION: ICD-10-CM

## 2024-06-06 DIAGNOSIS — Z79.01 LONG TERM (CURRENT) USE OF ANTICOAGULANTS: ICD-10-CM

## 2024-06-06 LAB
INR PPP: 1.1 RATIO
POCT-PROTHROMBIN TIME: 13.4 SECS

## 2024-06-06 PROCEDURE — G0463: CPT

## 2024-06-14 ENCOUNTER — OUTPATIENT (OUTPATIENT)
Dept: OUTPATIENT SERVICES | Facility: HOSPITAL | Age: 44
LOS: 1 days | End: 2024-06-14
Payer: COMMERCIAL

## 2024-06-14 ENCOUNTER — APPOINTMENT (OUTPATIENT)
Dept: MEDICATION MANAGEMENT | Facility: CLINIC | Age: 44
End: 2024-06-14

## 2024-06-14 DIAGNOSIS — Z79.01 LONG TERM (CURRENT) USE OF ANTICOAGULANTS: ICD-10-CM

## 2024-06-14 DIAGNOSIS — I23.6 THROMBOSIS OF ATRIUM, AURICULAR APPENDAGE, AND VENTRICLE AS CURRENT COMPLICATIONS FOLLOWING ACUTE MYOCARDIAL INFARCTION: ICD-10-CM

## 2024-06-14 LAB
INR PPP: 1.2 RATIO
POCT-PROTHROMBIN TIME: 14.4 SECS

## 2024-06-14 PROCEDURE — G0463: CPT

## 2024-06-20 ENCOUNTER — OUTPATIENT (OUTPATIENT)
Dept: OUTPATIENT SERVICES | Facility: HOSPITAL | Age: 44
LOS: 1 days | End: 2024-06-20
Payer: COMMERCIAL

## 2024-06-20 ENCOUNTER — APPOINTMENT (OUTPATIENT)
Dept: MEDICATION MANAGEMENT | Facility: CLINIC | Age: 44
End: 2024-06-20

## 2024-06-20 DIAGNOSIS — I23.6 THROMBOSIS OF ATRIUM, AURICULAR APPENDAGE, AND VENTRICLE AS CURRENT COMPLICATIONS FOLLOWING ACUTE MYOCARDIAL INFARCTION: ICD-10-CM

## 2024-06-20 DIAGNOSIS — Z79.01 LONG TERM (CURRENT) USE OF ANTICOAGULANTS: ICD-10-CM

## 2024-06-20 LAB
INR PPP: 5.7 RATIO
POCT-PROTHROMBIN TIME: 68.6 SECS

## 2024-06-20 PROCEDURE — G0463: CPT

## 2024-06-25 ENCOUNTER — OUTPATIENT (OUTPATIENT)
Dept: OUTPATIENT SERVICES | Facility: HOSPITAL | Age: 44
LOS: 1 days | End: 2024-06-25
Payer: COMMERCIAL

## 2024-06-25 ENCOUNTER — APPOINTMENT (OUTPATIENT)
Dept: MEDICATION MANAGEMENT | Facility: CLINIC | Age: 44
End: 2024-06-25

## 2024-06-25 DIAGNOSIS — Z79.01 LONG TERM (CURRENT) USE OF ANTICOAGULANTS: ICD-10-CM

## 2024-06-25 DIAGNOSIS — I23.6 THROMBOSIS OF ATRIUM, AURICULAR APPENDAGE, AND VENTRICLE AS CURRENT COMPLICATIONS FOLLOWING ACUTE MYOCARDIAL INFARCTION: ICD-10-CM

## 2024-06-25 PROCEDURE — G0463: CPT

## 2024-06-26 DIAGNOSIS — I23.6 THROMBOSIS OF ATRIUM, AURICULAR APPENDAGE, AND VENTRICLE AS CURRENT COMPLICATIONS FOLLOWING ACUTE MYOCARDIAL INFARCTION: ICD-10-CM

## 2024-06-26 LAB
INR PPP: 1.1 RATIO
POCT-PROTHROMBIN TIME: 13.2 SECS

## 2024-07-02 ENCOUNTER — APPOINTMENT (OUTPATIENT)
Dept: MEDICATION MANAGEMENT | Facility: CLINIC | Age: 44
End: 2024-07-02

## 2024-07-08 ENCOUNTER — OUTPATIENT (OUTPATIENT)
Dept: OUTPATIENT SERVICES | Facility: HOSPITAL | Age: 44
LOS: 1 days | End: 2024-07-08
Payer: COMMERCIAL

## 2024-07-08 ENCOUNTER — APPOINTMENT (OUTPATIENT)
Dept: MEDICATION MANAGEMENT | Facility: CLINIC | Age: 44
End: 2024-07-08

## 2024-07-08 DIAGNOSIS — Z79.01 LONG TERM (CURRENT) USE OF ANTICOAGULANTS: ICD-10-CM

## 2024-07-08 DIAGNOSIS — I23.6 THROMBOSIS OF ATRIUM, AURICULAR APPENDAGE, AND VENTRICLE AS CURRENT COMPLICATIONS FOLLOWING ACUTE MYOCARDIAL INFARCTION: ICD-10-CM

## 2024-07-08 PROCEDURE — G0463: CPT

## 2024-07-12 ENCOUNTER — APPOINTMENT (OUTPATIENT)
Dept: MEDICATION MANAGEMENT | Facility: CLINIC | Age: 44
End: 2024-07-12

## 2024-07-12 ENCOUNTER — OUTPATIENT (OUTPATIENT)
Dept: OUTPATIENT SERVICES | Facility: HOSPITAL | Age: 44
LOS: 1 days | End: 2024-07-12
Payer: COMMERCIAL

## 2024-07-12 DIAGNOSIS — I23.6 THROMBOSIS OF ATRIUM, AURICULAR APPENDAGE, AND VENTRICLE AS CURRENT COMPLICATIONS FOLLOWING ACUTE MYOCARDIAL INFARCTION: ICD-10-CM

## 2024-07-12 DIAGNOSIS — Z79.01 LONG TERM (CURRENT) USE OF ANTICOAGULANTS: ICD-10-CM

## 2024-07-12 LAB
INR PPP: 1.4 RATIO
POCT-PROTHROMBIN TIME: 16.7 SECS

## 2024-07-12 PROCEDURE — G0463: CPT

## 2024-07-16 DIAGNOSIS — I23.6 THROMBOSIS OF ATRIUM, AURICULAR APPENDAGE, AND VENTRICLE AS CURRENT COMPLICATIONS FOLLOWING ACUTE MYOCARDIAL INFARCTION: ICD-10-CM

## 2024-07-18 ENCOUNTER — NON-APPOINTMENT (OUTPATIENT)
Age: 44
End: 2024-07-18

## 2024-07-18 ENCOUNTER — APPOINTMENT (OUTPATIENT)
Dept: MEDICATION MANAGEMENT | Facility: CLINIC | Age: 44
End: 2024-07-18

## 2024-07-18 ENCOUNTER — APPOINTMENT (OUTPATIENT)
Dept: CARDIOLOGY | Facility: CLINIC | Age: 44
End: 2024-07-18
Payer: COMMERCIAL

## 2024-07-18 ENCOUNTER — OUTPATIENT (OUTPATIENT)
Dept: OUTPATIENT SERVICES | Facility: HOSPITAL | Age: 44
LOS: 1 days | End: 2024-07-18
Payer: COMMERCIAL

## 2024-07-18 VITALS
BODY MASS INDEX: 26.63 KG/M2 | HEART RATE: 107 BPM | WEIGHT: 186 LBS | OXYGEN SATURATION: 97 % | SYSTOLIC BLOOD PRESSURE: 159 MMHG | HEIGHT: 70 IN | DIASTOLIC BLOOD PRESSURE: 105 MMHG

## 2024-07-18 VITALS — SYSTOLIC BLOOD PRESSURE: 149 MMHG | DIASTOLIC BLOOD PRESSURE: 91 MMHG

## 2024-07-18 DIAGNOSIS — I21.29 ST ELEVATION (STEMI) MYOCARDIAL INFARCTION INVOLVING OTHER SITES: ICD-10-CM

## 2024-07-18 DIAGNOSIS — I25.10 ATHEROSCLEROTIC HEART DISEASE OF NATIVE CORONARY ARTERY W/OUT ANGINA PECTORIS: ICD-10-CM

## 2024-07-18 DIAGNOSIS — I50.22 CHRONIC SYSTOLIC (CONGESTIVE) HEART FAILURE: ICD-10-CM

## 2024-07-18 DIAGNOSIS — I23.6 THROMBOSIS OF ATRIUM, AURICULAR APPENDAGE, AND VENTRICLE AS CURRENT COMPLICATIONS FOLLOWING ACUTE MYOCARDIAL INFARCTION: ICD-10-CM

## 2024-07-18 DIAGNOSIS — Z79.01 LONG TERM (CURRENT) USE OF ANTICOAGULANTS: ICD-10-CM

## 2024-07-18 LAB
INR PPP: 2.9 RATIO
POCT-PROTHROMBIN TIME: 35.3 SECS

## 2024-07-18 PROCEDURE — 99214 OFFICE O/P EST MOD 30 MIN: CPT

## 2024-07-18 PROCEDURE — G2211 COMPLEX E/M VISIT ADD ON: CPT

## 2024-07-18 PROCEDURE — 93000 ELECTROCARDIOGRAM COMPLETE: CPT

## 2024-07-18 PROCEDURE — G0463: CPT

## 2024-07-19 ENCOUNTER — APPOINTMENT (OUTPATIENT)
Dept: ENDOCRINOLOGY | Facility: CLINIC | Age: 44
End: 2024-07-19
Payer: COMMERCIAL

## 2024-07-19 VITALS
OXYGEN SATURATION: 99 % | WEIGHT: 185 LBS | DIASTOLIC BLOOD PRESSURE: 92 MMHG | BODY MASS INDEX: 26.48 KG/M2 | HEIGHT: 70 IN | HEART RATE: 81 BPM | SYSTOLIC BLOOD PRESSURE: 140 MMHG

## 2024-07-19 DIAGNOSIS — E78.5 HYPERLIPIDEMIA, UNSPECIFIED: ICD-10-CM

## 2024-07-19 DIAGNOSIS — E11.9 TYPE 2 DIABETES MELLITUS W/OUT COMPLICATIONS: ICD-10-CM

## 2024-07-19 DIAGNOSIS — I10 ESSENTIAL (PRIMARY) HYPERTENSION: ICD-10-CM

## 2024-07-19 LAB
GLUCOSE BLDC GLUCOMTR-MCNC: 99
HBA1C MFR BLD HPLC: 6.8

## 2024-07-19 PROCEDURE — 82962 GLUCOSE BLOOD TEST: CPT

## 2024-07-19 PROCEDURE — 99214 OFFICE O/P EST MOD 30 MIN: CPT

## 2024-07-19 PROCEDURE — 83036 HEMOGLOBIN GLYCOSYLATED A1C: CPT | Mod: QW

## 2024-07-23 LAB
ALBUMIN SERPL ELPH-MCNC: 4.7 G/DL
ALP BLD-CCNC: 40 U/L
ALT SERPL-CCNC: 42 U/L
ANION GAP SERPL CALC-SCNC: 13 MMOL/L
AST SERPL-CCNC: 41 U/L
BILIRUB SERPL-MCNC: 0.7 MG/DL
BUN SERPL-MCNC: 15 MG/DL
C PEPTIDE SERPL-MCNC: 2.7 NG/ML
CALCIUM SERPL-MCNC: 9.6 MG/DL
CHLORIDE SERPL-SCNC: 105 MMOL/L
CHOLEST SERPL-MCNC: 208 MG/DL
CO2 SERPL-SCNC: 23 MMOL/L
CREAT SERPL-MCNC: 0.8 MG/DL
CREAT SPEC-SCNC: 64 MG/DL
EGFR: 113 ML/MIN/1.73M2
GLUCOSE SERPL-MCNC: 98 MG/DL
HDLC SERPL-MCNC: 77 MG/DL
LDLC SERPL CALC-MCNC: 107 MG/DL
MICROALBUMIN 24H UR DL<=1MG/L-MCNC: 5.3 MG/DL
MICROALBUMIN/CREAT 24H UR-RTO: 83 MG/G
NONHDLC SERPL-MCNC: 131 MG/DL
PANC ISLET CELL AB SER QL: NORMAL
POTASSIUM SERPL-SCNC: 4.7 MMOL/L
PROT SERPL-MCNC: 7.5 G/DL
SODIUM SERPL-SCNC: 141 MMOL/L
TRIGL SERPL-MCNC: 141 MG/DL
TSH SERPL-ACNC: 1.02 UIU/ML

## 2024-07-24 LAB — GAD65 AB SER-MCNC: 0.08 NMOL/L

## 2024-07-25 ENCOUNTER — APPOINTMENT (OUTPATIENT)
Dept: MEDICATION MANAGEMENT | Facility: CLINIC | Age: 44
End: 2024-07-25

## 2024-07-25 LAB
INR PPP: 1.8 RATIO
POCT-PROTHROMBIN TIME: 22.1 SECS

## 2024-07-28 LAB — ZINC TRANSPORTER 8 AB: <15 U/ML

## 2024-08-02 ENCOUNTER — OUTPATIENT (OUTPATIENT)
Dept: OUTPATIENT SERVICES | Facility: HOSPITAL | Age: 44
LOS: 1 days | End: 2024-08-02
Payer: COMMERCIAL

## 2024-08-02 ENCOUNTER — APPOINTMENT (OUTPATIENT)
Dept: MEDICATION MANAGEMENT | Facility: CLINIC | Age: 44
End: 2024-08-02

## 2024-08-02 DIAGNOSIS — Z79.01 LONG TERM (CURRENT) USE OF ANTICOAGULANTS: ICD-10-CM

## 2024-08-02 DIAGNOSIS — I23.6 THROMBOSIS OF ATRIUM, AURICULAR APPENDAGE, AND VENTRICLE AS CURRENT COMPLICATIONS FOLLOWING ACUTE MYOCARDIAL INFARCTION: ICD-10-CM

## 2024-08-02 LAB
INR PPP: 1.8 RATIO
POCT-PROTHROMBIN TIME: 22 SECS

## 2024-08-02 PROCEDURE — G0463: CPT

## 2024-08-08 ENCOUNTER — APPOINTMENT (OUTPATIENT)
Dept: MEDICATION MANAGEMENT | Facility: CLINIC | Age: 44
End: 2024-08-08

## 2024-08-08 ENCOUNTER — OUTPATIENT (OUTPATIENT)
Dept: OUTPATIENT SERVICES | Facility: HOSPITAL | Age: 44
LOS: 1 days | End: 2024-08-08
Payer: COMMERCIAL

## 2024-08-08 DIAGNOSIS — I23.6 THROMBOSIS OF ATRIUM, AURICULAR APPENDAGE, AND VENTRICLE AS CURRENT COMPLICATIONS FOLLOWING ACUTE MYOCARDIAL INFARCTION: ICD-10-CM

## 2024-08-08 DIAGNOSIS — Z79.01 LONG TERM (CURRENT) USE OF ANTICOAGULANTS: ICD-10-CM

## 2024-08-08 PROCEDURE — G0463: CPT

## 2024-08-12 ENCOUNTER — APPOINTMENT (OUTPATIENT)
Dept: MEDICATION MANAGEMENT | Facility: CLINIC | Age: 44
End: 2024-08-12

## 2024-08-12 LAB
INR PPP: 3 RATIO
POCT-PROTHROMBIN TIME: 35.8 SECS

## 2024-08-15 ENCOUNTER — APPOINTMENT (OUTPATIENT)
Dept: MEDICATION MANAGEMENT | Facility: CLINIC | Age: 44
End: 2024-08-15

## 2024-08-19 ENCOUNTER — RX RENEWAL (OUTPATIENT)
Age: 44
End: 2024-08-19

## 2024-08-20 ENCOUNTER — OUTPATIENT (OUTPATIENT)
Dept: OUTPATIENT SERVICES | Facility: HOSPITAL | Age: 44
LOS: 1 days | End: 2024-08-20
Payer: COMMERCIAL

## 2024-08-20 ENCOUNTER — APPOINTMENT (OUTPATIENT)
Dept: MEDICATION MANAGEMENT | Facility: CLINIC | Age: 44
End: 2024-08-20

## 2024-08-20 DIAGNOSIS — I23.6 THROMBOSIS OF ATRIUM, AURICULAR APPENDAGE, AND VENTRICLE AS CURRENT COMPLICATIONS FOLLOWING ACUTE MYOCARDIAL INFARCTION: ICD-10-CM

## 2024-08-20 DIAGNOSIS — Z79.01 LONG TERM (CURRENT) USE OF ANTICOAGULANTS: ICD-10-CM

## 2024-08-20 LAB
INR PPP: 1.2 RATIO
POCT-PROTHROMBIN TIME: 14.8 SECS

## 2024-08-20 PROCEDURE — G0463: CPT

## 2024-08-28 ENCOUNTER — OUTPATIENT (OUTPATIENT)
Dept: OUTPATIENT SERVICES | Facility: HOSPITAL | Age: 44
LOS: 1 days | End: 2024-08-28
Payer: COMMERCIAL

## 2024-08-28 ENCOUNTER — APPOINTMENT (OUTPATIENT)
Dept: MEDICATION MANAGEMENT | Facility: CLINIC | Age: 44
End: 2024-08-28

## 2024-08-28 DIAGNOSIS — Z79.01 LONG TERM (CURRENT) USE OF ANTICOAGULANTS: ICD-10-CM

## 2024-08-28 DIAGNOSIS — I23.6 THROMBOSIS OF ATRIUM, AURICULAR APPENDAGE, AND VENTRICLE AS CURRENT COMPLICATIONS FOLLOWING ACUTE MYOCARDIAL INFARCTION: ICD-10-CM

## 2024-08-28 LAB
INR PPP: 1.4 RATIO
POCT-PROTHROMBIN TIME: 17 SECS

## 2024-08-28 PROCEDURE — G0463: CPT

## 2024-08-29 ENCOUNTER — APPOINTMENT (OUTPATIENT)
Dept: MEDICATION MANAGEMENT | Facility: CLINIC | Age: 44
End: 2024-08-29

## 2024-09-05 ENCOUNTER — OUTPATIENT (OUTPATIENT)
Dept: OUTPATIENT SERVICES | Facility: HOSPITAL | Age: 44
LOS: 1 days | End: 2024-09-05
Payer: COMMERCIAL

## 2024-09-05 ENCOUNTER — APPOINTMENT (OUTPATIENT)
Dept: MEDICATION MANAGEMENT | Facility: CLINIC | Age: 44
End: 2024-09-05

## 2024-09-05 DIAGNOSIS — Z79.01 LONG TERM (CURRENT) USE OF ANTICOAGULANTS: ICD-10-CM

## 2024-09-05 DIAGNOSIS — I23.6 THROMBOSIS OF ATRIUM, AURICULAR APPENDAGE, AND VENTRICLE AS CURRENT COMPLICATIONS FOLLOWING ACUTE MYOCARDIAL INFARCTION: ICD-10-CM

## 2024-09-05 LAB
INR PPP: 2.7 RATIO
POCT-PROTHROMBIN TIME: 32.3 SECS

## 2024-09-05 PROCEDURE — G0463: CPT

## 2024-09-06 DIAGNOSIS — I23.6 THROMBOSIS OF ATRIUM, AURICULAR APPENDAGE, AND VENTRICLE AS CURRENT COMPLICATIONS FOLLOWING ACUTE MYOCARDIAL INFARCTION: ICD-10-CM

## 2024-09-06 DIAGNOSIS — Z79.01 LONG TERM (CURRENT) USE OF ANTICOAGULANTS: ICD-10-CM

## 2024-09-12 ENCOUNTER — OUTPATIENT (OUTPATIENT)
Dept: OUTPATIENT SERVICES | Facility: HOSPITAL | Age: 44
LOS: 1 days | End: 2024-09-12
Payer: COMMERCIAL

## 2024-09-12 ENCOUNTER — APPOINTMENT (OUTPATIENT)
Dept: MEDICATION MANAGEMENT | Facility: CLINIC | Age: 44
End: 2024-09-12

## 2024-09-12 DIAGNOSIS — Z79.01 LONG TERM (CURRENT) USE OF ANTICOAGULANTS: ICD-10-CM

## 2024-09-12 DIAGNOSIS — I23.6 THROMBOSIS OF ATRIUM, AURICULAR APPENDAGE, AND VENTRICLE AS CURRENT COMPLICATIONS FOLLOWING ACUTE MYOCARDIAL INFARCTION: ICD-10-CM

## 2024-09-12 LAB
INR PPP: 8 RATIO
POCT-PROTHROMBIN TIME: 96 SECS

## 2024-09-12 PROCEDURE — G0463: CPT

## 2024-09-13 DIAGNOSIS — I23.6 THROMBOSIS OF ATRIUM, AURICULAR APPENDAGE, AND VENTRICLE AS CURRENT COMPLICATIONS FOLLOWING ACUTE MYOCARDIAL INFARCTION: ICD-10-CM

## 2024-09-18 ENCOUNTER — APPOINTMENT (OUTPATIENT)
Dept: MEDICATION MANAGEMENT | Facility: CLINIC | Age: 44
End: 2024-09-18

## 2024-09-19 ENCOUNTER — APPOINTMENT (OUTPATIENT)
Dept: MEDICATION MANAGEMENT | Facility: CLINIC | Age: 44
End: 2024-09-19

## 2024-09-19 ENCOUNTER — LABORATORY RESULT (OUTPATIENT)
Age: 44
End: 2024-09-19

## 2024-09-19 ENCOUNTER — OUTPATIENT (OUTPATIENT)
Dept: OUTPATIENT SERVICES | Facility: HOSPITAL | Age: 44
LOS: 1 days | End: 2024-09-19
Payer: COMMERCIAL

## 2024-09-19 DIAGNOSIS — Z79.01 LONG TERM (CURRENT) USE OF ANTICOAGULANTS: ICD-10-CM

## 2024-09-19 DIAGNOSIS — I23.6 THROMBOSIS OF ATRIUM, AURICULAR APPENDAGE, AND VENTRICLE AS CURRENT COMPLICATIONS FOLLOWING ACUTE MYOCARDIAL INFARCTION: ICD-10-CM

## 2024-09-19 LAB
INR PPP: 1.3 RATIO
POCT-PROTHROMBIN TIME: 15.2 SECS

## 2024-09-19 PROCEDURE — G0463: CPT

## 2024-09-20 DIAGNOSIS — I23.6 THROMBOSIS OF ATRIUM, AURICULAR APPENDAGE, AND VENTRICLE AS CURRENT COMPLICATIONS FOLLOWING ACUTE MYOCARDIAL INFARCTION: ICD-10-CM

## 2024-09-20 DIAGNOSIS — Z79.01 LONG TERM (CURRENT) USE OF ANTICOAGULANTS: ICD-10-CM

## 2024-09-23 ENCOUNTER — APPOINTMENT (OUTPATIENT)
Dept: MEDICATION MANAGEMENT | Facility: CLINIC | Age: 44
End: 2024-09-23

## 2024-09-24 ENCOUNTER — APPOINTMENT (OUTPATIENT)
Dept: MEDICATION MANAGEMENT | Facility: CLINIC | Age: 44
End: 2024-09-24

## 2024-10-03 ENCOUNTER — LABORATORY RESULT (OUTPATIENT)
Age: 44
End: 2024-10-03

## 2024-10-04 ENCOUNTER — OUTPATIENT (OUTPATIENT)
Dept: OUTPATIENT SERVICES | Facility: HOSPITAL | Age: 44
LOS: 1 days | End: 2024-10-04
Payer: COMMERCIAL

## 2024-10-04 ENCOUNTER — APPOINTMENT (OUTPATIENT)
Dept: MEDICATION MANAGEMENT | Facility: CLINIC | Age: 44
End: 2024-10-04

## 2024-10-04 DIAGNOSIS — I23.6 THROMBOSIS OF ATRIUM, AURICULAR APPENDAGE, AND VENTRICLE AS CURRENT COMPLICATIONS FOLLOWING ACUTE MYOCARDIAL INFARCTION: ICD-10-CM

## 2024-10-04 DIAGNOSIS — Z79.01 LONG TERM (CURRENT) USE OF ANTICOAGULANTS: ICD-10-CM

## 2024-10-04 LAB
INR PPP: 1.3 RATIO
POCT-PROTHROMBIN TIME: 16 SECS

## 2024-10-04 PROCEDURE — G0463: CPT

## 2024-10-08 ENCOUNTER — APPOINTMENT (OUTPATIENT)
Dept: ENDOCRINOLOGY | Facility: CLINIC | Age: 44
End: 2024-10-08

## 2024-10-10 ENCOUNTER — LABORATORY RESULT (OUTPATIENT)
Age: 44
End: 2024-10-10

## 2024-10-11 ENCOUNTER — OUTPATIENT (OUTPATIENT)
Dept: OUTPATIENT SERVICES | Facility: HOSPITAL | Age: 44
LOS: 1 days | End: 2024-10-11
Payer: COMMERCIAL

## 2024-10-11 ENCOUNTER — APPOINTMENT (OUTPATIENT)
Dept: MEDICATION MANAGEMENT | Facility: CLINIC | Age: 44
End: 2024-10-11

## 2024-10-11 DIAGNOSIS — Z79.01 LONG TERM (CURRENT) USE OF ANTICOAGULANTS: ICD-10-CM

## 2024-10-11 DIAGNOSIS — I23.6 THROMBOSIS OF ATRIUM, AURICULAR APPENDAGE, AND VENTRICLE AS CURRENT COMPLICATIONS FOLLOWING ACUTE MYOCARDIAL INFARCTION: ICD-10-CM

## 2024-10-11 PROCEDURE — G0463: CPT

## 2024-10-13 ENCOUNTER — EMERGENCY (EMERGENCY)
Facility: HOSPITAL | Age: 44
LOS: 1 days | Discharge: HOME | End: 2024-10-13
Attending: EMERGENCY MEDICINE | Admitting: EMERGENCY MEDICINE
Payer: COMMERCIAL

## 2024-10-13 VITALS
DIASTOLIC BLOOD PRESSURE: 108 MMHG | TEMPERATURE: 98 F | OXYGEN SATURATION: 98 % | HEIGHT: 68 IN | RESPIRATION RATE: 16 BRPM | HEART RATE: 91 BPM | SYSTOLIC BLOOD PRESSURE: 155 MMHG | WEIGHT: 181 LBS

## 2024-10-13 PROCEDURE — 93010 ELECTROCARDIOGRAM REPORT: CPT

## 2024-10-13 PROCEDURE — 99284 EMERGENCY DEPT VISIT MOD MDM: CPT

## 2024-10-13 NOTE — ED PROVIDER NOTE - PATIENT PORTAL LINK FT
You can access the FollowMyHealth Patient Portal offered by [facility] by registering at the following website: [email or web address]. By joining [facility]’s FollowMyHealth portal, you will also be able to view your health information using other applications (apps) compatible with our system.

## 2024-10-13 NOTE — ED PROVIDER NOTE - NSFOLLOWUPINSTRUCTIONS_ED_ALL_ED_FT
Dizziness  WHAT YOU NEED TO KNOW   Dizziness is a common problem. It makes you feel unsteady or light-headed. You may feel like you are about to pass out (faint). Dizziness can lead to getting hurt if you stumble or fall. Dizziness can be caused by many things, including:    Medicines.  Not having enough water in your body (dehydration).  Illness.    Follow these instructions at home:  Eating and drinking    Drink enough fluid to keep your pee (urine) clear or pale yellow. This helps to keep you from getting dehydrated. Try to drink more clear fluids, such as water.  Do not drink alcohol.  Limit how much caffeine you drink or eat, if your doctor tells you to do that.  Limit how much salt (sodium) you drink or eat, if your doctor tells you to do that.     Activity    Avoid making quick movements.  When you stand up from sitting in a chair, steady yourself until you feel okay.  In the morning, first sit up on the side of the bed. When you feel okay, stand slowly while you hold onto something. Do this until you know that your balance is fine.  If you need to  one place for a long time, move your legs often. Tighten and relax the muscles in your legs while you are standing.  Do not drive or use heavy machinery if you feel dizzy.  Avoid bending down if you feel dizzy. Place items in your home so you can reach them easily without leaning over.     Lifestyle    Do not use any products that contain nicotine or tobacco, such as cigarettes and e-cigarettes. If you need help quitting, ask your doctor.  Try to lower your stress level. You can do this by using methods such as yoga or meditation. Talk with your doctor if you need help.    General instructions    Watch your dizziness for any changes.  Take over-the-counter and prescription medicines only as told by your doctor. Talk with your doctor if you think that you are dizzy because of a medicine that you are taking.  Tell a friend or a family member that you are feeling dizzy. If he or she notices any changes in your behavior, have this person call your doctor.  Keep all follow-up visits as told by your doctor. This is important.    Contact a doctor if:  Your dizziness does not go away.  Your dizziness or light-headedness gets worse.  You feel sick to your stomach (nauseous).  You have trouble hearing.  You have new symptoms.  You are unsteady on your feet.  You feel like the room is spinning.    Get help right away if:  You throw up (vomit) or have watery poop (diarrhea), and you cannot eat or drink anything.  You have trouble:  Talking.  Walking.  Swallowing.  Using your arms, hands, or legs.  You feel generally weak.  You are not thinking clearly, or you have trouble forming sentences. A friend or family member may notice this.  You have:  Chest pain.  Pain in your belly (abdomen).  Shortness of breath.  Sweating.  Your vision changes.  You are bleeding.  You have a very bad headache.  You have neck pain or a stiff neck.  You have a fever.    These symptoms may be an emergency. Do not wait to see if the symptoms will go away. Get medical help right away. Call your local emergency services (911 in the [place] Do not drive yourself to the hospital.    Summary  Dizziness makes you feel unsteady or light-headed. You may feel like you are about to pass out (faint).  Drink enough fluid to keep your pee (urine) clear or pale yellow. Do not drink alcohol.  Avoid making quick movements if you feel dizzy.  Watch your dizziness for any changes.    ADDITIONAL NOTES AND INSTRUCTIONS    Please follow up with your Primary MD in 24-48 hr.  Seek immediate medical care for any new/worsening signs or symptoms.

## 2024-10-13 NOTE — ED ADULT NURSE NOTE - OBJECTIVE STATEMENT
Received patient in Intake 9 c/o dizziness today, patient denies SOB, chest pain, fever. Patient is A&OX4, ambulatory, airway patent, breathing unlabored and even. Side rails up and safety maintained.

## 2024-10-13 NOTE — ED PROVIDER NOTE - CLINICAL SUMMARY MEDICAL DECISION MAKING FREE TEXT BOX
The patient is a 44y Male who has a past medical and surgery history of  DM2 CAD prox LED stent EF 30%w/LV thrombus Kidney stones Varicocele present on ultrasound of scrotum  PTED from  for eval of episode of  dizziness. Pt developed s/s getting out of bed his am episode lasted lasting about 1minute pt did not eat this morning but otherwsie is in his usual state of health and s/s have resolved  Vital Signs Stable   PE: as described;   DATA:  EKG:NSR@ 85; ?age Anteroseptal infarct c/w 12/13/22 EKG low voltage Qrs and findings suspicious for lateral wall MI have resolved     LAB: Pending at time of evaluation    IMPRESSION/RISK:  Dx: Dizziness   Consideration include Episode has resolved patient reliable historian with ability to return if needed FS normal  Plan  as above

## 2024-10-13 NOTE — ED ADULT NURSE NOTE - NSFALLUNIVINTERV_ED_ALL_ED
Bed/Stretcher in lowest position, wheels locked, appropriate side rails in place/Call bell, personal items and telephone in reach/Instruct patient to call for assistance before getting out of bed/chair/stretcher/Non-slip footwear applied when patient is off stretcher/[place]ie to call system/Physically safe environment - no spills, clutter or unnecessary equipment/Purposeful proactive rounding/Room/bathroom lighting operational, light cord in reach

## 2024-10-13 NOTE — ED ADULT TRIAGE NOTE - CHIEF COMPLAINT QUOTE
Pt sent from [facility] for eval of dizziness/  pt deneis dizziness at present   dizziness upon getting out of bed Pt sent from [facility] for eval of dizziness/  pt deneis dizziness at present   dizziness upon getting out of bed lasting about 1minute pt did not eat this morning

## 2024-10-13 NOTE — ED PROVIDER NOTE - CARE PROVIDER_API CALL
Kelsie Smyth  Cardiovascular Disease  [address] [place] [address], [address]  [place] [address]  Phone: [phone]  Fax: [phone]  Established Patient  Follow Up Time: 1-3 Days

## 2024-10-13 NOTE — ED ADULT NURSE NOTE - CHIEF COMPLAINT QUOTE
I have reviewed and confirmed nurses' notes for patient's medications, allergies, medical history, and surgical history. Pt sent from [facility] for eval of dizziness/  pt deneis dizziness at present   dizziness upon getting out of bed lasting about 1minute pt did not eat this morning

## 2024-10-13 NOTE — ED ADULT NURSE NOTE - DISCHARGE DATE/TIME
13-Oct-2024 10:07 Bi-Rhombic Flap Text: The defect edges were debeveled with a #15 scalpel blade.  Given the location of the defect and the proximity to free margins a bi-rhombic flap was deemed most appropriate.  Using a sterile surgical marker, an appropriate rhombic flap was drawn incorporating the defect. The area thus outlined was incised deep to adipose tissue with a #15 scalpel blade.  The skin margins were undermined to an appropriate distance in all directions utilizing iris scissors.

## 2024-10-13 NOTE — ED PROVIDER NOTE - NSICDXPASTMEDICALHX_GEN_ALL_CORE_FT
PAST MEDICAL HISTORY:  CAD (coronary artery disease) JESUS LAD    Controlled type 2 diabetes mellitus without complication, unspecified long term insulin use status Pt reports newly diagnosed 1 month ago with DM 2, started on insulin and states FS are less than 100 in AM    Kidney stones right    Systolic CHF EF 30% f/u with Dr Kelsie Smyth    Varicocele present on ultrasound of scrotum

## 2024-10-13 NOTE — ED PROVIDER NOTE - OBJECTIVE STATEMENT
The patient is a 44y Male who has a past medical and surgery history of  DM2 CAD prox LED stent EF 30%w/LV thrombus Kidney stones Varicocele present on ultrasound of scrotum  PTED from  for eval of episode of  dizziness. Pt developed s/s getting out of bed his am episode lasted lasting about 1minute pt did not eat this morning but otherwsie is in his usual state of health and s/s have resolved

## 2024-10-17 ENCOUNTER — APPOINTMENT (OUTPATIENT)
Dept: MEDICATION MANAGEMENT | Facility: CLINIC | Age: 44
End: 2024-10-17

## 2024-10-17 ENCOUNTER — OUTPATIENT (OUTPATIENT)
Dept: OUTPATIENT SERVICES | Facility: HOSPITAL | Age: 44
LOS: 1 days | End: 2024-10-17
Payer: COMMERCIAL

## 2024-10-17 ENCOUNTER — LABORATORY RESULT (OUTPATIENT)
Age: 44
End: 2024-10-17

## 2024-10-17 DIAGNOSIS — I23.6 THROMBOSIS OF ATRIUM, AURICULAR APPENDAGE, AND VENTRICLE AS CURRENT COMPLICATIONS FOLLOWING ACUTE MYOCARDIAL INFARCTION: ICD-10-CM

## 2024-10-17 DIAGNOSIS — Z79.01 LONG TERM (CURRENT) USE OF ANTICOAGULANTS: ICD-10-CM

## 2024-10-17 PROCEDURE — G0463: CPT

## 2024-10-24 ENCOUNTER — OUTPATIENT (OUTPATIENT)
Dept: OUTPATIENT SERVICES | Facility: HOSPITAL | Age: 44
LOS: 1 days | End: 2024-10-24
Payer: COMMERCIAL

## 2024-10-24 ENCOUNTER — APPOINTMENT (OUTPATIENT)
Dept: MEDICATION MANAGEMENT | Facility: CLINIC | Age: 44
End: 2024-10-24

## 2024-10-24 ENCOUNTER — LABORATORY RESULT (OUTPATIENT)
Age: 44
End: 2024-10-24

## 2024-10-24 DIAGNOSIS — Z79.01 LONG TERM (CURRENT) USE OF ANTICOAGULANTS: ICD-10-CM

## 2024-10-24 DIAGNOSIS — I23.6 THROMBOSIS OF ATRIUM, AURICULAR APPENDAGE, AND VENTRICLE AS CURRENT COMPLICATIONS FOLLOWING ACUTE MYOCARDIAL INFARCTION: ICD-10-CM

## 2024-10-24 PROCEDURE — G0463: CPT

## 2024-10-25 PROBLEM — I25.10 ATHEROSCLEROTIC HEART DISEASE OF NATIVE CORONARY ARTERY WITHOUT ANGINA PECTORIS: Chronic | Status: ACTIVE | Noted: 2024-10-20

## 2024-10-25 PROBLEM — I50.20 UNSPECIFIED SYSTOLIC (CONGESTIVE) HEART FAILURE: Chronic | Status: ACTIVE | Noted: 2024-10-20

## 2024-10-31 ENCOUNTER — LABORATORY RESULT (OUTPATIENT)
Age: 44
End: 2024-10-31

## 2024-11-01 ENCOUNTER — OUTPATIENT (OUTPATIENT)
Dept: OUTPATIENT SERVICES | Facility: HOSPITAL | Age: 44
LOS: 1 days | End: 2024-11-01
Payer: COMMERCIAL

## 2024-11-01 ENCOUNTER — APPOINTMENT (OUTPATIENT)
Dept: MEDICATION MANAGEMENT | Facility: CLINIC | Age: 44
End: 2024-11-01

## 2024-11-01 DIAGNOSIS — Z79.01 LONG TERM (CURRENT) USE OF ANTICOAGULANTS: ICD-10-CM

## 2024-11-01 DIAGNOSIS — I23.6 THROMBOSIS OF ATRIUM, AURICULAR APPENDAGE, AND VENTRICLE AS CURRENT COMPLICATIONS FOLLOWING ACUTE MYOCARDIAL INFARCTION: ICD-10-CM

## 2024-11-01 PROCEDURE — G0463: CPT

## 2024-11-01 RX ORDER — WARFARIN 2.5 MG/1
2.5 TABLET ORAL
Qty: 90 | Refills: 1 | Status: ACTIVE | COMMUNITY
Start: 2024-11-01 | End: 1900-01-01

## 2024-11-01 RX ORDER — WARFARIN 5 MG/1
5 TABLET ORAL AT BEDTIME
Qty: 90 | Refills: 1 | Status: ACTIVE | COMMUNITY
Start: 2024-11-01 | End: 1900-01-01

## 2024-11-07 ENCOUNTER — LABORATORY RESULT (OUTPATIENT)
Age: 44
End: 2024-11-07

## 2024-11-07 ENCOUNTER — OUTPATIENT (OUTPATIENT)
Dept: OUTPATIENT SERVICES | Facility: HOSPITAL | Age: 44
LOS: 1 days | End: 2024-11-07
Payer: COMMERCIAL

## 2024-11-07 ENCOUNTER — APPOINTMENT (OUTPATIENT)
Dept: MEDICATION MANAGEMENT | Facility: CLINIC | Age: 44
End: 2024-11-07

## 2024-11-07 DIAGNOSIS — I23.6 THROMBOSIS OF ATRIUM, AURICULAR APPENDAGE, AND VENTRICLE AS CURRENT COMPLICATIONS FOLLOWING ACUTE MYOCARDIAL INFARCTION: ICD-10-CM

## 2024-11-07 DIAGNOSIS — Z79.01 LONG TERM (CURRENT) USE OF ANTICOAGULANTS: ICD-10-CM

## 2024-11-07 LAB
INR PPP: 3.6 RATIO
POCT-PROTHROMBIN TIME: 42.7 SECS

## 2024-11-07 PROCEDURE — G0463: CPT

## 2024-11-14 ENCOUNTER — LABORATORY RESULT (OUTPATIENT)
Age: 44
End: 2024-11-14

## 2024-11-15 ENCOUNTER — NON-APPOINTMENT (OUTPATIENT)
Age: 44
End: 2024-11-15

## 2024-11-15 ENCOUNTER — APPOINTMENT (OUTPATIENT)
Dept: CARDIOLOGY | Facility: CLINIC | Age: 44
End: 2024-11-15
Payer: COMMERCIAL

## 2024-11-15 ENCOUNTER — OUTPATIENT (OUTPATIENT)
Dept: OUTPATIENT SERVICES | Facility: HOSPITAL | Age: 44
LOS: 1 days | End: 2024-11-15
Payer: COMMERCIAL

## 2024-11-15 ENCOUNTER — APPOINTMENT (OUTPATIENT)
Dept: MEDICATION MANAGEMENT | Facility: CLINIC | Age: 44
End: 2024-11-15

## 2024-11-15 VITALS
HEIGHT: 70 IN | BODY MASS INDEX: 26.2 KG/M2 | DIASTOLIC BLOOD PRESSURE: 88 MMHG | WEIGHT: 183 LBS | OXYGEN SATURATION: 97 % | SYSTOLIC BLOOD PRESSURE: 124 MMHG | HEART RATE: 111 BPM

## 2024-11-15 DIAGNOSIS — I23.6 THROMBOSIS OF ATRIUM, AURICULAR APPENDAGE, AND VENTRICLE AS CURRENT COMPLICATIONS FOLLOWING ACUTE MYOCARDIAL INFARCTION: ICD-10-CM

## 2024-11-15 DIAGNOSIS — Z79.01 LONG TERM (CURRENT) USE OF ANTICOAGULANTS: ICD-10-CM

## 2024-11-15 DIAGNOSIS — I50.22 CHRONIC SYSTOLIC (CONGESTIVE) HEART FAILURE: ICD-10-CM

## 2024-11-15 DIAGNOSIS — I25.10 ATHEROSCLEROTIC HEART DISEASE OF NATIVE CORONARY ARTERY W/OUT ANGINA PECTORIS: ICD-10-CM

## 2024-11-15 PROCEDURE — 93000 ELECTROCARDIOGRAM COMPLETE: CPT

## 2024-11-15 PROCEDURE — G0463: CPT

## 2024-11-15 PROCEDURE — 99215 OFFICE O/P EST HI 40 MIN: CPT

## 2024-11-15 PROCEDURE — G2211 COMPLEX E/M VISIT ADD ON: CPT

## 2024-11-15 RX ORDER — SPIRONOLACTONE 25 MG/1
25 TABLET ORAL
Qty: 90 | Refills: 3 | Status: ACTIVE | COMMUNITY
Start: 2024-11-15 | End: 1900-01-01

## 2024-11-20 ENCOUNTER — APPOINTMENT (OUTPATIENT)
Dept: ENDOCRINOLOGY | Facility: CLINIC | Age: 44
End: 2024-11-20

## 2024-11-21 ENCOUNTER — LABORATORY RESULT (OUTPATIENT)
Age: 44
End: 2024-11-21

## 2024-11-22 ENCOUNTER — APPOINTMENT (OUTPATIENT)
Dept: MEDICATION MANAGEMENT | Facility: CLINIC | Age: 44
End: 2024-11-22

## 2024-11-22 ENCOUNTER — OUTPATIENT (OUTPATIENT)
Dept: OUTPATIENT SERVICES | Facility: HOSPITAL | Age: 44
LOS: 1 days | End: 2024-11-22
Payer: COMMERCIAL

## 2024-11-22 DIAGNOSIS — Z79.01 LONG TERM (CURRENT) USE OF ANTICOAGULANTS: ICD-10-CM

## 2024-11-22 DIAGNOSIS — I23.6 THROMBOSIS OF ATRIUM, AURICULAR APPENDAGE, AND VENTRICLE AS CURRENT COMPLICATIONS FOLLOWING ACUTE MYOCARDIAL INFARCTION: ICD-10-CM

## 2024-11-22 PROCEDURE — G0463: CPT

## 2024-11-24 NOTE — ED ADULT TRIAGE NOTE - SOURCE OF INFORMATION
CC: Follow up, dysphagia     HPI: Constantin Sebastian is a 33 year old male who presents today for follow up. He has a history of hoarseness dating back to 2021 for which he initially saw Dr. Jason Juarez at [facility]. His symptoms back then included sharp throat pain and hoarseness. He has a long history of reflux and burning in the throat sensation. 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 He had a TNE in the office in March 2021 by Dr. Juarez that revealed normal esophagus. Last laryngoscopy in August 2023 by Dr. Juarez showed resolution of granulomas. He underwent speech therapy x1. He did have EGD in December 2023 by Dr. Abarca noting chronic gastritis; hyperplastic squamous mucosa with features favoring reflux. In January 2024, he woke up suddenly and began coughing up about a teaspoon of bright red blood about 3 weeks after Covid infection. Patient saw Dr. Corey in early March 2023 for these issues- Madhav noted some unspecified erythema along the posterior aspect of the right vocal fold. He saw Dr. Juarez in [place] after that and was noted to have small right posterior vocal fold granuloma. There is some right sided throat pain with coughing. Sometimes there is pinching sensation in the throat on the right.  I initially met patient in March 2024. Office bronchoscopy in March 2024 notable for right posterior vocal fold granuloma and normal trachea down to jeff. He preferred conservative measure back then so not intervention was performed.     Constantin states around late October 2024 he began experiencing left throat discomfort; intermittent coughing; fluctuating voice quality, painful swallow. His pulmonologist switched him from Pantoprazole to Esomeprazole 40mg BID and wrote to consider use of Famotidine 20mg BID. He is referred back to GI for reflux and back to sleep medicine for sleep study  and is awating these appointments. He states since onset of symptoms, the pain has subsided but he wants his throat evaluated. He also complains of left ear irritation and wonders if there is anything going on in the left ear. He denies a history of ear issues.     DI: (10 in March 2024)  VHI-10: (21 in March 2024)    PMH:   Past Medical History:   Diagnosis Date    Chronic Duodenitis 12/04/2023    Dr. Abarca 3 year recall    Chronic esophagitis 12/04/2023    Dr. Abarca 3 year recall    Mild chronic gastritis 12/04/2023    Dr. Abarca 3 year recall       PSH:   Past Surgical History:   Procedure Laterality Date    ESOPHAGOGASTRODUODENOSCOPY (EGD)  12/04/2023    Dr. Abarca Chronic Duodenitis, Mild Chronic Gastritis, Chronic Esophagitis 3 year recall       FAMH: Denies    SOCH:   Smoking: Denies  Denies  EtOH: Quit in 2021    Freelance work right now    MEDS:  Current Medications    ALFUZOSIN (UROXATRAL) 10 MG 24 HR TABLET    Take 1 tablet by mouth daily.    CLONAZEPAM (KLONOPIN) 0.5 MG TABLET    TAKE 1/2 TO 1 TABLET BY MOUTH EVERY DAY FOR ANXIETY.    METFORMIN (GLUCOPHAGE-XR) 500 MG 24 HR TABLET    Take 500 mg by mouth.    PANTOPRAZOLE (PROTONIX) 20 MG TABLET    Take 1 tablet by mouth daily.    POLYETHYLENE GLYCOL (MIRALAX) 17 G PACKET    Take 17 g by mouth daily. Stir and dissolve powder in any 4 to 8 ounces of beverage, then drink.    SUCRALFATE (CARAFATE) 1 G TABLET    Take 1 tablet by mouth 4 times daily as needed (reflux). Crush tablet, mix in a small amount of water and swallow up to 4 times a day as needed for GERD       ALL:   ALLERGIES:   Allergen Reactions    Bee SWELLING    Gadolinium Other (See Comments)     reported by pt       ROS:   A review of the following systems was performed: Constitutional, HENT, Eyes, Respiratory, Cardiac, Musculoskeletal, Lymphatic, Neurologic, Allergic, Endocrine. These are negative apart from the symptoms noted in the HPI.      PHYSICAL EXAM:  Visit Vitals  BP (!) 150/94    Pulse (!) 111   Ht 5' 9\" (1.753 m)   Wt 120.3 kg (265 lb 3.4 oz)   BMI 39.17 kg/m²     General: Well developed, pleasant male. Normal vocal quality.   Face: No sinus tenderness. Facial strength intact.   Skin: No obvious facial rashes or lesions. Nonenlarged salivary glands.   Eyes: Clear conjunctivae bilaterally. Pupils equal and round. Extraocular movements intact.   External ears: No obvious lesions. Hearing grossly intact to conversational voice.   Right ear: Patent external auditory canal. Intact tympanic membrane. No obvious middle ear masses or effusion   Left ear: Patent external auditory canal. Intact tympanic membrane. Cerumen and hair adherent to TM. No obvious middle ear masses or effusion  Nose: Nasal mucosa is clear with anterior inferior septal spurring on right; normal inferior turbinates bilaterally   Oral cavity: Grossly clear with symmetric lips, gingiva, and intact dentition  Oropharynx: 1+ tonsils, no obvious exudates or masses. Posterior pharyngeal wall clear. Midline uvula. Good mouth opening  Mirror exam: Unable to be tolerated due to gag  Neck: Soft, flat, supple without crepitus.   Respiratory: Respirations even, unlabored without stridor  Lymphatics: No obvious anterior cervical neck adenopathy   Neurologic: Cranial nerves grossly intact. AAO x3.     IMAGING:  CXR 1/27/2024:  IMPRESSION:  1. There is no evidence for pneumonia, pneumothorax, pulmonary edema or  pleural effusion.   2. There is no enlargement of the cardiac/pericardial silhouette.  3. No evidence for mass effect on the trachea.  I have personally reviewed the images and the report and agree with the above noted findings.     Flexible Laryngovideostroboscopy  Indication: Throat discomfort   Procedure: After informed consent was obtained, the patient's bilateral nares were topicalized with a 4% Lidocaine and Oxymetazoline aerosolized mixture. Approximately 1mL was used in total. Next, the flexible endoscope was passed through  the more patent naris and the endo laryngeal structures were visualized in detail. The following findings were noted and photodocumentation was performed. The patient tolerated the procedure well.  Findings: There is a normal appearing nasopharynx, base of tongue, vallecula, and posterior pharyngeal wall. The epiglottis is crisp. The bilateral arytenoid complexes exhibit normal excursion. The false vocal folds, aryepiglottic folds, ventricles, and pyriform sinuses are normal in appearance. No significant pooling of secretions. The true vocal folds have a normal epithelial appearance apart from a +small area of granulation tissue/erythematous irritation at the left vocal process. Good vocal fold pliability and good anterior glottic closure. There is grossly normal true vocal fold abduction and adduction bilaterally. Subglottis is grossly patent. Trachea down to jeff is patent.   Complication: None        Cerumen removal- left ear   Indication: Left ear cerumen,irritation   Procedure: The patient was seated in the examination chair. The microscope was utilized. The patient tolerated the procedure well.   Left ear: Cerumen admixed with a long hair was removed using sharp instrumentation: alligator forceps. The visualized tympanic membrane was intact.   The removal required minimal effort and took approximately <1 minute.   Complications: None      ASSESSMENT/PLAN:  It is my impression that Constantin Sebastian has the followin. Intermittent scant hemoptysis and fluctuating throat irritation. 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 Today there is benign appearing left vocal process granuloma visible which is mild in nature; previously seen right vocal process granuloma has resolved. He has had these lesions in the past which have self-resolved. We discussed mainstay of treatment is with treating the underlying cause. Reflux has been thought to play a  major role in his case and he has been referred back to GI for control of symptoms with medications. He is awaiting this appointment. He has tried cough suppression therapy with SLP in the past as well. Emphasized adequate hydration, humidification, use of non-menthol throat lozenges PRN. Reported other ,medication/procedural/surgical interventions for vocal process granulomas including steroid inhalers; serial steroid intralesional injections; Botox injections; surgical excision, etc. Discussed recurrence rate and how if the underlying cause continues he will recur. He prefers to see GI for medical management first and continue with conservative measures for now. We discussed monitoring annually.     2. Left ear irritation. Debrided cerumen and a long hair that was abutting the left TM. Patient is reassured of ear findings.     Constantin Sebastian expresses understanding and is in agreement with our plan. All of the patient's questions were answered to their satisfaction. He knows to call the office for any new/worsening symptoms.     Follow up in: PRN    I spent 45 minutes face-to-face with the patient. This time was spent pre-charting; documenting; discussion of the diagnosis, prognosis, coordination of care, and management/education with the patient.       Connie Ocampo MD  Otolaryngologist, Laryngologist   Department of Otolaryngology Head & Neck Surgery     Patient

## 2024-11-26 DIAGNOSIS — I23.6 THROMBOSIS OF ATRIUM, AURICULAR APPENDAGE, AND VENTRICLE AS CURRENT COMPLICATIONS FOLLOWING ACUTE MYOCARDIAL INFARCTION: ICD-10-CM

## 2024-11-29 ENCOUNTER — LABORATORY RESULT (OUTPATIENT)
Age: 44
End: 2024-11-29

## 2024-11-30 ENCOUNTER — APPOINTMENT (OUTPATIENT)
Dept: MEDICATION MANAGEMENT | Facility: CLINIC | Age: 44
End: 2024-11-30

## 2024-11-30 ENCOUNTER — OUTPATIENT (OUTPATIENT)
Dept: OUTPATIENT SERVICES | Facility: HOSPITAL | Age: 44
LOS: 1 days | End: 2024-11-30
Payer: COMMERCIAL

## 2024-11-30 DIAGNOSIS — Z79.01 LONG TERM (CURRENT) USE OF ANTICOAGULANTS: ICD-10-CM

## 2024-11-30 DIAGNOSIS — I23.6 THROMBOSIS OF ATRIUM, AURICULAR APPENDAGE, AND VENTRICLE AS CURRENT COMPLICATIONS FOLLOWING ACUTE MYOCARDIAL INFARCTION: ICD-10-CM

## 2024-11-30 PROCEDURE — G0463: CPT

## 2024-12-03 DIAGNOSIS — I23.6 THROMBOSIS OF ATRIUM, AURICULAR APPENDAGE, AND VENTRICLE AS CURRENT COMPLICATIONS FOLLOWING ACUTE MYOCARDIAL INFARCTION: ICD-10-CM

## 2024-12-05 ENCOUNTER — LABORATORY RESULT (OUTPATIENT)
Age: 44
End: 2024-12-05

## 2024-12-06 ENCOUNTER — APPOINTMENT (OUTPATIENT)
Dept: MEDICATION MANAGEMENT | Facility: CLINIC | Age: 44
End: 2024-12-06

## 2024-12-06 ENCOUNTER — OUTPATIENT (OUTPATIENT)
Dept: OUTPATIENT SERVICES | Facility: HOSPITAL | Age: 44
LOS: 1 days | End: 2024-12-06
Payer: COMMERCIAL

## 2024-12-06 DIAGNOSIS — I23.6 THROMBOSIS OF ATRIUM, AURICULAR APPENDAGE, AND VENTRICLE AS CURRENT COMPLICATIONS FOLLOWING ACUTE MYOCARDIAL INFARCTION: ICD-10-CM

## 2024-12-06 DIAGNOSIS — Z79.01 LONG TERM (CURRENT) USE OF ANTICOAGULANTS: ICD-10-CM

## 2024-12-06 PROCEDURE — G0463: CPT

## 2024-12-12 ENCOUNTER — LABORATORY RESULT (OUTPATIENT)
Age: 44
End: 2024-12-12

## 2024-12-13 ENCOUNTER — OUTPATIENT (OUTPATIENT)
Dept: OUTPATIENT SERVICES | Facility: HOSPITAL | Age: 44
LOS: 1 days | End: 2024-12-13
Payer: COMMERCIAL

## 2024-12-13 ENCOUNTER — APPOINTMENT (OUTPATIENT)
Dept: MEDICATION MANAGEMENT | Facility: CLINIC | Age: 44
End: 2024-12-13

## 2024-12-13 DIAGNOSIS — I23.6 THROMBOSIS OF ATRIUM, AURICULAR APPENDAGE, AND VENTRICLE AS CURRENT COMPLICATIONS FOLLOWING ACUTE MYOCARDIAL INFARCTION: ICD-10-CM

## 2024-12-13 DIAGNOSIS — Z79.01 LONG TERM (CURRENT) USE OF ANTICOAGULANTS: ICD-10-CM

## 2024-12-13 PROCEDURE — G0463: CPT

## 2024-12-19 ENCOUNTER — LABORATORY RESULT (OUTPATIENT)
Age: 44
End: 2024-12-19

## 2024-12-20 ENCOUNTER — APPOINTMENT (OUTPATIENT)
Dept: MEDICATION MANAGEMENT | Facility: CLINIC | Age: 44
End: 2024-12-20

## 2024-12-20 ENCOUNTER — OUTPATIENT (OUTPATIENT)
Dept: OUTPATIENT SERVICES | Facility: HOSPITAL | Age: 44
LOS: 1 days | End: 2024-12-20
Payer: COMMERCIAL

## 2024-12-20 DIAGNOSIS — I23.6 THROMBOSIS OF ATRIUM, AURICULAR APPENDAGE, AND VENTRICLE AS CURRENT COMPLICATIONS FOLLOWING ACUTE MYOCARDIAL INFARCTION: ICD-10-CM

## 2024-12-20 DIAGNOSIS — Z79.01 LONG TERM (CURRENT) USE OF ANTICOAGULANTS: ICD-10-CM

## 2024-12-20 PROCEDURE — G0463: CPT

## 2024-12-26 ENCOUNTER — LABORATORY RESULT (OUTPATIENT)
Age: 44
End: 2024-12-26

## 2024-12-27 ENCOUNTER — APPOINTMENT (OUTPATIENT)
Dept: MEDICATION MANAGEMENT | Facility: CLINIC | Age: 44
End: 2024-12-27

## 2024-12-27 ENCOUNTER — OUTPATIENT (OUTPATIENT)
Dept: OUTPATIENT SERVICES | Facility: HOSPITAL | Age: 44
LOS: 1 days | End: 2024-12-27
Payer: COMMERCIAL

## 2024-12-27 DIAGNOSIS — Z79.01 LONG TERM (CURRENT) USE OF ANTICOAGULANTS: ICD-10-CM

## 2024-12-27 DIAGNOSIS — I23.6 THROMBOSIS OF ATRIUM, AURICULAR APPENDAGE, AND VENTRICLE AS CURRENT COMPLICATIONS FOLLOWING ACUTE MYOCARDIAL INFARCTION: ICD-10-CM

## 2024-12-27 PROCEDURE — G0463: CPT

## 2025-01-02 ENCOUNTER — LABORATORY RESULT (OUTPATIENT)
Age: 45
End: 2025-01-02

## 2025-01-03 ENCOUNTER — OUTPATIENT (OUTPATIENT)
Dept: OUTPATIENT SERVICES | Facility: HOSPITAL | Age: 45
LOS: 1 days | End: 2025-01-03
Payer: COMMERCIAL

## 2025-01-03 ENCOUNTER — APPOINTMENT (OUTPATIENT)
Dept: MEDICATION MANAGEMENT | Facility: CLINIC | Age: 45
End: 2025-01-03

## 2025-01-03 DIAGNOSIS — I23.6 THROMBOSIS OF ATRIUM, AURICULAR APPENDAGE, AND VENTRICLE AS CURRENT COMPLICATIONS FOLLOWING ACUTE MYOCARDIAL INFARCTION: ICD-10-CM

## 2025-01-03 DIAGNOSIS — Z79.01 LONG TERM (CURRENT) USE OF ANTICOAGULANTS: ICD-10-CM

## 2025-01-03 PROCEDURE — 98012 SYNCH AUDIO-ONLY EST SF 10: CPT

## 2025-01-09 ENCOUNTER — LABORATORY RESULT (OUTPATIENT)
Age: 45
End: 2025-01-09

## 2025-01-10 ENCOUNTER — APPOINTMENT (OUTPATIENT)
Dept: MEDICATION MANAGEMENT | Facility: CLINIC | Age: 45
End: 2025-01-10

## 2025-01-10 ENCOUNTER — NON-APPOINTMENT (OUTPATIENT)
Age: 45
End: 2025-01-10

## 2025-01-10 ENCOUNTER — OUTPATIENT (OUTPATIENT)
Dept: OUTPATIENT SERVICES | Facility: HOSPITAL | Age: 45
LOS: 1 days | End: 2025-01-10
Payer: COMMERCIAL

## 2025-01-10 DIAGNOSIS — Z79.01 LONG TERM (CURRENT) USE OF ANTICOAGULANTS: ICD-10-CM

## 2025-01-10 DIAGNOSIS — I23.6 THROMBOSIS OF ATRIUM, AURICULAR APPENDAGE, AND VENTRICLE AS CURRENT COMPLICATIONS FOLLOWING ACUTE MYOCARDIAL INFARCTION: ICD-10-CM

## 2025-01-10 PROCEDURE — 98012 SYNCH AUDIO-ONLY EST SF 10: CPT

## 2025-01-10 RX ORDER — WARFARIN SODIUM 2.5 MG/1
2.5 TABLET ORAL DAILY
Qty: 90 | Refills: 0 | Status: ACTIVE | COMMUNITY
Start: 2025-01-10 | End: 1900-01-01

## 2025-01-10 RX ORDER — WARFARIN SODIUM 5 MG/1
5 TABLET ORAL DAILY
Qty: 90 | Refills: 0 | Status: ACTIVE | COMMUNITY
Start: 2025-01-10 | End: 1900-01-01

## 2025-01-14 RX ORDER — WARFARIN SODIUM 5 MG/1
5 TABLET ORAL
Qty: 90 | Refills: 0 | Status: ACTIVE | COMMUNITY
Start: 2025-01-14 | End: 1900-01-01

## 2025-01-16 ENCOUNTER — APPOINTMENT (OUTPATIENT)
Dept: MEDICATION MANAGEMENT | Facility: CLINIC | Age: 45
End: 2025-01-16

## 2025-01-16 ENCOUNTER — OUTPATIENT (OUTPATIENT)
Dept: OUTPATIENT SERVICES | Facility: HOSPITAL | Age: 45
LOS: 1 days | End: 2025-01-16
Payer: COMMERCIAL

## 2025-01-16 ENCOUNTER — LABORATORY RESULT (OUTPATIENT)
Age: 45
End: 2025-01-16

## 2025-01-16 DIAGNOSIS — I23.6 THROMBOSIS OF ATRIUM, AURICULAR APPENDAGE, AND VENTRICLE AS CURRENT COMPLICATIONS FOLLOWING ACUTE MYOCARDIAL INFARCTION: ICD-10-CM

## 2025-01-16 DIAGNOSIS — Z79.01 LONG TERM (CURRENT) USE OF ANTICOAGULANTS: ICD-10-CM

## 2025-01-16 PROCEDURE — 98012 SYNCH AUDIO-ONLY EST SF 10: CPT

## 2025-01-23 ENCOUNTER — LABORATORY RESULT (OUTPATIENT)
Age: 45
End: 2025-01-23

## 2025-01-23 ENCOUNTER — NON-APPOINTMENT (OUTPATIENT)
Age: 45
End: 2025-01-23

## 2025-01-24 ENCOUNTER — OUTPATIENT (OUTPATIENT)
Dept: OUTPATIENT SERVICES | Facility: HOSPITAL | Age: 45
LOS: 1 days | End: 2025-01-24
Payer: COMMERCIAL

## 2025-01-24 ENCOUNTER — APPOINTMENT (OUTPATIENT)
Dept: MEDICATION MANAGEMENT | Facility: CLINIC | Age: 45
End: 2025-01-24

## 2025-01-24 DIAGNOSIS — I23.6 THROMBOSIS OF ATRIUM, AURICULAR APPENDAGE, AND VENTRICLE AS CURRENT COMPLICATIONS FOLLOWING ACUTE MYOCARDIAL INFARCTION: ICD-10-CM

## 2025-01-24 DIAGNOSIS — Z79.01 LONG TERM (CURRENT) USE OF ANTICOAGULANTS: ICD-10-CM

## 2025-01-24 PROCEDURE — 98012 SYNCH AUDIO-ONLY EST SF 10: CPT

## 2025-01-25 DIAGNOSIS — Z79.01 LONG TERM (CURRENT) USE OF ANTICOAGULANTS: ICD-10-CM

## 2025-01-25 DIAGNOSIS — I23.6 THROMBOSIS OF ATRIUM, AURICULAR APPENDAGE, AND VENTRICLE AS CURRENT COMPLICATIONS FOLLOWING ACUTE MYOCARDIAL INFARCTION: ICD-10-CM

## 2025-01-30 ENCOUNTER — APPOINTMENT (OUTPATIENT)
Dept: MEDICATION MANAGEMENT | Facility: CLINIC | Age: 45
End: 2025-01-30

## 2025-01-30 ENCOUNTER — LABORATORY RESULT (OUTPATIENT)
Age: 45
End: 2025-01-30

## 2025-01-30 ENCOUNTER — OUTPATIENT (OUTPATIENT)
Dept: OUTPATIENT SERVICES | Facility: HOSPITAL | Age: 45
LOS: 1 days | End: 2025-01-30
Payer: COMMERCIAL

## 2025-01-30 DIAGNOSIS — Z79.01 LONG TERM (CURRENT) USE OF ANTICOAGULANTS: ICD-10-CM

## 2025-01-30 DIAGNOSIS — I23.6 THROMBOSIS OF ATRIUM, AURICULAR APPENDAGE, AND VENTRICLE AS CURRENT COMPLICATIONS FOLLOWING ACUTE MYOCARDIAL INFARCTION: ICD-10-CM

## 2025-01-30 PROCEDURE — 98012 SYNCH AUDIO-ONLY EST SF 10: CPT

## 2025-01-31 DIAGNOSIS — I23.6 THROMBOSIS OF ATRIUM, AURICULAR APPENDAGE, AND VENTRICLE AS CURRENT COMPLICATIONS FOLLOWING ACUTE MYOCARDIAL INFARCTION: ICD-10-CM

## 2025-02-06 ENCOUNTER — OUTPATIENT (OUTPATIENT)
Dept: OUTPATIENT SERVICES | Facility: HOSPITAL | Age: 45
LOS: 1 days | End: 2025-02-06
Payer: COMMERCIAL

## 2025-02-06 ENCOUNTER — APPOINTMENT (OUTPATIENT)
Dept: MEDICATION MANAGEMENT | Facility: CLINIC | Age: 45
End: 2025-02-06

## 2025-02-06 ENCOUNTER — LABORATORY RESULT (OUTPATIENT)
Age: 45
End: 2025-02-06

## 2025-02-06 DIAGNOSIS — I23.6 THROMBOSIS OF ATRIUM, AURICULAR APPENDAGE, AND VENTRICLE AS CURRENT COMPLICATIONS FOLLOWING ACUTE MYOCARDIAL INFARCTION: ICD-10-CM

## 2025-02-06 DIAGNOSIS — Z79.01 LONG TERM (CURRENT) USE OF ANTICOAGULANTS: ICD-10-CM

## 2025-02-06 PROCEDURE — 98012 SYNCH AUDIO-ONLY EST SF 10: CPT

## 2025-02-07 DIAGNOSIS — I23.6 THROMBOSIS OF ATRIUM, AURICULAR APPENDAGE, AND VENTRICLE AS CURRENT COMPLICATIONS FOLLOWING ACUTE MYOCARDIAL INFARCTION: ICD-10-CM

## 2025-02-07 DIAGNOSIS — Z79.01 LONG TERM (CURRENT) USE OF ANTICOAGULANTS: ICD-10-CM

## 2025-02-13 ENCOUNTER — LABORATORY RESULT (OUTPATIENT)
Age: 45
End: 2025-02-13

## 2025-02-14 ENCOUNTER — APPOINTMENT (OUTPATIENT)
Dept: MEDICATION MANAGEMENT | Facility: CLINIC | Age: 45
End: 2025-02-14

## 2025-02-14 ENCOUNTER — OUTPATIENT (OUTPATIENT)
Dept: OUTPATIENT SERVICES | Facility: HOSPITAL | Age: 45
LOS: 1 days | End: 2025-02-14
Payer: COMMERCIAL

## 2025-02-14 DIAGNOSIS — Z79.01 LONG TERM (CURRENT) USE OF ANTICOAGULANTS: ICD-10-CM

## 2025-02-14 DIAGNOSIS — I23.6 THROMBOSIS OF ATRIUM, AURICULAR APPENDAGE, AND VENTRICLE AS CURRENT COMPLICATIONS FOLLOWING ACUTE MYOCARDIAL INFARCTION: ICD-10-CM

## 2025-02-14 PROCEDURE — 98012 SYNCH AUDIO-ONLY EST SF 10: CPT

## 2025-02-15 DIAGNOSIS — Z79.01 LONG TERM (CURRENT) USE OF ANTICOAGULANTS: ICD-10-CM

## 2025-02-15 DIAGNOSIS — I23.6 THROMBOSIS OF ATRIUM, AURICULAR APPENDAGE, AND VENTRICLE AS CURRENT COMPLICATIONS FOLLOWING ACUTE MYOCARDIAL INFARCTION: ICD-10-CM

## 2025-02-20 ENCOUNTER — LABORATORY RESULT (OUTPATIENT)
Age: 45
End: 2025-02-20

## 2025-02-20 ENCOUNTER — APPOINTMENT (OUTPATIENT)
Dept: MEDICATION MANAGEMENT | Facility: CLINIC | Age: 45
End: 2025-02-20

## 2025-02-20 ENCOUNTER — OUTPATIENT (OUTPATIENT)
Dept: OUTPATIENT SERVICES | Facility: HOSPITAL | Age: 45
LOS: 1 days | End: 2025-02-20
Payer: COMMERCIAL

## 2025-02-20 DIAGNOSIS — Z79.01 LONG TERM (CURRENT) USE OF ANTICOAGULANTS: ICD-10-CM

## 2025-02-20 DIAGNOSIS — I23.6 THROMBOSIS OF ATRIUM, AURICULAR APPENDAGE, AND VENTRICLE AS CURRENT COMPLICATIONS FOLLOWING ACUTE MYOCARDIAL INFARCTION: ICD-10-CM

## 2025-02-20 PROCEDURE — 98012 SYNCH AUDIO-ONLY EST SF 10: CPT

## 2025-02-21 DIAGNOSIS — Z79.01 LONG TERM (CURRENT) USE OF ANTICOAGULANTS: ICD-10-CM

## 2025-02-21 DIAGNOSIS — I23.6 THROMBOSIS OF ATRIUM, AURICULAR APPENDAGE, AND VENTRICLE AS CURRENT COMPLICATIONS FOLLOWING ACUTE MYOCARDIAL INFARCTION: ICD-10-CM

## 2025-02-24 ENCOUNTER — APPOINTMENT (OUTPATIENT)
Dept: CARDIOLOGY | Facility: CLINIC | Age: 45
End: 2025-02-24

## 2025-02-27 ENCOUNTER — LABORATORY RESULT (OUTPATIENT)
Age: 45
End: 2025-02-27

## 2025-02-27 ENCOUNTER — APPOINTMENT (OUTPATIENT)
Dept: MEDICATION MANAGEMENT | Facility: CLINIC | Age: 45
End: 2025-02-27

## 2025-02-27 ENCOUNTER — OUTPATIENT (OUTPATIENT)
Dept: OUTPATIENT SERVICES | Facility: HOSPITAL | Age: 45
LOS: 1 days | End: 2025-02-27
Payer: COMMERCIAL

## 2025-02-27 DIAGNOSIS — I23.6 THROMBOSIS OF ATRIUM, AURICULAR APPENDAGE, AND VENTRICLE AS CURRENT COMPLICATIONS FOLLOWING ACUTE MYOCARDIAL INFARCTION: ICD-10-CM

## 2025-02-27 DIAGNOSIS — Z79.01 LONG TERM (CURRENT) USE OF ANTICOAGULANTS: ICD-10-CM

## 2025-02-27 PROCEDURE — 98012 SYNCH AUDIO-ONLY EST SF 10: CPT

## 2025-02-28 DIAGNOSIS — Z79.01 LONG TERM (CURRENT) USE OF ANTICOAGULANTS: ICD-10-CM

## 2025-02-28 DIAGNOSIS — I23.6 THROMBOSIS OF ATRIUM, AURICULAR APPENDAGE, AND VENTRICLE AS CURRENT COMPLICATIONS FOLLOWING ACUTE MYOCARDIAL INFARCTION: ICD-10-CM

## 2025-03-06 ENCOUNTER — EMERGENCY (EMERGENCY)
Facility: HOSPITAL | Age: 45
LOS: 1 days | Discharge: HOME | End: 2025-03-06
Attending: EMERGENCY MEDICINE | Admitting: EMERGENCY MEDICINE
Payer: COMMERCIAL

## 2025-03-06 VITALS
RESPIRATION RATE: 16 BRPM | DIASTOLIC BLOOD PRESSURE: 93 MMHG | TEMPERATURE: 98 F | WEIGHT: 179.9 LBS | HEART RATE: 119 BPM | SYSTOLIC BLOOD PRESSURE: 138 MMHG | OXYGEN SATURATION: 100 %

## 2025-03-06 PROCEDURE — 93010 ELECTROCARDIOGRAM REPORT: CPT

## 2025-03-06 PROCEDURE — 99285 EMERGENCY DEPT VISIT HI MDM: CPT

## 2025-03-06 RX ORDER — ACETAMINOPHEN 500 MG/5ML
1000 LIQUID (ML) ORAL ONCE
Refills: 0 | Status: COMPLETED | OUTPATIENT
Start: 2025-03-06 | End: 2025-03-06

## 2025-03-06 RX ORDER — LORAZEPAM 4 MG/ML
2 VIAL (ML) INJECTION ONCE
Refills: 0 | Status: DISCONTINUED | OUTPATIENT
Start: 2025-03-06 | End: 2025-03-06

## 2025-03-06 RX ORDER — LORAZEPAM 4 MG/ML
1 VIAL (ML) INJECTION ONCE
Refills: 0 | Status: DISCONTINUED | OUTPATIENT
Start: 2025-03-06 | End: 2025-03-06

## 2025-03-06 NOTE — ED ADULT TRIAGE NOTE - CHIEF COMPLAINT QUOTE
Pt c/o nausea x 6 hrs. Pt  has hx of etoh abuse, stents, DM2.. pt states he not compliant with his medications. No complaints of chest pain, headache  dizziness, vomiting  SOB, fever, chills verbalized.

## 2025-03-07 LAB
ALBUMIN SERPL ELPH-MCNC: 4.9 G/DL — SIGNIFICANT CHANGE UP (ref 3.3–5)
ALP SERPL-CCNC: 71 U/L — SIGNIFICANT CHANGE UP (ref 40–120)
ALT FLD-CCNC: 116 U/L — HIGH (ref 4–41)
ANION GAP SERPL CALC-SCNC: 21 MMOL/L — HIGH (ref 7–14)
APTT BLD: 30.4 SEC — SIGNIFICANT CHANGE UP (ref 24.5–35.6)
AST SERPL-CCNC: 139 U/L — HIGH (ref 4–40)
BASOPHILS # BLD AUTO: 0.04 K/UL — SIGNIFICANT CHANGE UP (ref 0–0.2)
BASOPHILS NFR BLD AUTO: 0.4 % — SIGNIFICANT CHANGE UP (ref 0–2)
BILIRUB SERPL-MCNC: 1.2 MG/DL — SIGNIFICANT CHANGE UP (ref 0.2–1.2)
BUN SERPL-MCNC: 9 MG/DL — SIGNIFICANT CHANGE UP (ref 7–23)
CALCIUM SERPL-MCNC: 10.1 MG/DL — SIGNIFICANT CHANGE UP (ref 8.4–10.5)
CHLORIDE SERPL-SCNC: 93 MMOL/L — LOW (ref 98–107)
CO2 SERPL-SCNC: 20 MMOL/L — LOW (ref 22–31)
CREAT SERPL-MCNC: 0.81 MG/DL — SIGNIFICANT CHANGE UP (ref 0.5–1.3)
EGFR: 112 ML/MIN/1.73M2 — SIGNIFICANT CHANGE UP
EOSINOPHIL # BLD AUTO: 0.04 K/UL — SIGNIFICANT CHANGE UP (ref 0–0.5)
EOSINOPHIL NFR BLD AUTO: 0.4 % — SIGNIFICANT CHANGE UP (ref 0–6)
ETHANOL SERPL-MCNC: 144 MG/DL — HIGH
GLUCOSE SERPL-MCNC: 111 MG/DL — HIGH (ref 70–99)
HCT VFR BLD CALC: 49 % — SIGNIFICANT CHANGE UP (ref 39–50)
HGB BLD-MCNC: 16.8 G/DL — SIGNIFICANT CHANGE UP (ref 13–17)
IANC: 6.67 K/UL — SIGNIFICANT CHANGE UP (ref 1.8–7.4)
IMM GRANULOCYTES NFR BLD AUTO: 0.4 % — SIGNIFICANT CHANGE UP (ref 0–0.9)
INR BLD: 0.97 RATIO — SIGNIFICANT CHANGE UP (ref 0.85–1.16)
LIDOCAIN IGE QN: 55 U/L — SIGNIFICANT CHANGE UP (ref 7–60)
LYMPHOCYTES # BLD AUTO: 1.8 K/UL — SIGNIFICANT CHANGE UP (ref 1–3.3)
LYMPHOCYTES # BLD AUTO: 19 % — SIGNIFICANT CHANGE UP (ref 13–44)
MAGNESIUM SERPL-MCNC: 2.1 MG/DL — SIGNIFICANT CHANGE UP (ref 1.6–2.6)
MCHC RBC-ENTMCNC: 32.4 PG — SIGNIFICANT CHANGE UP (ref 27–34)
MCHC RBC-ENTMCNC: 34.3 G/DL — SIGNIFICANT CHANGE UP (ref 32–36)
MCV RBC AUTO: 94.4 FL — SIGNIFICANT CHANGE UP (ref 80–100)
MONOCYTES # BLD AUTO: 0.87 K/UL — SIGNIFICANT CHANGE UP (ref 0–0.9)
MONOCYTES NFR BLD AUTO: 9.2 % — SIGNIFICANT CHANGE UP (ref 2–14)
NEUTROPHILS # BLD AUTO: 6.67 K/UL — SIGNIFICANT CHANGE UP (ref 1.8–7.4)
NEUTROPHILS NFR BLD AUTO: 70.6 % — SIGNIFICANT CHANGE UP (ref 43–77)
NRBC # BLD AUTO: 0 K/UL — SIGNIFICANT CHANGE UP (ref 0–0)
NRBC # FLD: 0 K/UL — SIGNIFICANT CHANGE UP (ref 0–0)
NRBC BLD AUTO-RTO: 0 /100 WBCS — SIGNIFICANT CHANGE UP (ref 0–0)
PLATELET # BLD AUTO: 136 K/UL — LOW (ref 150–400)
POTASSIUM SERPL-MCNC: 4.4 MMOL/L — SIGNIFICANT CHANGE UP (ref 3.5–5.3)
POTASSIUM SERPL-SCNC: 4.4 MMOL/L — SIGNIFICANT CHANGE UP (ref 3.5–5.3)
PROT SERPL-MCNC: 8.2 G/DL — SIGNIFICANT CHANGE UP (ref 6–8.3)
PROTHROM AB SERPL-ACNC: 11.5 SEC — SIGNIFICANT CHANGE UP (ref 9.9–13.4)
RBC # BLD: 5.19 M/UL — SIGNIFICANT CHANGE UP (ref 4.2–5.8)
RBC # FLD: 12.6 % — SIGNIFICANT CHANGE UP (ref 10.3–14.5)
SODIUM SERPL-SCNC: 134 MMOL/L — LOW (ref 135–145)
TROPONIN T, HIGH SENSITIVITY RESULT: 41 NG/L — SIGNIFICANT CHANGE UP
WBC # BLD: 9.46 K/UL — SIGNIFICANT CHANGE UP (ref 3.8–10.5)
WBC # FLD AUTO: 9.46 K/UL — SIGNIFICANT CHANGE UP (ref 3.8–10.5)

## 2025-03-07 RX ORDER — MAGNESIUM, ALUMINUM HYDROXIDE 200-200 MG
30 TABLET,CHEWABLE ORAL ONCE
Refills: 0 | Status: COMPLETED | OUTPATIENT
Start: 2025-03-07 | End: 2025-03-07

## 2025-03-07 RX ORDER — CHLORDIAZEPOXIDE HCL 10 MG
50 CAPSULE ORAL ONCE
Refills: 0 | Status: DISCONTINUED | OUTPATIENT
Start: 2025-03-07 | End: 2025-03-07

## 2025-03-07 RX ADMIN — Medication 2 MILLIGRAM(S): at 00:30

## 2025-03-07 RX ADMIN — Medication 50 MILLIGRAM(S): at 00:58

## 2025-03-07 RX ADMIN — Medication 30 MILLILITER(S): at 01:25

## 2025-03-07 RX ADMIN — Medication 20 MILLIGRAM(S): at 01:25

## 2025-03-07 RX ADMIN — Medication 400 MILLIGRAM(S): at 00:30

## 2025-03-07 RX ADMIN — Medication 1000 MILLILITER(S): at 00:30

## 2025-03-07 NOTE — ED PROVIDER NOTE - PHYSICAL EXAMINATION
GEN: NAD. A&Ox3. Non-toxic appearing.  HEENT: normocephalic, atraumatic, EOMI, PERRL, moist MM  CARDIAC: tachycardic, regular rhtyhm, S1, S2, no murmur. Radial pulses present and symmetric b/l  PULM: CTA B/L no wheeze, rhonchi, rales.   ABD: soft NT, ND, no rebound no guarding  MSK: Moving all extremities, no edema   NEURO: gait normal, mild hand tremors, no focal neurological deficits, CN 2-12 grossly intact  SKIN: multiple circular lesions on BUE and abdomen

## 2025-03-07 NOTE — ED PROVIDER NOTE - NSFOLLOWUPINSTRUCTIONS_ED_ALL_ED_FT
You were seen in the emergency department for pain.  Your results are attached.    You were provided detox resources.    Prescription for antifungals was also sent to your pharmacy.  Please take as prescribed.    Return to the emergency department for new or worsening symptoms.

## 2025-03-07 NOTE — ED PROVIDER NOTE - CLINICAL SUMMARY MEDICAL DECISION MAKING FREE TEXT BOX
44-year-old male history of coronary artery disease status post stents, diabetes, chronic alcohol use, presents with 1 day of nausea, diaphoresis, headaches, anxiety.  Patient has also noticed 1 week of lesions on his arms and abdomen that are pruritic.  States is a daily drinker and last drink this morning.  Denies fevers, chest pain, shortness of breath, abdominal pain, dysuria, hematuria, IV drug use.  Afebrile, tachycardic, lungs clear, abdomen soft nontender, multiple annular lesions on abdomen and upper extremities.  Alcohol withdrawal, will also evaluate for ACS given history.  Lesions possibly ringworm.  Labs, symptomatic control, reassess.  To be discharged with antifungals. 44-year-old male history of coronary artery disease status post stents, diabetes, chronic alcohol use, presents with 1 day of nausea, diaphoresis, headaches, anxiety.  Patient has also noticed 1 week of lesions on his arms and abdomen that are pruritic.  States is a daily drinker and last drink this morning.  Denies fevers, chest pain, shortness of breath, abdominal pain, dysuria, hematuria, IV drug use.  Afebrile, tachycardic, lungs clear, abdomen soft nontender, multiple annular lesions on abdomen and upper extremities. Likely alcohol withdrawal, will also evaluate for ACS given history.  Lesions possibly ringworm.  Labs, symptomatic control, reassess.  To be discharged with antifungals.

## 2025-03-07 NOTE — ED PROVIDER NOTE - ATTENDING CONTRIBUTION TO CARE
Agree with resident note  44-year-old male with history of CAD, diabetes, chronic alcohol use presents with 1 day of nausea, anxiety, headaches.  Patient also has a secondary complaint of multiple lesions on his arm and abdomen.  Patient drinks daily and last drink was this morning.  States when he does not drink feels tremulous.  No history of alcohol withdrawal admissions in the past.  Physical exam  Well-appearing, nontoxic  Tachycardic to 119 initially, not hypertensive, SpO2 100%  Eyes injected  Mildly tremulous  Clear to auscultation bilaterally  S1-S2 no murmurs rubs or gallops  Skin multiple discrete lesions on arms and abdomen likely consistent with ringworm  Impression  Will treat ringworm with antifungals, give patient long-acting benzos (p.o. and reassess

## 2025-03-07 NOTE — PROVIDER CONTACT NOTE (OTHER) - SITUATION
Pt pending discharge. SW met with pt at bedside to provide substance abuse resource list and SBIRT flyer.

## 2025-03-07 NOTE — ED PROVIDER NOTE - PROGRESS NOTE DETAILS
Patient with significant improvement in symptoms.  Resting comfortably, no complaints at this time.  Provided patient with outpatient detox resources. Sent a prescription for antifungals for concern for tinea corporis.   Paul Morton PGY-3

## 2025-03-07 NOTE — ED PROVIDER NOTE - PATIENT PORTAL LINK FT
You can access the FollowMyHealth Patient Portal offered by [facility] by registering at the following website: [email or web address]. By joining [employer] [facility]’s FollowMyHealth portal, you will also be able to view your health information using other applications (apps) compatible with our system.

## 2025-03-07 NOTE — ED ADULT NURSE NOTE - NSFALLUNIVINTERV_ED_ALL_ED
Bed/Stretcher in lowest position, wheels locked, appropriate side rails in place/Call bell, personal items and telephone in reach/Instruct patient to call for assistance before getting out of bed/chair/stretcher/Non-slip footwear applied when patient is off stretcher/[place] to call system/Physically safe environment - no spills, clutter or unnecessary equipment/Purposeful proactive rounding/Room/bathroom lighting operational, light cord in reach

## 2025-03-07 NOTE — ED ADULT NURSE NOTE - OBJECTIVE STATEMENT
Pt received to room 1A Pt is a 44 year old M with Hx of etoh abuse, MI/stents, DM2. Pt presented to ED c/o nausea, headache, sweats. patient states hes been having nausea, headache , cold sweats x a few hours. patient states he usually drinks a 6 pack daily and last drink was this morning he had 2 beers. denies chest pain, SOB, headache, dizziness, abdominal pain, v/d, urinary symptoms, fevers/chills, numbness/tingling. Pt is A&Ox4, ambulatory without assistance. neuro/sensory intact. airway patent, speaking clearly in full sentences.spontaneous movement of all extremities 20G LAC IV placed, +blood return, flushes without difficulty. labs collected and sent. medications administered as ordered. awaiting imaging. comfort measures provided. stretcher set in lowest position, call bell within reach, safety maintained.

## 2025-03-10 ENCOUNTER — APPOINTMENT (OUTPATIENT)
Dept: MEDICATION MANAGEMENT | Facility: CLINIC | Age: 45
End: 2025-03-10

## 2025-03-10 ENCOUNTER — OUTPATIENT (OUTPATIENT)
Dept: OUTPATIENT SERVICES | Facility: HOSPITAL | Age: 45
LOS: 1 days | End: 2025-03-10
Payer: COMMERCIAL

## 2025-03-10 ENCOUNTER — LABORATORY RESULT (OUTPATIENT)
Age: 45
End: 2025-03-10

## 2025-03-10 DIAGNOSIS — Z79.01 LONG TERM (CURRENT) USE OF ANTICOAGULANTS: ICD-10-CM

## 2025-03-10 PROCEDURE — 98012 SYNCH AUDIO-ONLY EST SF 10: CPT

## 2025-03-11 DIAGNOSIS — I23.6 THROMBOSIS OF ATRIUM, AURICULAR APPENDAGE, AND VENTRICLE AS CURRENT COMPLICATIONS FOLLOWING ACUTE MYOCARDIAL INFARCTION: ICD-10-CM

## 2025-03-12 DIAGNOSIS — Z79.01 LONG TERM (CURRENT) USE OF ANTICOAGULANTS: ICD-10-CM

## 2025-03-12 DIAGNOSIS — I23.6 THROMBOSIS OF ATRIUM, AURICULAR APPENDAGE, AND VENTRICLE AS CURRENT COMPLICATIONS FOLLOWING ACUTE MYOCARDIAL INFARCTION: ICD-10-CM

## 2025-03-13 ENCOUNTER — APPOINTMENT (OUTPATIENT)
Dept: CARDIOLOGY | Facility: CLINIC | Age: 45
End: 2025-03-13
Payer: COMMERCIAL

## 2025-03-13 ENCOUNTER — NON-APPOINTMENT (OUTPATIENT)
Age: 45
End: 2025-03-13

## 2025-03-13 ENCOUNTER — APPOINTMENT (OUTPATIENT)
Dept: MEDICATION MANAGEMENT | Facility: CLINIC | Age: 45
End: 2025-03-13

## 2025-03-13 ENCOUNTER — LABORATORY RESULT (OUTPATIENT)
Age: 45
End: 2025-03-13

## 2025-03-13 ENCOUNTER — OUTPATIENT (OUTPATIENT)
Dept: OUTPATIENT SERVICES | Facility: HOSPITAL | Age: 45
LOS: 1 days | End: 2025-03-13
Payer: COMMERCIAL

## 2025-03-13 VITALS — SYSTOLIC BLOOD PRESSURE: 131 MMHG | DIASTOLIC BLOOD PRESSURE: 84 MMHG

## 2025-03-13 VITALS
HEIGHT: 70 IN | SYSTOLIC BLOOD PRESSURE: 133 MMHG | HEART RATE: 118 BPM | TEMPERATURE: 97.4 F | DIASTOLIC BLOOD PRESSURE: 91 MMHG | BODY MASS INDEX: 25.2 KG/M2 | OXYGEN SATURATION: 95 % | WEIGHT: 176 LBS

## 2025-03-13 DIAGNOSIS — E78.5 HYPERLIPIDEMIA, UNSPECIFIED: ICD-10-CM

## 2025-03-13 DIAGNOSIS — I23.6 THROMBOSIS OF ATRIUM, AURICULAR APPENDAGE, AND VENTRICLE AS CURRENT COMPLICATIONS FOLLOWING ACUTE MYOCARDIAL INFARCTION: ICD-10-CM

## 2025-03-13 DIAGNOSIS — Z79.01 LONG TERM (CURRENT) USE OF ANTICOAGULANTS: ICD-10-CM

## 2025-03-13 DIAGNOSIS — I21.29 ST ELEVATION (STEMI) MYOCARDIAL INFARCTION INVOLVING OTHER SITES: ICD-10-CM

## 2025-03-13 DIAGNOSIS — I25.10 ATHEROSCLEROTIC HEART DISEASE OF NATIVE CORONARY ARTERY W/OUT ANGINA PECTORIS: ICD-10-CM

## 2025-03-13 DIAGNOSIS — I50.22 CHRONIC SYSTOLIC (CONGESTIVE) HEART FAILURE: ICD-10-CM

## 2025-03-13 PROCEDURE — 93000 ELECTROCARDIOGRAM COMPLETE: CPT

## 2025-03-13 PROCEDURE — G2211 COMPLEX E/M VISIT ADD ON: CPT

## 2025-03-13 PROCEDURE — 98012 SYNCH AUDIO-ONLY EST SF 10: CPT

## 2025-03-13 PROCEDURE — 99214 OFFICE O/P EST MOD 30 MIN: CPT

## 2025-03-14 DIAGNOSIS — I23.6 THROMBOSIS OF ATRIUM, AURICULAR APPENDAGE, AND VENTRICLE AS CURRENT COMPLICATIONS FOLLOWING ACUTE MYOCARDIAL INFARCTION: ICD-10-CM

## 2025-03-14 DIAGNOSIS — Z79.01 LONG TERM (CURRENT) USE OF ANTICOAGULANTS: ICD-10-CM

## 2025-03-22 ENCOUNTER — LABORATORY RESULT (OUTPATIENT)
Age: 45
End: 2025-03-22

## 2025-03-22 ENCOUNTER — APPOINTMENT (OUTPATIENT)
Dept: MEDICATION MANAGEMENT | Facility: CLINIC | Age: 45
End: 2025-03-22

## 2025-03-22 ENCOUNTER — OUTPATIENT (OUTPATIENT)
Dept: OUTPATIENT SERVICES | Facility: HOSPITAL | Age: 45
LOS: 1 days | End: 2025-03-22
Payer: COMMERCIAL

## 2025-03-22 DIAGNOSIS — Z79.01 LONG TERM (CURRENT) USE OF ANTICOAGULANTS: ICD-10-CM

## 2025-03-22 PROCEDURE — 98012 SYNCH AUDIO-ONLY EST SF 10: CPT

## 2025-03-24 DIAGNOSIS — I23.6 THROMBOSIS OF ATRIUM, AURICULAR APPENDAGE, AND VENTRICLE AS CURRENT COMPLICATIONS FOLLOWING ACUTE MYOCARDIAL INFARCTION: ICD-10-CM

## 2025-03-25 ENCOUNTER — LABORATORY RESULT (OUTPATIENT)
Age: 45
End: 2025-03-25

## 2025-03-25 ENCOUNTER — APPOINTMENT (OUTPATIENT)
Dept: MEDICATION MANAGEMENT | Facility: CLINIC | Age: 45
End: 2025-03-25

## 2025-03-25 ENCOUNTER — OUTPATIENT (OUTPATIENT)
Dept: OUTPATIENT SERVICES | Facility: HOSPITAL | Age: 45
LOS: 1 days | End: 2025-03-25
Payer: COMMERCIAL

## 2025-03-25 DIAGNOSIS — Z79.01 LONG TERM (CURRENT) USE OF ANTICOAGULANTS: ICD-10-CM

## 2025-03-25 DIAGNOSIS — I23.6 THROMBOSIS OF ATRIUM, AURICULAR APPENDAGE, AND VENTRICLE AS CURRENT COMPLICATIONS FOLLOWING ACUTE MYOCARDIAL INFARCTION: ICD-10-CM

## 2025-03-25 PROCEDURE — 98012 SYNCH AUDIO-ONLY EST SF 10: CPT

## 2025-03-26 DIAGNOSIS — I23.6 THROMBOSIS OF ATRIUM, AURICULAR APPENDAGE, AND VENTRICLE AS CURRENT COMPLICATIONS FOLLOWING ACUTE MYOCARDIAL INFARCTION: ICD-10-CM

## 2025-03-27 DIAGNOSIS — I23.6 THROMBOSIS OF ATRIUM, AURICULAR APPENDAGE, AND VENTRICLE AS CURRENT COMPLICATIONS FOLLOWING ACUTE MYOCARDIAL INFARCTION: ICD-10-CM

## 2025-03-27 DIAGNOSIS — Z79.01 LONG TERM (CURRENT) USE OF ANTICOAGULANTS: ICD-10-CM

## 2025-03-31 ENCOUNTER — APPOINTMENT (OUTPATIENT)
Dept: MEDICATION MANAGEMENT | Facility: CLINIC | Age: 45
End: 2025-03-31

## 2025-04-03 ENCOUNTER — LABORATORY RESULT (OUTPATIENT)
Age: 45
End: 2025-04-03

## 2025-04-03 ENCOUNTER — APPOINTMENT (OUTPATIENT)
Dept: MEDICATION MANAGEMENT | Facility: CLINIC | Age: 45
End: 2025-04-03

## 2025-04-03 ENCOUNTER — OUTPATIENT (OUTPATIENT)
Dept: OUTPATIENT SERVICES | Facility: HOSPITAL | Age: 45
LOS: 1 days | End: 2025-04-03
Payer: COMMERCIAL

## 2025-04-03 DIAGNOSIS — I23.6 THROMBOSIS OF ATRIUM, AURICULAR APPENDAGE, AND VENTRICLE AS CURRENT COMPLICATIONS FOLLOWING ACUTE MYOCARDIAL INFARCTION: ICD-10-CM

## 2025-04-03 DIAGNOSIS — Z79.01 LONG TERM (CURRENT) USE OF ANTICOAGULANTS: ICD-10-CM

## 2025-04-03 PROCEDURE — 98012 SYNCH AUDIO-ONLY EST SF 10: CPT

## 2025-04-04 ENCOUNTER — APPOINTMENT (OUTPATIENT)
Dept: ENDOCRINOLOGY | Facility: CLINIC | Age: 45
End: 2025-04-04

## 2025-04-04 VITALS — SYSTOLIC BLOOD PRESSURE: 131 MMHG | DIASTOLIC BLOOD PRESSURE: 92 MMHG

## 2025-04-04 VITALS
DIASTOLIC BLOOD PRESSURE: 97 MMHG | OXYGEN SATURATION: 98 % | TEMPERATURE: 98.9 F | HEART RATE: 99 BPM | SYSTOLIC BLOOD PRESSURE: 146 MMHG

## 2025-04-04 DIAGNOSIS — Z79.01 LONG TERM (CURRENT) USE OF ANTICOAGULANTS: ICD-10-CM

## 2025-04-04 DIAGNOSIS — E11.9 TYPE 2 DIABETES MELLITUS W/OUT COMPLICATIONS: ICD-10-CM

## 2025-04-04 DIAGNOSIS — I23.6 THROMBOSIS OF ATRIUM, AURICULAR APPENDAGE, AND VENTRICLE AS CURRENT COMPLICATIONS FOLLOWING ACUTE MYOCARDIAL INFARCTION: ICD-10-CM

## 2025-04-04 LAB
GLUCOSE BLDC GLUCOMTR-MCNC: 209
HBA1C MFR BLD HPLC: 7.8

## 2025-04-04 PROCEDURE — G2211 COMPLEX E/M VISIT ADD ON: CPT

## 2025-04-04 PROCEDURE — 99214 OFFICE O/P EST MOD 30 MIN: CPT

## 2025-04-05 LAB
ALBUMIN SERPL ELPH-MCNC: 5 G/DL
ALP BLD-CCNC: 56 U/L
ALT SERPL-CCNC: 58 U/L
ANION GAP SERPL CALC-SCNC: 14 MMOL/L
AST SERPL-CCNC: 38 U/L
BILIRUB SERPL-MCNC: 1.1 MG/DL
BUN SERPL-MCNC: 26 MG/DL
CALCIUM SERPL-MCNC: 10 MG/DL
CHLORIDE SERPL-SCNC: 100 MMOL/L
CHOLEST SERPL-MCNC: 205 MG/DL
CO2 SERPL-SCNC: 21 MMOL/L
CREAT SERPL-MCNC: 0.9 MG/DL
CREAT SPEC-SCNC: 91 MG/DL
EGFRCR SERPLBLD CKD-EPI 2021: 109 ML/MIN/1.73M2
GLUCOSE SERPL-MCNC: 196 MG/DL
HDLC SERPL-MCNC: 87 MG/DL
LDLC SERPL-MCNC: 100 MG/DL
MICROALBUMIN 24H UR DL<=1MG/L-MCNC: 4.4 MG/DL
MICROALBUMIN/CREAT 24H UR-RTO: 48 MG/G
NONHDLC SERPL-MCNC: 118 MG/DL
POTASSIUM SERPL-SCNC: 4.7 MMOL/L
PROT SERPL-MCNC: 8.3 G/DL
SODIUM SERPL-SCNC: 135 MMOL/L
TRIGL SERPL-MCNC: 103 MG/DL
TSH SERPL-ACNC: 1.29 UIU/ML

## 2025-04-10 ENCOUNTER — LABORATORY RESULT (OUTPATIENT)
Age: 45
End: 2025-04-10

## 2025-04-10 ENCOUNTER — APPOINTMENT (OUTPATIENT)
Dept: MEDICATION MANAGEMENT | Facility: CLINIC | Age: 45
End: 2025-04-10

## 2025-04-10 ENCOUNTER — OUTPATIENT (OUTPATIENT)
Dept: OUTPATIENT SERVICES | Facility: HOSPITAL | Age: 45
LOS: 1 days | End: 2025-04-10
Payer: COMMERCIAL

## 2025-04-10 DIAGNOSIS — Z79.01 LONG TERM (CURRENT) USE OF ANTICOAGULANTS: ICD-10-CM

## 2025-04-10 DIAGNOSIS — I23.6 THROMBOSIS OF ATRIUM, AURICULAR APPENDAGE, AND VENTRICLE AS CURRENT COMPLICATIONS FOLLOWING ACUTE MYOCARDIAL INFARCTION: ICD-10-CM

## 2025-04-10 PROCEDURE — 98012 SYNCH AUDIO-ONLY EST SF 10: CPT

## 2025-04-11 DIAGNOSIS — Z79.01 LONG TERM (CURRENT) USE OF ANTICOAGULANTS: ICD-10-CM

## 2025-04-11 DIAGNOSIS — I23.6 THROMBOSIS OF ATRIUM, AURICULAR APPENDAGE, AND VENTRICLE AS CURRENT COMPLICATIONS FOLLOWING ACUTE MYOCARDIAL INFARCTION: ICD-10-CM

## 2025-04-15 RX ORDER — ATORVASTATIN CALCIUM 80 MG/1
80 TABLET, FILM COATED ORAL
Qty: 90 | Refills: 1 | Status: ACTIVE | COMMUNITY
Start: 2025-04-15 | End: 1900-01-01

## 2025-04-17 ENCOUNTER — APPOINTMENT (OUTPATIENT)
Dept: MEDICATION MANAGEMENT | Facility: CLINIC | Age: 45
End: 2025-04-17

## 2025-04-17 ENCOUNTER — OUTPATIENT (OUTPATIENT)
Dept: OUTPATIENT SERVICES | Facility: HOSPITAL | Age: 45
LOS: 1 days | End: 2025-04-17
Payer: COMMERCIAL

## 2025-04-17 ENCOUNTER — LABORATORY RESULT (OUTPATIENT)
Age: 45
End: 2025-04-17

## 2025-04-17 DIAGNOSIS — I23.6 THROMBOSIS OF ATRIUM, AURICULAR APPENDAGE, AND VENTRICLE AS CURRENT COMPLICATIONS FOLLOWING ACUTE MYOCARDIAL INFARCTION: ICD-10-CM

## 2025-04-17 DIAGNOSIS — Z79.01 LONG TERM (CURRENT) USE OF ANTICOAGULANTS: ICD-10-CM

## 2025-04-17 PROCEDURE — 98012 SYNCH AUDIO-ONLY EST SF 10: CPT

## 2025-04-18 DIAGNOSIS — Z79.01 LONG TERM (CURRENT) USE OF ANTICOAGULANTS: ICD-10-CM

## 2025-04-18 DIAGNOSIS — I23.6 THROMBOSIS OF ATRIUM, AURICULAR APPENDAGE, AND VENTRICLE AS CURRENT COMPLICATIONS FOLLOWING ACUTE MYOCARDIAL INFARCTION: ICD-10-CM

## 2025-04-24 ENCOUNTER — OUTPATIENT (OUTPATIENT)
Dept: OUTPATIENT SERVICES | Facility: HOSPITAL | Age: 45
LOS: 1 days | End: 2025-04-24
Payer: COMMERCIAL

## 2025-04-24 ENCOUNTER — LABORATORY RESULT (OUTPATIENT)
Age: 45
End: 2025-04-24

## 2025-04-24 ENCOUNTER — APPOINTMENT (OUTPATIENT)
Dept: MEDICATION MANAGEMENT | Facility: CLINIC | Age: 45
End: 2025-04-24

## 2025-04-24 DIAGNOSIS — I23.6 THROMBOSIS OF ATRIUM, AURICULAR APPENDAGE, AND VENTRICLE AS CURRENT COMPLICATIONS FOLLOWING ACUTE MYOCARDIAL INFARCTION: ICD-10-CM

## 2025-04-24 DIAGNOSIS — Z79.01 LONG TERM (CURRENT) USE OF ANTICOAGULANTS: ICD-10-CM

## 2025-04-24 LAB
INR PPP: 2.8 RATIO
POCT-PROTHROMBIN TIME: 34 SECS

## 2025-04-24 PROCEDURE — 98012 SYNCH AUDIO-ONLY EST SF 10: CPT

## 2025-04-25 DIAGNOSIS — Z79.01 LONG TERM (CURRENT) USE OF ANTICOAGULANTS: ICD-10-CM

## 2025-04-25 DIAGNOSIS — I23.6 THROMBOSIS OF ATRIUM, AURICULAR APPENDAGE, AND VENTRICLE AS CURRENT COMPLICATIONS FOLLOWING ACUTE MYOCARDIAL INFARCTION: ICD-10-CM

## 2025-05-01 ENCOUNTER — APPOINTMENT (OUTPATIENT)
Dept: MEDICATION MANAGEMENT | Facility: CLINIC | Age: 45
End: 2025-05-01

## 2025-05-02 ENCOUNTER — APPOINTMENT (OUTPATIENT)
Dept: MEDICATION MANAGEMENT | Facility: CLINIC | Age: 45
End: 2025-05-02

## 2025-05-08 ENCOUNTER — APPOINTMENT (OUTPATIENT)
Dept: MEDICATION MANAGEMENT | Facility: CLINIC | Age: 45
End: 2025-05-08

## 2025-05-13 ENCOUNTER — OUTPATIENT (OUTPATIENT)
Dept: OUTPATIENT SERVICES | Facility: HOSPITAL | Age: 45
LOS: 1 days | End: 2025-05-13
Payer: COMMERCIAL

## 2025-05-13 ENCOUNTER — LABORATORY RESULT (OUTPATIENT)
Age: 45
End: 2025-05-13

## 2025-05-13 ENCOUNTER — APPOINTMENT (OUTPATIENT)
Dept: MEDICATION MANAGEMENT | Facility: CLINIC | Age: 45
End: 2025-05-13

## 2025-05-13 DIAGNOSIS — Z79.01 LONG TERM (CURRENT) USE OF ANTICOAGULANTS: ICD-10-CM

## 2025-05-13 PROCEDURE — 98012 SYNCH AUDIO-ONLY EST SF 10: CPT

## 2025-05-14 DIAGNOSIS — I23.6 THROMBOSIS OF ATRIUM, AURICULAR APPENDAGE, AND VENTRICLE AS CURRENT COMPLICATIONS FOLLOWING ACUTE MYOCARDIAL INFARCTION: ICD-10-CM

## 2025-05-15 DIAGNOSIS — Z79.01 LONG TERM (CURRENT) USE OF ANTICOAGULANTS: ICD-10-CM

## 2025-05-15 DIAGNOSIS — I23.6 THROMBOSIS OF ATRIUM, AURICULAR APPENDAGE, AND VENTRICLE AS CURRENT COMPLICATIONS FOLLOWING ACUTE MYOCARDIAL INFARCTION: ICD-10-CM

## 2025-05-22 ENCOUNTER — OUTPATIENT (OUTPATIENT)
Dept: OUTPATIENT SERVICES | Facility: HOSPITAL | Age: 45
LOS: 1 days | End: 2025-05-22
Payer: COMMERCIAL

## 2025-05-22 ENCOUNTER — APPOINTMENT (OUTPATIENT)
Dept: MEDICATION MANAGEMENT | Facility: CLINIC | Age: 45
End: 2025-05-22

## 2025-05-22 DIAGNOSIS — Z79.01 LONG TERM (CURRENT) USE OF ANTICOAGULANTS: ICD-10-CM

## 2025-05-22 DIAGNOSIS — I23.6 THROMBOSIS OF ATRIUM, AURICULAR APPENDAGE, AND VENTRICLE AS CURRENT COMPLICATIONS FOLLOWING ACUTE MYOCARDIAL INFARCTION: ICD-10-CM

## 2025-05-22 PROCEDURE — 98012 SYNCH AUDIO-ONLY EST SF 10: CPT

## 2025-05-23 DIAGNOSIS — I23.6 THROMBOSIS OF ATRIUM, AURICULAR APPENDAGE, AND VENTRICLE AS CURRENT COMPLICATIONS FOLLOWING ACUTE MYOCARDIAL INFARCTION: ICD-10-CM

## 2025-05-23 DIAGNOSIS — Z79.01 LONG TERM (CURRENT) USE OF ANTICOAGULANTS: ICD-10-CM

## 2025-05-28 ENCOUNTER — OUTPATIENT (OUTPATIENT)
Dept: OUTPATIENT SERVICES | Facility: HOSPITAL | Age: 45
LOS: 1 days | End: 2025-05-28
Payer: COMMERCIAL

## 2025-05-28 ENCOUNTER — APPOINTMENT (OUTPATIENT)
Dept: MEDICATION MANAGEMENT | Facility: CLINIC | Age: 45
End: 2025-05-28

## 2025-05-28 DIAGNOSIS — I23.6 THROMBOSIS OF ATRIUM, AURICULAR APPENDAGE, AND VENTRICLE AS CURRENT COMPLICATIONS FOLLOWING ACUTE MYOCARDIAL INFARCTION: ICD-10-CM

## 2025-05-28 DIAGNOSIS — Z79.01 LONG TERM (CURRENT) USE OF ANTICOAGULANTS: ICD-10-CM

## 2025-05-28 PROCEDURE — 98012 SYNCH AUDIO-ONLY EST SF 10: CPT

## 2025-05-29 DIAGNOSIS — Z79.01 LONG TERM (CURRENT) USE OF ANTICOAGULANTS: ICD-10-CM

## 2025-05-29 DIAGNOSIS — I23.6 THROMBOSIS OF ATRIUM, AURICULAR APPENDAGE, AND VENTRICLE AS CURRENT COMPLICATIONS FOLLOWING ACUTE MYOCARDIAL INFARCTION: ICD-10-CM

## 2025-06-03 ENCOUNTER — APPOINTMENT (OUTPATIENT)
Dept: MEDICATION MANAGEMENT | Facility: CLINIC | Age: 45
End: 2025-06-03

## 2025-06-03 ENCOUNTER — OUTPATIENT (OUTPATIENT)
Dept: OUTPATIENT SERVICES | Facility: HOSPITAL | Age: 45
LOS: 1 days | End: 2025-06-03
Payer: COMMERCIAL

## 2025-06-03 DIAGNOSIS — I23.6 THROMBOSIS OF ATRIUM, AURICULAR APPENDAGE, AND VENTRICLE AS CURRENT COMPLICATIONS FOLLOWING ACUTE MYOCARDIAL INFARCTION: ICD-10-CM

## 2025-06-03 DIAGNOSIS — Z79.01 LONG TERM (CURRENT) USE OF ANTICOAGULANTS: ICD-10-CM

## 2025-06-03 PROCEDURE — 98012 SYNCH AUDIO-ONLY EST SF 10: CPT

## 2025-06-04 DIAGNOSIS — Z79.01 LONG TERM (CURRENT) USE OF ANTICOAGULANTS: ICD-10-CM

## 2025-06-04 DIAGNOSIS — I23.6 THROMBOSIS OF ATRIUM, AURICULAR APPENDAGE, AND VENTRICLE AS CURRENT COMPLICATIONS FOLLOWING ACUTE MYOCARDIAL INFARCTION: ICD-10-CM

## 2025-06-10 ENCOUNTER — OUTPATIENT (OUTPATIENT)
Dept: OUTPATIENT SERVICES | Facility: HOSPITAL | Age: 45
LOS: 1 days | End: 2025-06-10
Payer: COMMERCIAL

## 2025-06-10 ENCOUNTER — APPOINTMENT (OUTPATIENT)
Dept: MEDICATION MANAGEMENT | Facility: CLINIC | Age: 45
End: 2025-06-10

## 2025-06-10 DIAGNOSIS — Z79.01 LONG TERM (CURRENT) USE OF ANTICOAGULANTS: ICD-10-CM

## 2025-06-10 LAB
INR PPP: 1
PT BLD: 11.4

## 2025-06-10 PROCEDURE — 98012 SYNCH AUDIO-ONLY EST SF 10: CPT

## 2025-06-11 DIAGNOSIS — B71.0: ICD-10-CM

## 2025-06-12 DIAGNOSIS — Z79.01 LONG TERM (CURRENT) USE OF ANTICOAGULANTS: ICD-10-CM

## 2025-06-19 ENCOUNTER — LABORATORY RESULT (OUTPATIENT)
Age: 45
End: 2025-06-19

## 2025-06-20 ENCOUNTER — OUTPATIENT (OUTPATIENT)
Dept: OUTPATIENT SERVICES | Facility: HOSPITAL | Age: 45
LOS: 1 days | End: 2025-06-20
Payer: COMMERCIAL

## 2025-06-20 ENCOUNTER — APPOINTMENT (OUTPATIENT)
Dept: MEDICATION MANAGEMENT | Facility: CLINIC | Age: 45
End: 2025-06-20

## 2025-06-20 DIAGNOSIS — I23.6 THROMBOSIS OF ATRIUM, AURICULAR APPENDAGE, AND VENTRICLE AS CURRENT COMPLICATIONS FOLLOWING ACUTE MYOCARDIAL INFARCTION: ICD-10-CM

## 2025-06-20 DIAGNOSIS — Z79.01 LONG TERM (CURRENT) USE OF ANTICOAGULANTS: ICD-10-CM

## 2025-06-20 PROCEDURE — 98012 SYNCH AUDIO-ONLY EST SF 10: CPT

## 2025-06-21 DIAGNOSIS — I23.6 THROMBOSIS OF ATRIUM, AURICULAR APPENDAGE, AND VENTRICLE AS CURRENT COMPLICATIONS FOLLOWING ACUTE MYOCARDIAL INFARCTION: ICD-10-CM

## 2025-06-21 DIAGNOSIS — Z79.01 LONG TERM (CURRENT) USE OF ANTICOAGULANTS: ICD-10-CM

## 2025-06-26 ENCOUNTER — LABORATORY RESULT (OUTPATIENT)
Age: 45
End: 2025-06-26

## 2025-06-26 ENCOUNTER — APPOINTMENT (OUTPATIENT)
Dept: MEDICATION MANAGEMENT | Facility: CLINIC | Age: 45
End: 2025-06-26

## 2025-06-26 ENCOUNTER — OUTPATIENT (OUTPATIENT)
Dept: OUTPATIENT SERVICES | Facility: HOSPITAL | Age: 45
LOS: 1 days | End: 2025-06-26
Payer: COMMERCIAL

## 2025-06-26 DIAGNOSIS — I23.6 THROMBOSIS OF ATRIUM, AURICULAR APPENDAGE, AND VENTRICLE AS CURRENT COMPLICATIONS FOLLOWING ACUTE MYOCARDIAL INFARCTION: ICD-10-CM

## 2025-06-26 DIAGNOSIS — Z79.01 LONG TERM (CURRENT) USE OF ANTICOAGULANTS: ICD-10-CM

## 2025-06-26 PROCEDURE — 98012 SYNCH AUDIO-ONLY EST SF 10: CPT

## 2025-07-03 ENCOUNTER — OUTPATIENT (OUTPATIENT)
Dept: OUTPATIENT SERVICES | Facility: HOSPITAL | Age: 45
LOS: 1 days | End: 2025-07-03
Payer: COMMERCIAL

## 2025-07-03 ENCOUNTER — LABORATORY RESULT (OUTPATIENT)
Age: 45
End: 2025-07-03

## 2025-07-03 ENCOUNTER — APPOINTMENT (OUTPATIENT)
Dept: MEDICATION MANAGEMENT | Facility: CLINIC | Age: 45
End: 2025-07-03

## 2025-07-03 DIAGNOSIS — I23.6 THROMBOSIS OF ATRIUM, AURICULAR APPENDAGE, AND VENTRICLE AS CURRENT COMPLICATIONS FOLLOWING ACUTE MYOCARDIAL INFARCTION: ICD-10-CM

## 2025-07-03 DIAGNOSIS — Z79.01 LONG TERM (CURRENT) USE OF ANTICOAGULANTS: ICD-10-CM

## 2025-07-03 PROCEDURE — 98012 SYNCH AUDIO-ONLY EST SF 10: CPT

## 2025-07-08 ENCOUNTER — APPOINTMENT (OUTPATIENT)
Dept: ENDOCRINOLOGY | Facility: CLINIC | Age: 45
End: 2025-07-08

## 2025-07-10 ENCOUNTER — APPOINTMENT (OUTPATIENT)
Dept: MEDICATION MANAGEMENT | Facility: CLINIC | Age: 45
End: 2025-07-10

## 2025-07-10 ENCOUNTER — OUTPATIENT (OUTPATIENT)
Dept: OUTPATIENT SERVICES | Facility: HOSPITAL | Age: 45
LOS: 1 days | End: 2025-07-10
Payer: COMMERCIAL

## 2025-07-10 ENCOUNTER — LABORATORY RESULT (OUTPATIENT)
Age: 45
End: 2025-07-10

## 2025-07-10 DIAGNOSIS — Z79.01 LONG TERM (CURRENT) USE OF ANTICOAGULANTS: ICD-10-CM

## 2025-07-10 DIAGNOSIS — I23.6 THROMBOSIS OF ATRIUM, AURICULAR APPENDAGE, AND VENTRICLE AS CURRENT COMPLICATIONS FOLLOWING ACUTE MYOCARDIAL INFARCTION: ICD-10-CM

## 2025-07-10 PROCEDURE — 98012 SYNCH AUDIO-ONLY EST SF 10: CPT

## 2025-07-17 ENCOUNTER — LABORATORY RESULT (OUTPATIENT)
Age: 45
End: 2025-07-17

## 2025-07-17 ENCOUNTER — APPOINTMENT (OUTPATIENT)
Dept: MEDICATION MANAGEMENT | Facility: CLINIC | Age: 45
End: 2025-07-17

## 2025-07-17 ENCOUNTER — OUTPATIENT (OUTPATIENT)
Dept: OUTPATIENT SERVICES | Facility: HOSPITAL | Age: 45
LOS: 1 days | End: 2025-07-17
Payer: COMMERCIAL

## 2025-07-17 DIAGNOSIS — I23.6 THROMBOSIS OF ATRIUM, AURICULAR APPENDAGE, AND VENTRICLE AS CURRENT COMPLICATIONS FOLLOWING ACUTE MYOCARDIAL INFARCTION: ICD-10-CM

## 2025-07-17 DIAGNOSIS — Z79.01 LONG TERM (CURRENT) USE OF ANTICOAGULANTS: ICD-10-CM

## 2025-07-17 PROCEDURE — 98012 SYNCH AUDIO-ONLY EST SF 10: CPT

## 2025-07-24 ENCOUNTER — LABORATORY RESULT (OUTPATIENT)
Age: 45
End: 2025-07-24

## 2025-07-24 ENCOUNTER — APPOINTMENT (OUTPATIENT)
Dept: MEDICATION MANAGEMENT | Facility: CLINIC | Age: 45
End: 2025-07-24

## 2025-07-24 ENCOUNTER — OUTPATIENT (OUTPATIENT)
Dept: OUTPATIENT SERVICES | Facility: HOSPITAL | Age: 45
LOS: 1 days | End: 2025-07-24
Payer: COMMERCIAL

## 2025-07-24 DIAGNOSIS — Z79.01 LONG TERM (CURRENT) USE OF ANTICOAGULANTS: ICD-10-CM

## 2025-07-24 DIAGNOSIS — I23.6 THROMBOSIS OF ATRIUM, AURICULAR APPENDAGE, AND VENTRICLE AS CURRENT COMPLICATIONS FOLLOWING ACUTE MYOCARDIAL INFARCTION: ICD-10-CM

## 2025-07-24 LAB
INR PPP: 3.6 RATIO
POCT-PROTHROMBIN TIME: 43.7 SECS

## 2025-07-24 PROCEDURE — 98012 SYNCH AUDIO-ONLY EST SF 10: CPT

## 2025-07-31 ENCOUNTER — OUTPATIENT (OUTPATIENT)
Dept: OUTPATIENT SERVICES | Facility: HOSPITAL | Age: 45
LOS: 1 days | End: 2025-07-31
Payer: COMMERCIAL

## 2025-07-31 ENCOUNTER — APPOINTMENT (OUTPATIENT)
Dept: MEDICATION MANAGEMENT | Facility: CLINIC | Age: 45
End: 2025-07-31

## 2025-07-31 ENCOUNTER — LABORATORY RESULT (OUTPATIENT)
Age: 45
End: 2025-07-31

## 2025-07-31 DIAGNOSIS — Z79.01 LONG TERM (CURRENT) USE OF ANTICOAGULANTS: ICD-10-CM

## 2025-07-31 DIAGNOSIS — I23.6 THROMBOSIS OF ATRIUM, AURICULAR APPENDAGE, AND VENTRICLE AS CURRENT COMPLICATIONS FOLLOWING ACUTE MYOCARDIAL INFARCTION: ICD-10-CM

## 2025-07-31 PROCEDURE — 98012 SYNCH AUDIO-ONLY EST SF 10: CPT

## 2025-08-07 ENCOUNTER — APPOINTMENT (OUTPATIENT)
Dept: MEDICATION MANAGEMENT | Facility: CLINIC | Age: 45
End: 2025-08-07

## 2025-08-07 ENCOUNTER — LABORATORY RESULT (OUTPATIENT)
Age: 45
End: 2025-08-07

## 2025-08-07 ENCOUNTER — OUTPATIENT (OUTPATIENT)
Dept: OUTPATIENT SERVICES | Facility: HOSPITAL | Age: 45
LOS: 1 days | End: 2025-08-07
Payer: COMMERCIAL

## 2025-08-07 DIAGNOSIS — Z79.01 LONG TERM (CURRENT) USE OF ANTICOAGULANTS: ICD-10-CM

## 2025-08-07 DIAGNOSIS — I23.6 THROMBOSIS OF ATRIUM, AURICULAR APPENDAGE, AND VENTRICLE AS CURRENT COMPLICATIONS FOLLOWING ACUTE MYOCARDIAL INFARCTION: ICD-10-CM

## 2025-08-07 PROCEDURE — 98012 SYNCH AUDIO-ONLY EST SF 10: CPT

## 2025-08-14 ENCOUNTER — APPOINTMENT (OUTPATIENT)
Dept: MEDICATION MANAGEMENT | Facility: CLINIC | Age: 45
End: 2025-08-14

## 2025-08-14 ENCOUNTER — LABORATORY RESULT (OUTPATIENT)
Age: 45
End: 2025-08-14

## 2025-08-14 ENCOUNTER — OUTPATIENT (OUTPATIENT)
Dept: OUTPATIENT SERVICES | Facility: HOSPITAL | Age: 45
LOS: 1 days | End: 2025-08-14
Payer: COMMERCIAL

## 2025-08-14 DIAGNOSIS — Z79.01 LONG TERM (CURRENT) USE OF ANTICOAGULANTS: ICD-10-CM

## 2025-08-14 DIAGNOSIS — I23.6 THROMBOSIS OF ATRIUM, AURICULAR APPENDAGE, AND VENTRICLE AS CURRENT COMPLICATIONS FOLLOWING ACUTE MYOCARDIAL INFARCTION: ICD-10-CM

## 2025-08-14 PROCEDURE — 98012 SYNCH AUDIO-ONLY EST SF 10: CPT

## 2025-08-15 DIAGNOSIS — I23.6 THROMBOSIS OF ATRIUM, AURICULAR APPENDAGE, AND VENTRICLE AS CURRENT COMPLICATIONS FOLLOWING ACUTE MYOCARDIAL INFARCTION: ICD-10-CM

## 2025-08-19 ENCOUNTER — APPOINTMENT (OUTPATIENT)
Dept: CARDIOLOGY | Facility: CLINIC | Age: 45
End: 2025-08-19
Payer: COMMERCIAL

## 2025-08-19 VITALS
DIASTOLIC BLOOD PRESSURE: 85 MMHG | HEIGHT: 70 IN | BODY MASS INDEX: 25.2 KG/M2 | WEIGHT: 176 LBS | SYSTOLIC BLOOD PRESSURE: 126 MMHG | OXYGEN SATURATION: 96 % | HEART RATE: 95 BPM

## 2025-08-19 DIAGNOSIS — I50.22 CHRONIC SYSTOLIC (CONGESTIVE) HEART FAILURE: ICD-10-CM

## 2025-08-19 DIAGNOSIS — I21.02 ST ELEVATION (STEMI) MYOCARDIAL INFARCTION INVOLVING LEFT ANTERIOR DESCENDING CORONARY ARTERY: ICD-10-CM

## 2025-08-19 DIAGNOSIS — I25.10 ATHEROSCLEROTIC HEART DISEASE OF NATIVE CORONARY ARTERY W/OUT ANGINA PECTORIS: ICD-10-CM

## 2025-08-19 DIAGNOSIS — I21.29 ST ELEVATION (STEMI) MYOCARDIAL INFARCTION INVOLVING OTHER SITES: ICD-10-CM

## 2025-08-19 PROCEDURE — 99215 OFFICE O/P EST HI 40 MIN: CPT

## 2025-08-19 PROCEDURE — G2211 COMPLEX E/M VISIT ADD ON: CPT

## 2025-08-19 PROCEDURE — 93000 ELECTROCARDIOGRAM COMPLETE: CPT

## 2025-08-21 ENCOUNTER — APPOINTMENT (OUTPATIENT)
Dept: MEDICATION MANAGEMENT | Facility: CLINIC | Age: 45
End: 2025-08-21

## 2025-08-21 ENCOUNTER — OUTPATIENT (OUTPATIENT)
Dept: OUTPATIENT SERVICES | Facility: HOSPITAL | Age: 45
LOS: 1 days | End: 2025-08-21
Payer: COMMERCIAL

## 2025-08-21 ENCOUNTER — LABORATORY RESULT (OUTPATIENT)
Age: 45
End: 2025-08-21

## 2025-08-21 DIAGNOSIS — Z79.01 LONG TERM (CURRENT) USE OF ANTICOAGULANTS: ICD-10-CM

## 2025-08-21 DIAGNOSIS — I23.6 THROMBOSIS OF ATRIUM, AURICULAR APPENDAGE, AND VENTRICLE AS CURRENT COMPLICATIONS FOLLOWING ACUTE MYOCARDIAL INFARCTION: ICD-10-CM

## 2025-08-21 PROCEDURE — 98013 SYNCH AUDIO-ONLY EST LOW 20: CPT

## 2025-08-22 DIAGNOSIS — I23.6 THROMBOSIS OF ATRIUM, AURICULAR APPENDAGE, AND VENTRICLE AS CURRENT COMPLICATIONS FOLLOWING ACUTE MYOCARDIAL INFARCTION: ICD-10-CM

## 2025-08-28 ENCOUNTER — OUTPATIENT (OUTPATIENT)
Dept: OUTPATIENT SERVICES | Facility: HOSPITAL | Age: 45
LOS: 1 days | End: 2025-08-28
Payer: COMMERCIAL

## 2025-08-28 ENCOUNTER — LABORATORY RESULT (OUTPATIENT)
Age: 45
End: 2025-08-28

## 2025-08-28 ENCOUNTER — APPOINTMENT (OUTPATIENT)
Dept: MEDICATION MANAGEMENT | Facility: CLINIC | Age: 45
End: 2025-08-28

## 2025-08-28 DIAGNOSIS — Z79.01 LONG TERM (CURRENT) USE OF ANTICOAGULANTS: ICD-10-CM

## 2025-08-28 DIAGNOSIS — I23.6 THROMBOSIS OF ATRIUM, AURICULAR APPENDAGE, AND VENTRICLE AS CURRENT COMPLICATIONS FOLLOWING ACUTE MYOCARDIAL INFARCTION: ICD-10-CM

## 2025-08-28 PROCEDURE — 98012 SYNCH AUDIO-ONLY EST SF 10: CPT

## 2025-08-29 DIAGNOSIS — I23.6 THROMBOSIS OF ATRIUM, AURICULAR APPENDAGE, AND VENTRICLE AS CURRENT COMPLICATIONS FOLLOWING ACUTE MYOCARDIAL INFARCTION: ICD-10-CM

## 2025-09-04 ENCOUNTER — APPOINTMENT (OUTPATIENT)
Dept: MEDICATION MANAGEMENT | Facility: CLINIC | Age: 45
End: 2025-09-04

## 2025-09-04 ENCOUNTER — LABORATORY RESULT (OUTPATIENT)
Age: 45
End: 2025-09-04

## 2025-09-04 ENCOUNTER — OUTPATIENT (OUTPATIENT)
Dept: OUTPATIENT SERVICES | Facility: HOSPITAL | Age: 45
LOS: 1 days | End: 2025-09-04
Payer: COMMERCIAL

## 2025-09-04 DIAGNOSIS — Z79.01 LONG TERM (CURRENT) USE OF ANTICOAGULANTS: ICD-10-CM

## 2025-09-04 DIAGNOSIS — I23.6 THROMBOSIS OF ATRIUM, AURICULAR APPENDAGE, AND VENTRICLE AS CURRENT COMPLICATIONS FOLLOWING ACUTE MYOCARDIAL INFARCTION: ICD-10-CM

## 2025-09-04 PROCEDURE — 98012 SYNCH AUDIO-ONLY EST SF 10: CPT

## 2025-09-05 DIAGNOSIS — I23.6 THROMBOSIS OF ATRIUM, AURICULAR APPENDAGE, AND VENTRICLE AS CURRENT COMPLICATIONS FOLLOWING ACUTE MYOCARDIAL INFARCTION: ICD-10-CM

## 2025-09-11 ENCOUNTER — LABORATORY RESULT (OUTPATIENT)
Age: 45
End: 2025-09-11

## 2025-09-11 ENCOUNTER — APPOINTMENT (OUTPATIENT)
Dept: MEDICATION MANAGEMENT | Facility: CLINIC | Age: 45
End: 2025-09-11

## 2025-09-18 ENCOUNTER — LABORATORY RESULT (OUTPATIENT)
Age: 45
End: 2025-09-18

## 2025-09-18 ENCOUNTER — APPOINTMENT (OUTPATIENT)
Dept: MEDICATION MANAGEMENT | Facility: CLINIC | Age: 45
End: 2025-09-18

## 2025-09-20 ENCOUNTER — APPOINTMENT (OUTPATIENT)
Dept: MEDICATION MANAGEMENT | Facility: CLINIC | Age: 45
End: 2025-09-20

## 2025-09-20 ENCOUNTER — LABORATORY RESULT (OUTPATIENT)
Age: 45
End: 2025-09-20